# Patient Record
Sex: FEMALE | Race: WHITE | NOT HISPANIC OR LATINO | Employment: FULL TIME | URBAN - METROPOLITAN AREA
[De-identification: names, ages, dates, MRNs, and addresses within clinical notes are randomized per-mention and may not be internally consistent; named-entity substitution may affect disease eponyms.]

---

## 2018-08-06 ENCOUNTER — OFFICE VISIT (OUTPATIENT)
Dept: URGENT CARE | Facility: CLINIC | Age: 53
End: 2018-08-06

## 2018-08-06 VITALS
RESPIRATION RATE: 16 BRPM | DIASTOLIC BLOOD PRESSURE: 78 MMHG | BODY MASS INDEX: 32.93 KG/M2 | OXYGEN SATURATION: 93 % | SYSTOLIC BLOOD PRESSURE: 140 MMHG | HEIGHT: 70 IN | HEART RATE: 91 BPM | WEIGHT: 230 LBS | TEMPERATURE: 98.9 F
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
## 2018-08-06 DIAGNOSIS — I10 ESSENTIAL HYPERTENSION: ICD-10-CM

## 2018-08-06 PROCEDURE — 99203 OFFICE O/P NEW LOW 30 MIN: CPT | Performed by: NURSE PRACTITIONER

## 2018-08-06 RX ORDER — AMOXICILLIN AND CLAVULANATE POTASSIUM 875; 125 MG/1; MG/1
1 TABLET, FILM COATED ORAL 2 TIMES DAILY
Qty: 20 TAB | Refills: 0 | Status: SHIPPED | OUTPATIENT
Start: 2018-08-06 | End: 2018-08-16

## 2018-08-06 RX ORDER — LISINOPRIL 20 MG/1
20 TABLET ORAL DAILY
Qty: 90 TAB | Refills: 1 | Status: SHIPPED | OUTPATIENT
Start: 2018-08-06 | End: 2021-02-24

## 2018-08-06 ASSESSMENT — ENCOUNTER SYMPTOMS
FEVER: 0
DIZZINESS: 0
DIARRHEA: 0
PALPITATIONS: 0
SORE THROAT: 0
MYALGIAS: 0
VOMITING: 0
HEADACHES: 0
SHORTNESS OF BREATH: 0
CHILLS: 0
NAUSEA: 0
COUGH: 0

## 2018-08-07 NOTE — PROGRESS NOTES
Subjective:      Laura Cano is a 52 y.o. female who presents with Dog Bite (right thumb x 24 hours)    Chief Complaint   Patient presents with   • Dog Bite     right thumb x 24 hours     She was breaking up a dog fight when she accidentally got bit  She is currently on her tenderness. She states her to get read this afternoon she did let the area bleed out and cleaned with iodine  She also needs a refill on her lisinopril as she has to find a new primary care doctor        HPI    Review of Systems   Constitutional: Negative for chills, fever and malaise/fatigue.   HENT: Negative for congestion and sore throat.    Respiratory: Negative for cough and shortness of breath.    Cardiovascular: Negative for chest pain and palpitations.   Gastrointestinal: Negative for diarrhea, nausea and vomiting.   Genitourinary: Negative for dysuria.   Musculoskeletal: Positive for joint pain. Negative for myalgias.   Skin: Negative for rash.   Neurological: Negative for dizziness and headaches.     Past Medical History:   Diagnosis Date   • Acne 2012   • Dysuria 2012   • Family history of colon cancer 2012   • HTN (hypertension) 2012   • Microcytic anemia 2012   • Thrombosed external hemorrhoid 10/24/2012   • Vitamin d deficiency 2012     .Family history reviewed and not related to this visit  Social History     Social History   • Marital status:      Spouse name: N/A   • Number of children: 3   • Years of education: N/A     Occupational History   •  [employer]     Social History Main Topics   • Smoking status: Never Smoker   • Smokeless tobacco: Never Used   • Alcohol use Yes      Comment: occ   • Drug use: No   • Sexual activity: Yes     Partners: Male     Other Topics Concern   • Not on file     Social History Narrative    Employed as .    In process of adopting 15 yo boy. Was in process of adopting his older sister but she  of complications of DM1 prior to  "completion.    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          Objective:     /78   Pulse 91   Temp 37.2 °C (98.9 °F)   Resp 16   Ht 1.778 m (5' 10\")   Wt 104.3 kg (230 lb)   SpO2 93%   BMI 33.00 kg/m²      Physical Exam   Constitutional: She is oriented to person, place, and time. She appears well-developed and well-nourished. No distress.   HENT:   Head: Normocephalic and atraumatic.   Eyes: Conjunctivae are normal.   Cardiovascular: Normal rate, regular rhythm and normal heart sounds.    Pulmonary/Chest: Effort normal and breath sounds normal.   Abdominal: Soft.   Musculoskeletal: Normal range of motion. She exhibits edema and tenderness.   Neurological: She is alert and oriented to person, place, and time.   Skin: Skin is warm and dry.   Psychiatric: She has a normal mood and affect. Her behavior is normal. Judgment and thought content normal.   Nursing note and vitals reviewed.              Assessment/Plan:     1. Dog bite, initial encounter  We did discuss if the finger search to get more red despite being on antibiotics to go to the emergency room- amoxicillin-clavulanate (AUGMENTIN) 875-125 MG Tab; Take 1 Tab by mouth 2 times a day for 10 days.  Dispense: 20 Tab; Refill: 0    2. Essential hypertension    - lisinopril (PRINIVIL) 20 MG Tab; Take 1 Tab by mouth every day.  Dispense: 90 Tab; Refill: 1  Please make an appointment for follow up with your primary care provider or make appointment to establish with a primary care. Return for any perception of change in condition, worsening of symptoms, such as perception of worse pain, worsening fever, not getting better, or any other concerns. Please go to the nearest emergency room for any shortness of breath or chest pain or for any of the emergent conditions we have discussed. Patient and/or family states understanding to plan of care, and discharge instructions. Different diagnosis were discussed and signs/symptoms were discussed to educate on.            "

## 2020-03-13 ENCOUNTER — OFFICE VISIT (OUTPATIENT)
Dept: URGENT CARE | Facility: CLINIC | Age: 55
End: 2020-03-13

## 2020-03-13 VITALS
DIASTOLIC BLOOD PRESSURE: 100 MMHG | HEART RATE: 78 BPM | TEMPERATURE: 98.9 F | SYSTOLIC BLOOD PRESSURE: 166 MMHG | HEIGHT: 70 IN | OXYGEN SATURATION: 98 % | BODY MASS INDEX: 30.06 KG/M2 | WEIGHT: 210 LBS | RESPIRATION RATE: 16 BRPM

## 2020-03-13 DIAGNOSIS — L03.114 CELLULITIS OF LEFT UPPER EXTREMITY: ICD-10-CM

## 2020-03-13 DIAGNOSIS — Z23 NEED FOR TDAP VACCINATION: ICD-10-CM

## 2020-03-13 DIAGNOSIS — I10 ESSENTIAL HYPERTENSION: ICD-10-CM
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
## 2020-03-13 PROCEDURE — 99214 OFFICE O/P EST MOD 30 MIN: CPT | Mod: 25 | Performed by: PHYSICIAN ASSISTANT

## 2020-03-13 PROCEDURE — 90715 TDAP VACCINE 7 YRS/> IM: CPT | Performed by: PHYSICIAN ASSISTANT

## 2020-03-13 PROCEDURE — 90471 IMMUNIZATION ADMIN: CPT | Performed by: PHYSICIAN ASSISTANT

## 2020-03-13 RX ORDER — AMOXICILLIN AND CLAVULANATE POTASSIUM 875; 125 MG/1; MG/1
1 TABLET, FILM COATED ORAL 2 TIMES DAILY
Qty: 10 TAB | Refills: 0 | Status: SHIPPED | OUTPATIENT
Start: 2020-03-13 | End: 2020-03-18

## 2020-03-13 RX ORDER — LISINOPRIL 20 MG/1
20 TABLET ORAL DAILY
Qty: 30 TAB | Refills: 0 | Status: SHIPPED | OUTPATIENT
Start: 2020-03-13 | End: 2021-02-24

## 2020-03-13 ASSESSMENT — ENCOUNTER SYMPTOMS
BLURRED VISION: 0
DIZZINESS: 0
HEADACHES: 0
CHILLS: 0
FEVER: 0

## 2020-03-14 NOTE — PROGRESS NOTES
"Subjective:   Laura Cano  is a 54 y.o. female who presents for Other (cat scraches lt arm happened this morning )    This is a new problem.  Patient presents to urgent care with concern for possible infection from cat scratch that occurred earlier today.  Patient was holding her cat when it was scared by the dog and planted its feet into her left wrist in an effort to sclerae away.  Patient sustained multiple abrasions and puncture wounds to the left wrist.  This has progressively been becoming more painful with redness and surrounding swelling over the past few hours.  She has had no documented fever.  Date of last known tetanus was 2007.    Patient also has history of hypertension.  She is not currently seeing a primary care provider.  She was seeing a provider in [place] who was prescribing her medication however they are no longer in practice.  Patient has 30 days of lisinopril remaining and is requesting a refill. Patient admits that her blood pressure is elevated here in clinic today because she has not taken her medication today.  Patient denies any headache, blurred vision, chest pain.      Other   Pertinent negatives include no chest pain, chills, fever or headaches.     Review of Systems   Constitutional: Negative for chills, fever and malaise/fatigue.   Eyes: Negative for blurred vision.   Cardiovascular: Negative for chest pain.   Skin:        Cat scratches with surrounding redness and pain left forearm   Neurological: Negative for dizziness and headaches.   All other systems reviewed and are negative.    Allergies   Allergen Reactions   • Sulfa Drugs      Reviewed past medical, surgical , social and family history.  Reviewed prescription and over-the-counter medications with patient and electronic health record today.     Objective:   BP (!) 166/100   Pulse 78   Temp 37.2 °C (98.9 °F) (Temporal)   Resp 16   Ht 1.778 m (5' 10\")   Wt 95.3 kg (210 lb)   SpO2 98%   BMI 30.13 kg/m²   Physical " Exam  Vitals signs reviewed.   Constitutional:       General: She is not in acute distress.     Appearance: She is well-developed. She is not ill-appearing or toxic-appearing.   HENT:      Head: Normocephalic and atraumatic.      Right Ear: Tympanic membrane, ear canal and external ear normal.      Left Ear: Tympanic membrane, ear canal and external ear normal.      Nose: Nose normal.      Mouth/Throat:      Lips: Pink. No lesions.      Mouth: Mucous membranes are moist.      Pharynx: Oropharynx is clear. Uvula midline. No oropharyngeal exudate.   Eyes:      General: Lids are normal.      Extraocular Movements: Extraocular movements intact.      Conjunctiva/sclera: Conjunctivae normal.      Pupils: Pupils are equal, round, and reactive to light.      Funduscopic exam:     Right eye: No papilledema.         Left eye: No papilledema.   Neck:      Musculoskeletal: Normal range of motion and neck supple.      Vascular: No carotid bruit.   Cardiovascular:      Rate and Rhythm: Normal rate and regular rhythm.      Heart sounds: Normal heart sounds. No murmur. No friction rub. No gallop.    Pulmonary:      Effort: Pulmonary effort is normal. No respiratory distress.      Breath sounds: Normal breath sounds.   Abdominal:      General: Bowel sounds are normal. There is no distension.      Palpations: Abdomen is soft. There is no mass.      Tenderness: There is no abdominal tenderness. There is no guarding or rebound.   Musculoskeletal: Normal range of motion.         General: No tenderness or deformity.        Arms:       Comments: Left wrist with multiple small puncture wounds with surrounding erythema, warmth and moderate soft tissue swelling.  Patient exhibits no pain with flexion extension of the thumb flexion or extension of the wrist.  Distal neurovascular intact.   Lymphadenopathy:      Head:      Right side of head: No submental, submandibular or tonsillar adenopathy.      Left side of head: No submental,  submandibular or tonsillar adenopathy.      Cervical: No cervical adenopathy.      Upper Body:      Right upper body: No supraclavicular adenopathy.      Left upper body: No supraclavicular adenopathy.   Skin:     General: Skin is warm and dry.      Findings: No rash.   Neurological:      Mental Status: She is alert and oriented to person, place, and time.      Cranial Nerves: Cranial nerves are intact. No cranial nerve deficit.      Sensory: Sensation is intact. No sensory deficit.      Motor: Motor function is intact.      Coordination: Coordination is intact. Coordination normal.      Gait: Gait is intact.   Psychiatric:         Attention and Perception: Attention normal.         Mood and Affect: Mood and affect normal.         Speech: Speech normal.         Behavior: Behavior normal. Behavior is cooperative.         Thought Content: Thought content normal.         Judgment: Judgment normal.           Assessment/Plan:   1. Cat scratch  - Tdap =>6yo IM    2. Cellulitis of left upper extremity  - amoxicillin-clavulanate (AUGMENTIN) 875-125 MG Tab; Take 1 Tab by mouth 2 times a day for 5 days.  Dispense: 10 Tab; Refill: 0    3. Essential hypertension  - lisinopril (PRINIVIL) 20 MG Tab; Take 1 Tab by mouth every day.  Dispense: 30 Tab; Refill: 0  - REFERRAL TO FOLLOW-UP WITH PRIMARY CARE    4. Need for Tdap vaccination  - Tdap =>6yo IM    Tetanus is updated today.  Patient will be placed on Augmentin as above.  Recommend close observation.  Discussed with patient signs to observe for in terms of potential tendon infection and is counseled on presenting to the emergency department for concern of tendon infection or severe worsening.  Patient verbalizes understanding and wishes to proceed.    Refill provided on lisinopril for 30-day supply.  Discussed with patient that she will need to establish with primary care.  She is inquiring if she can return to urgent care every 30 days for medication refills.  I discussed with  the patient the importance of establishing with primary care as we do not provide longitudinal long-term care in the urgent care setting.  Referral is placed.    Differential diagnosis, natural history, supportive care, and indications for immediate follow-up discussed.     Red flag warning symptoms and strict ER/follow-up precautions given.  The patient demonstrated a good understanding and agreed with the treatment plan.  Please note that this note was created using voice recognition speech to text software. Every effort has been made to correct obvious errors.  However, I expect there are errors of grammar and possibly context that were not discovered prior to finalizing the note  MYRNA Nelson PA-C

## 2021-01-14 ENCOUNTER — TELEPHONE (OUTPATIENT)
Dept: SCHEDULING | Facility: IMAGING CENTER | Age: 56
End: 2021-01-14

## 2021-02-24 ENCOUNTER — OFFICE VISIT (OUTPATIENT)
Dept: MEDICAL GROUP | Facility: MEDICAL CENTER | Age: 56
End: 2021-02-24
Payer: COMMERCIAL

## 2021-02-24 VITALS
HEIGHT: 70 IN | TEMPERATURE: 98.2 F | WEIGHT: 228 LBS | SYSTOLIC BLOOD PRESSURE: 164 MMHG | BODY MASS INDEX: 32.64 KG/M2 | RESPIRATION RATE: 12 BRPM | HEART RATE: 84 BPM | OXYGEN SATURATION: 94 % | DIASTOLIC BLOOD PRESSURE: 90 MMHG

## 2021-02-24 DIAGNOSIS — Z12.12 SCREENING FOR COLORECTAL CANCER: ICD-10-CM

## 2021-02-24 DIAGNOSIS — Z80.0 FAMILY HISTORY OF COLON CANCER: ICD-10-CM

## 2021-02-24 DIAGNOSIS — Z12.11 SCREENING FOR COLORECTAL CANCER: ICD-10-CM

## 2021-02-24 DIAGNOSIS — Z82.62 FAMILY HISTORY OF OSTEOPOROSIS: ICD-10-CM

## 2021-02-24 DIAGNOSIS — I10 ESSENTIAL HYPERTENSION: ICD-10-CM

## 2021-02-24 DIAGNOSIS — D50.9 MICROCYTIC ANEMIA: ICD-10-CM

## 2021-02-24 DIAGNOSIS — E66.9 OBESITY (BMI 30-39.9): ICD-10-CM

## 2021-02-24 DIAGNOSIS — Z83.3 FAMILY HISTORY OF DIABETES MELLITUS (DM): ICD-10-CM

## 2021-02-24 DIAGNOSIS — Z12.31 ENCOUNTER FOR SCREENING MAMMOGRAM FOR BREAST CANCER: ICD-10-CM

## 2021-02-24 PROCEDURE — 99204 OFFICE O/P NEW MOD 45 MIN: CPT | Performed by: FAMILY MEDICINE

## 2021-02-24 RX ORDER — LISINOPRIL 10 MG/1
10 TABLET ORAL DAILY
Qty: 90 TABLET | Refills: 0 | Status: SHIPPED | OUTPATIENT
Start: 2021-02-24 | End: 2021-03-02 | Stop reason: SDUPTHER

## 2021-02-24 ASSESSMENT — PATIENT HEALTH QUESTIONNAIRE - PHQ9: CLINICAL INTERPRETATION OF PHQ2 SCORE: 0

## 2021-02-24 NOTE — ASSESSMENT & PLAN NOTE
Chronic issue.  She was previously seeing Dr. Lan who started her on lisinopril but she has not taken this in more than 9 months.,  No chest pain, palpations, SOB, hands or feet swelling, headaches or dizziness.  She is not checking her home blood pressure.  No negative SEs from lisinopril in the past.

## 2021-02-24 NOTE — ASSESSMENT & PLAN NOTE
Pt has hx of anemia which she states caused her TIA symptoms and this improved with an iron infusion.  No syncope.  States she feels tired but is taking care of her 7 yo grandson and her LMP was 10/2020 and she is now getting hot flashes which wake her up at night.

## 2021-02-24 NOTE — PROGRESS NOTES
Subjective:     CC:  Diagnoses of Essential hypertension, Microcytic anemia, Family history of colon cancer, Encounter for screening mammogram for breast cancer, Screening for colorectal cancer, Family history of osteoporosis, Family history of diabetes mellitus (DM), and Obesity (BMI 30-39.9) were pertinent to this visit.    HISTORY OF THE PRESENT ILLNESS: Patient is a 55 y.o. female. This pleasant patient is here today to establish care and discuss DEXA referral, labs and BP follow-up. Her prior PCP was Dr. Shanell Lan.    HTN (hypertension)  Chronic issue.  She was previously seeing Dr. Lan who started her on lisinopril but she has not taken this in more than 9 months.,  No chest pain, palpations, SOB, hands or feet swelling, headaches or dizziness.  She is not checking her home blood pressure.  No negative SEs from lisinopril in the past.    Microcytic anemia  Pt has hx of anemia which she states caused her TIA symptoms and this improved with an iron infusion.  No syncope.  States she feels tired but is taking care of her 7 yo grandson and her LMP was 10/2020 and she is now getting hot flashes which wake her up at night.    Family history of colon cancer  Father was dx with colon cancer.  Pt is due for repeat colonoscopy. Denies changes in stools and denies rectal bleeding.      Allergies: Sulfa drugs    Current Outpatient Medications Ordered in Epic   Medication Sig Dispense Refill   • lisinopril (PRINIVIL) 10 MG Tab Take 1 tablet by mouth every day. 90 tablet 0   • aspirin  MG TBEC Take 1 Tab by mouth every day. 30 Tab 0     No current Epic-ordered facility-administered medications on file.       Past Medical History:   Diagnosis Date   • Acne 4/16/2012   • Dysuria 4/16/2012   • Family history of colon cancer 4/16/2012   • HTN (hypertension) 4/16/2012   • Microcytic anemia 8/22/2012   • Thrombosed external hemorrhoid 10/24/2012   • Vitamin d deficiency 4/16/2012       Past Surgical History:   Procedure  "Laterality Date   • TONSILLECTOMY     • TUBAL LIGATION         Social History     Tobacco Use   • Smoking status: Never Smoker   • Smokeless tobacco: Never Used   Substance Use Topics   • Alcohol use: Yes     Alcohol/week: 1.2 oz     Types: 2 Glasses of wine per week     Comment: occ   • Drug use: No       Social History     Social History Narrative    Employed as .    In process of adopting 15 yo boy. Was in process of adopting his older sister but she  of complications of DM1 prior to completion.    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       Family History   Problem Relation Age of Onset   • Lung Disease Father         emphysema   • Heart Disease Father         d. MI age 67   • Hypertension Father    • Cancer Father         colon polyps   • Lung Disease Mother         emphysema   • Osteoporosis Mother    • Cancer Mother         benign brain tumor   • Diabetes Brother        Health Maintenance: mammogram and colonoscopy ordered.  Pt declines flu shot.  Encouraged shingles vaccine which she plans to research and get back to me regarding timing she is willing to get it    ROS:   Gen: no fevers/chills, no changes in weight  Eyes: no changes in vision  ENT: no sore throat, no hearing loss, no bloody nose  Pulm: no sob, no cough  CV: no chest pain, no palpitations  GI: no nausea/vomiting, no diarrhea  : no dysuria  MSk: no myalgias  Skin: no rash  Neuro: no headaches, no numbness/tingling  Heme/Lymph: no easy bruising      Objective:     Exam: BP (!) 164/90 (BP Location: Left arm, Patient Position: Sitting, BP Cuff Size: Adult)   Pulse 84   Temp 36.8 °C (98.2 °F) (Temporal)   Resp 12   Ht 1.778 m (5' 10\")   Wt 103 kg (228 lb)   SpO2 94%  Body mass index is 32.71 kg/m².    General: Normal appearing. No distress.  HEENT: Normocephalic.  Canals are clear bilaterally, tympanic membranes are benign, nasal and OP examinations deferred given COVID19 exposure risk  Eyes: Eyes conjunctiva clear lids without " ptosis, pupils equal and reactive to light accommodation, ears normal shape and contour  Neck: Supple. Thyroid is not enlarged.  Pulmonary: Clear to ausculation.  Normal effort. No rales, ronchi, or wheezing.  Cardiovascular: Regular rate and rhythm without murmur.  Abdomen: Soft, nontender, nondistended. Normal bowel sounds. Liver and spleen are not palpable  Neurologic: No gross motor deficits  Lymph: No cervical or supraclavicular lymph nodes are palpable  Skin: Warm and dry.  No obvious lesions.  Musculoskeletal: Normal gait. No extremity cyanosis, clubbing, or edema.  Psych: Normal mood and affect. Alert and oriented x3. Judgment and insight is normal. PHQ 9 =3.  No SI/Hi    Assessment & Plan:   55 y.o. female with the following -    1. Essential hypertension  Chronic, uncontrolled, asymptomatic.  Restart lisinopril with 5mg daily for 3 days followed by 10mg daily.  Encouraged low salt diet, avoid caffeine.  Er precautions given.  Will closely follow-up  - Comp Metabolic Panel; Future  - Lipid Profile; Future  - lisinopril (PRINIVIL) 10 MG Tab; Take 1 tablet by mouth every day.  Dispense: 90 tablet; Refill: 0    2. Microcytic anemia  Chronic issue, unclear control now s/p iron infusion. Will check labs and further evaluate based on results.  - CBC WITH DIFFERENTIAL; Future    3. Family history of colon cancer  Pt is asymptomatic but has family hx of colon cancer and is due for repeat colonoscopy.  Referral placed    4. Encounter for screening mammogram for breast cancer  Pt reports no breast lumps, masses, skin changes, nipple discharge.  Screening mammogram ordered  - MA-SCREENING MAMMO BILAT W/CAD; Future    5. Screening for colorectal cancer  Pt is asymptomatic but has family hx of colon cancer and is due for repeat colonoscopy.  Referral placed  - REFERRAL TO GI FOR COLONOSCOPY    6. Family history of osteoporosis  Pt's mother has osteoporosis and the pt drinks alcohol regularly and would like screening.   Imaging ordered.  Lifestyle and dietary recommendations given.  - DS-BONE DENSITY STUDY (DEXA); Future    7. Family history of diabetes mellitus (DM)  Pt is overweight and brother has DM.  Will do screening labs. Dietary and exercise guidance given.  - HEMOGLOBIN A1C; Future    8. Obesity (BMI 30-39.9)  - Patient identified as having weight management issue.  Appropriate orders and counseling given.      Return in about 1 week (around 3/3/2021) for HTN follow-up.    Please note that this dictation was created using voice recognition software. I have made every reasonable attempt to correct obvious errors, but I expect that there are errors of grammar and possibly content that I did not discover before finalizing the note.

## 2021-02-24 NOTE — ASSESSMENT & PLAN NOTE
Father was dx with colon cancer.  Pt is due for repeat colonoscopy. Denies changes in stools and denies rectal bleeding.

## 2021-03-01 NOTE — PROGRESS NOTES
Subjective:     CC: HTN follow-up    HPI:   Laura presents today with     HTN (hypertension)  Chronic, issue.  No chest pain, palpitations or SOB.  She has taken the full dose of lisinopril 10mg for the last 4 days.  She has noticed a twice of her right cheek since starting her lisinopril but no other negative Ses.    Other headache syndrome  Onset a few months ago, temporarily improved and worsened again yesterday.  Feels like she has a water sensation behind right eye and deep in her head which worsens when she does a sudden turn with her head.  No vision or hearing changes.  The discomfort tends to last from 1 day to 3 days and self resolves.  No known triggers.  She has tried claritin without relief.  She recently had a full set of dental xrays which were normal.  No fevers, chills, weakness or numbness.      Past Medical History:   Diagnosis Date   • Acne 4/16/2012   • Dysuria 4/16/2012   • Family history of colon cancer 4/16/2012   • HTN (hypertension) 4/16/2012   • Microcytic anemia 8/22/2012   • Thrombosed external hemorrhoid 10/24/2012   • Vitamin d deficiency 4/16/2012       Social History     Tobacco Use   • Smoking status: Never Smoker   • Smokeless tobacco: Never Used   Substance Use Topics   • Alcohol use: Yes     Alcohol/week: 1.2 oz     Types: 2 Glasses of wine per week     Comment: occ   • Drug use: No       Current Outpatient Medications Ordered in Epic   Medication Sig Dispense Refill   • fluticasone (FLONASE) 50 MCG/ACT nasal spray Administer 1 Spray into affected nostril(S) every day. 16 g 0   • lisinopril (PRINIVIL) 20 MG Tab Take 1 tablet by mouth every day. To replace lisinopril 10mg daily. 90 tablet 0   • Dextromethorphan-guaiFENesin (CORICIDIN HBP CONGESTION/COUGH)  MG Cap Take 1 tablet by mouth every day. 30 capsule 0   • aspirin  MG TBEC Take 1 Tab by mouth every day. 30 Tab 0     No current Epic-ordered facility-administered medications on file.       Allergies:  Sulfa  "drugs    Health Maintenance: pt to get labs as previously ordered on 3/11/2021    ROS:  Gen: no fevers/chills, no changes in weight  Eyes: no changes in vision  ENT: no sore throat, no hearing loss, no bloody nose  Pulm: no sob, no cough  CV: no chest pain, no palpitations  GI: no nausea/vomiting, no diarrhea  : no dysuria  MSk: no myalgias  Skin: no rash  Neuro: no headaches, no numbness/tingling  Heme/Lymph: no easy bruising      Objective:       Exam:  /80 (BP Location: Right arm, Patient Position: Sitting, BP Cuff Size: Adult)   Pulse 83   Temp 36.8 °C (98.2 °F) (Temporal)   Resp 12   Ht 1.778 m (5' 10\")   Wt 103 kg (228 lb)   SpO2 94%   BMI 32.71 kg/m²  Body mass index is 32.71 kg/m².    Gen: Alert and oriented, No apparent distress.  HEENT: Right TM with fluid noted behind TM but no erythema.  Left TM is normal.  Neck: Neck is supple without lymphadenopathy.  Lungs: Normal effort, CTA bilaterally, no wheezes, rhonchi, or rales  CV: Regular rate and rhythm. No murmurs, rubs, or gallops.  Ext: No clubbing, cyanosis, edema.  Neuro: strength 5/5 in all muscle groups, sensation intact bilaterally to light touch, coordination intact bilaterally, Romberg negative, pronator drift negative        Assessment & Plan:     55 y.o. female with the following -     1. Essential hypertension  Chronic, improved but not at goal of less than 140/90.  Will get non-fasting BMP today given her facial twitching may be due to electrolyte abnormality vs stress and increase lisinopril 10mg to 20mg daily.  Will montior.  - lisinopril (PRINIVIL) 20 MG Tab; Take 1 tablet by mouth every day. To replace lisinopril 10mg daily.  Dispense: 90 tablet; Refill: 0  - Basic Metabolic Panel; Future    2. Fluid level behind tympanic membrane of right ear  New issue which is the likely cause of her headache discomfort.  No red flag headache signs.  Start coricidin and flonase.  SE profile discussed.  Follow-up precautions given and will " monitor. Patient expressed understanding and agreement with plan.  - fluticasone (FLONASE) 50 MCG/ACT nasal spray; Administer 1 Spray into affected nostril(S) every day.  Dispense: 16 g; Refill: 0  - Dextromethorphan-guaiFENesin (CORICIDIN HBP CONGESTION/COUGH)  MG Cap; Take 1 tablet by mouth every day.  Dispense: 30 capsule; Refill: 0      Return in about 4 weeks (around 3/30/2021) for HTN and ear recheck.    Please note that this dictation was created using voice recognition software. I have made every reasonable attempt to correct obvious errors, but I expect that there are errors of grammar and possibly content that I did not discover before finalizing the note.

## 2021-03-02 ENCOUNTER — OFFICE VISIT (OUTPATIENT)
Dept: MEDICAL GROUP | Facility: MEDICAL CENTER | Age: 56
End: 2021-03-02
Payer: COMMERCIAL

## 2021-03-02 ENCOUNTER — HOSPITAL ENCOUNTER (OUTPATIENT)
Dept: LAB | Facility: MEDICAL CENTER | Age: 56
End: 2021-03-02
Attending: FAMILY MEDICINE
Payer: COMMERCIAL

## 2021-03-02 VITALS
WEIGHT: 228 LBS | OXYGEN SATURATION: 94 % | HEART RATE: 83 BPM | HEIGHT: 70 IN | SYSTOLIC BLOOD PRESSURE: 142 MMHG | DIASTOLIC BLOOD PRESSURE: 80 MMHG | RESPIRATION RATE: 12 BRPM | TEMPERATURE: 98.2 F | BODY MASS INDEX: 32.64 KG/M2

## 2021-03-02 DIAGNOSIS — H65.91 FLUID LEVEL BEHIND TYMPANIC MEMBRANE OF RIGHT EAR: ICD-10-CM

## 2021-03-02 DIAGNOSIS — I10 ESSENTIAL HYPERTENSION: ICD-10-CM

## 2021-03-02 DIAGNOSIS — G44.89 OTHER HEADACHE SYNDROME: ICD-10-CM

## 2021-03-02 LAB
ANION GAP SERPL CALC-SCNC: 11 MMOL/L (ref 7–16)
BUN SERPL-MCNC: 22 MG/DL (ref 8–22)
CALCIUM SERPL-MCNC: 9.5 MG/DL (ref 8.4–10.2)
CHLORIDE SERPL-SCNC: 102 MMOL/L (ref 96–112)
CO2 SERPL-SCNC: 26 MMOL/L (ref 20–33)
CREAT SERPL-MCNC: 0.67 MG/DL (ref 0.5–1.4)
GLUCOSE SERPL-MCNC: 92 MG/DL (ref 65–99)
POTASSIUM SERPL-SCNC: 4.1 MMOL/L (ref 3.6–5.5)
SODIUM SERPL-SCNC: 139 MMOL/L (ref 135–145)

## 2021-03-02 PROCEDURE — 36415 COLL VENOUS BLD VENIPUNCTURE: CPT

## 2021-03-02 PROCEDURE — 99213 OFFICE O/P EST LOW 20 MIN: CPT | Performed by: FAMILY MEDICINE

## 2021-03-02 PROCEDURE — 80048 BASIC METABOLIC PNL TOTAL CA: CPT

## 2021-03-02 RX ORDER — FLUTICASONE PROPIONATE 50 MCG
1 SPRAY, SUSPENSION (ML) NASAL DAILY
Qty: 16 G | Refills: 0 | Status: SHIPPED | OUTPATIENT
Start: 2021-03-02 | End: 2021-05-12

## 2021-03-02 RX ORDER — LORATADINE 10 MG
1 CAPSULE ORAL DAILY
Qty: 30 CAPSULE | Refills: 0 | Status: SHIPPED | OUTPATIENT
Start: 2021-03-02 | End: 2021-05-12

## 2021-03-02 RX ORDER — LISINOPRIL 20 MG/1
20 TABLET ORAL DAILY
Qty: 90 TABLET | Refills: 0 | Status: SHIPPED | OUTPATIENT
Start: 2021-03-02 | End: 2021-04-24 | Stop reason: SDUPTHER

## 2021-03-02 NOTE — ASSESSMENT & PLAN NOTE
Onset a few months ago, temporarily improved and worsened again yesterday.  Feels like she has a water sensation behind right eye and deep in her head which worsens when she does a sudden turn with her head.  No vision or hearing changes.  The discomfort tends to last from 1 day to 3 days and self resolves.  No known triggers.  She has tried claritin without relief.  She recently had a full set of dental xrays which were normal.  No fevers, chills, weakness or numbness.

## 2021-03-02 NOTE — ASSESSMENT & PLAN NOTE
Chronic, issue.  No chest pain, palpitations or SOB.  She has taken the full dose of lisinopril 10mg for the last 4 days.  She has noticed a twice of her right cheek since starting her lisinopril but no other negative Ses.

## 2021-04-15 ENCOUNTER — HOSPITAL ENCOUNTER (OUTPATIENT)
Dept: RADIOLOGY | Facility: MEDICAL CENTER | Age: 56
End: 2021-04-15
Payer: COMMERCIAL

## 2021-04-15 DIAGNOSIS — I10 ESSENTIAL HYPERTENSION: ICD-10-CM

## 2021-04-15 RX ORDER — LISINOPRIL 10 MG/1
TABLET ORAL
Qty: 90 TABLET | Refills: 0 | OUTPATIENT
Start: 2021-04-15

## 2021-04-15 NOTE — TELEPHONE ENCOUNTER
Received request via: Pharmacy    Was the patient seen in the last year in this department? Yes    Does the patient have an active prescription (recently filled or refills available) for medication(s) requested? No     Future Appointments       Provider Department Center    4/21/2021 11:30 AM Daly Haji D.O. [facility]    4/22/2021 3:30 PM S BARBARA MG 1 [facility]    4/22/2021 3:45 PM S [facility] BD [facility]

## 2021-04-22 ENCOUNTER — HOSPITAL ENCOUNTER (OUTPATIENT)
Dept: RADIOLOGY | Facility: MEDICAL CENTER | Age: 56
End: 2021-04-22
Attending: FAMILY MEDICINE
Payer: COMMERCIAL

## 2021-04-22 DIAGNOSIS — Z82.62 FAMILY HISTORY OF OSTEOPOROSIS: ICD-10-CM

## 2021-04-22 DIAGNOSIS — Z12.31 ENCOUNTER FOR SCREENING MAMMOGRAM FOR BREAST CANCER: ICD-10-CM

## 2021-04-22 PROCEDURE — 77080 DXA BONE DENSITY AXIAL: CPT

## 2021-04-22 PROCEDURE — 77063 BREAST TOMOSYNTHESIS BI: CPT

## 2021-04-23 ENCOUNTER — TELEPHONE (OUTPATIENT)
Dept: MEDICAL GROUP | Facility: MEDICAL CENTER | Age: 56
End: 2021-04-23

## 2021-04-23 NOTE — TELEPHONE ENCOUNTER
, Pt called with concerns regarding her recent mammogram done today. She was told by the facility that she would need to return on Monday for another appt. Pt is worried and would like some reassurance regarding the results from today.Please advise.Send mychart if possible due to being Friday afternoon.

## 2021-04-24 DIAGNOSIS — I10 ESSENTIAL HYPERTENSION: ICD-10-CM

## 2021-04-26 ENCOUNTER — HOSPITAL ENCOUNTER (OUTPATIENT)
Dept: RADIOLOGY | Facility: MEDICAL CENTER | Age: 56
End: 2021-04-26
Attending: FAMILY MEDICINE
Payer: COMMERCIAL

## 2021-04-26 DIAGNOSIS — R92.8 ABNORMAL MAMMOGRAM: ICD-10-CM

## 2021-04-26 DIAGNOSIS — R92.8 ABNORMAL MAMMOGRAM OF RIGHT BREAST: ICD-10-CM

## 2021-04-26 PROCEDURE — G0279 TOMOSYNTHESIS, MAMMO: HCPCS | Mod: RT

## 2021-04-26 PROCEDURE — 76642 ULTRASOUND BREAST LIMITED: CPT | Mod: RT

## 2021-04-26 RX ORDER — LISINOPRIL 20 MG/1
20 TABLET ORAL DAILY
Qty: 90 TABLET | Refills: 0 | Status: SHIPPED | OUTPATIENT
Start: 2021-04-26 | End: 2021-06-07 | Stop reason: SDUPTHER

## 2021-05-03 ENCOUNTER — HOSPITAL ENCOUNTER (OUTPATIENT)
Dept: RADIOLOGY | Facility: MEDICAL CENTER | Age: 56
End: 2021-05-03
Attending: FAMILY MEDICINE
Payer: COMMERCIAL

## 2021-05-03 DIAGNOSIS — R92.8 ABNORMAL MAMMOGRAM OF RIGHT BREAST: ICD-10-CM

## 2021-05-03 LAB — PATHOLOGY CONSULT NOTE: NORMAL

## 2021-05-03 PROCEDURE — 88341 IMHCHEM/IMCYTCHM EA ADD ANTB: CPT | Mod: 91

## 2021-05-03 PROCEDURE — 88342 IMHCHEM/IMCYTCHM 1ST ANTB: CPT

## 2021-05-03 PROCEDURE — 88305 TISSUE EXAM BY PATHOLOGIST: CPT

## 2021-05-03 PROCEDURE — 77065 DX MAMMO INCL CAD UNI: CPT | Mod: RT

## 2021-05-03 PROCEDURE — 88360 TUMOR IMMUNOHISTOCHEM/MANUAL: CPT | Mod: 91

## 2021-05-05 ENCOUNTER — TELEPHONE (OUTPATIENT)
Dept: RADIOLOGY | Facility: MEDICAL CENTER | Age: 56
End: 2021-05-05

## 2021-05-05 ENCOUNTER — TELEMEDICINE (OUTPATIENT)
Dept: MEDICAL GROUP | Facility: MEDICAL CENTER | Age: 56
End: 2021-05-05
Payer: COMMERCIAL

## 2021-05-05 VITALS — WEIGHT: 228 LBS | BODY MASS INDEX: 32.64 KG/M2 | HEIGHT: 70 IN

## 2021-05-05 DIAGNOSIS — M25.521 RIGHT ELBOW PAIN: ICD-10-CM

## 2021-05-05 DIAGNOSIS — C50.911 INFILTRATING DUCTAL CARCINOMA OF RIGHT BREAST (HCC): ICD-10-CM

## 2021-05-05 PROBLEM — C50.919 INVASIVE DUCTAL CARCINOMA OF BREAST (HCC): Status: ACTIVE | Noted: 2021-05-05

## 2021-05-05 PROCEDURE — 99442 PR PHYSICIAN TELEPHONE EVALUATION 11-20 MIN: CPT | Mod: CR | Performed by: FAMILY MEDICINE

## 2021-05-05 NOTE — ASSESSMENT & PLAN NOTE
Pt has screening mammogram on 4/22/21 that was BIRAD0.  She had diagnostic mammogram and US on 4/26/21 that was Category 4 and biopsy was obtained on 5/3/21.  Biopsy pathology was consistent with infiltrating ductal carcinoma of right breast.

## 2021-05-05 NOTE — PROGRESS NOTES
Telephone Appointment Visit   As a means of avoiding spread of COVID-19, this visit is being conducted by telephone. This telephone visit was initiated by the patient and they verbally consented.    Time at start of call: 12:57pm    Reason for Call:  Lab Follow-up    HPI:      Infiltrating ductal carcinoma of right breast (HCC)  Pt has screening mammogram on 4/22/21 that was BIRAD0.  She had diagnostic mammogram and US on 4/26/21 that was Category 4 and biopsy was obtained on 5/3/21.  Biopsy pathology was consistent with infiltrating ductal carcinoma of right breast.    Right inner elbow pain x 1 month.  She has a great stefano puppy and thinks it could be due to pulled muscle.  Has decreased strength in that hand a knot in her wrist.  She has not used ice packs, warm packs or pain medications.  States it just feels achy.  No difficulty moving her elbow.    Labs / Images Reviewed:   5/3/2021 labs reviewed with patient    Assessment and Plan:     1. Infiltrating ductal carcinoma of right breast (HCC)  New issue.  Pt was notified by radiology regarding results.  Recommended urgent breast surgery evaluation and referral to Dr. Yfn azevedo.  I contacted Dr. Coulter who plans to schedule patient for appointment within the week.  Recommended oncology evaluation and referral to Dr. Jaylene azevedo.  I contacted Dr. Mariee who will schedule the patient to be seen asap.  Follow-up precautions given.  Will monitor.  - REFERRAL TO OTHER  - REFERRAL TO HEMATOLOGY ONCOLOGY    2. Right elbow pain  New issue, likely due to strain.  Pt is concerned re: metastatic disease.  Will check imaging.  Warm vs cold packs discussed.  Will provide further treatment recommendations based on imaging results. Patient expressed understanding and agreement with plan.  - DX-ELBOW-COMPLETE 3+ RIGHT; Future    Follow-up: 1 month    Time at end of call: 1:12  Total Time Spent: 11-20 minutes    Daly Haji D.O.

## 2021-05-07 ENCOUNTER — PATIENT OUTREACH (OUTPATIENT)
Dept: OTHER | Facility: MEDICAL CENTER | Age: 56
End: 2021-05-07

## 2021-05-07 ENCOUNTER — HOSPITAL ENCOUNTER (OUTPATIENT)
Dept: RADIOLOGY | Facility: MEDICAL CENTER | Age: 56
End: 2021-05-07
Attending: FAMILY MEDICINE
Payer: COMMERCIAL

## 2021-05-07 DIAGNOSIS — M25.521 RIGHT ELBOW PAIN: ICD-10-CM

## 2021-05-07 PROCEDURE — 73080 X-RAY EXAM OF ELBOW: CPT | Mod: RT

## 2021-05-07 NOTE — PROGRESS NOTES
Oncology nurse navigator reached out to patient to follow up on referral received from  The breast center.  Oncology nurse navigator left contact information with a call back request.

## 2021-05-12 ENCOUNTER — PRE-ADMISSION TESTING (OUTPATIENT)
Dept: ADMISSIONS | Facility: MEDICAL CENTER | Age: 56
End: 2021-05-12
Attending: SURGERY
Payer: COMMERCIAL

## 2021-05-12 ENCOUNTER — HOSPITAL ENCOUNTER (OUTPATIENT)
Dept: RADIOLOGY | Facility: MEDICAL CENTER | Age: 56
End: 2021-05-12
Attending: SURGERY
Payer: COMMERCIAL

## 2021-05-12 DIAGNOSIS — Z01.812 PRE-OPERATIVE LABORATORY EXAMINATION: ICD-10-CM

## 2021-05-12 DIAGNOSIS — Z01.810 PRE-OPERATIVE CARDIOVASCULAR EXAMINATION: ICD-10-CM

## 2021-05-12 DIAGNOSIS — C50.411 MALIGNANT NEOPLASM OF UPPER-OUTER QUADRANT OF RIGHT FEMALE BREAST, UNSPECIFIED ESTROGEN RECEPTOR STATUS (HCC): ICD-10-CM

## 2021-05-12 LAB
ANION GAP SERPL CALC-SCNC: 11 MMOL/L (ref 7–16)
BUN SERPL-MCNC: 22 MG/DL (ref 8–22)
CALCIUM SERPL-MCNC: 9.5 MG/DL (ref 8.5–10.5)
CHLORIDE SERPL-SCNC: 104 MMOL/L (ref 96–112)
CO2 SERPL-SCNC: 25 MMOL/L (ref 20–33)
CREAT SERPL-MCNC: 0.63 MG/DL (ref 0.5–1.4)
EKG IMPRESSION: NORMAL
ERYTHROCYTE [DISTWIDTH] IN BLOOD BY AUTOMATED COUNT: 42.9 FL (ref 35.9–50)
GLUCOSE SERPL-MCNC: 100 MG/DL (ref 65–99)
HCT VFR BLD AUTO: 45.7 % (ref 37–47)
HGB BLD-MCNC: 14.9 G/DL (ref 12–16)
MCH RBC QN AUTO: 30 PG (ref 27–33)
MCHC RBC AUTO-ENTMCNC: 32.6 G/DL (ref 33.6–35)
MCV RBC AUTO: 92.1 FL (ref 81.4–97.8)
PLATELET # BLD AUTO: 254 K/UL (ref 164–446)
PMV BLD AUTO: 10 FL (ref 9–12.9)
POTASSIUM SERPL-SCNC: 4.2 MMOL/L (ref 3.6–5.5)
RBC # BLD AUTO: 4.96 M/UL (ref 4.2–5.4)
SODIUM SERPL-SCNC: 140 MMOL/L (ref 135–145)
WBC # BLD AUTO: 8 K/UL (ref 4.8–10.8)

## 2021-05-12 PROCEDURE — 19281 PERQ DEVICE BREAST 1ST IMAG: CPT | Mod: RT

## 2021-05-12 PROCEDURE — 93010 ELECTROCARDIOGRAM REPORT: CPT | Performed by: INTERNAL MEDICINE

## 2021-05-12 PROCEDURE — 80048 BASIC METABOLIC PNL TOTAL CA: CPT

## 2021-05-12 PROCEDURE — 93005 ELECTROCARDIOGRAM TRACING: CPT

## 2021-05-12 PROCEDURE — 85027 COMPLETE CBC AUTOMATED: CPT

## 2021-05-12 PROCEDURE — 36415 COLL VENOUS BLD VENIPUNCTURE: CPT

## 2021-05-12 NOTE — OR NURSING
Patient had an appointment at 1500 on 5/12/21 to have a Serina placed.  The PAT had been completed, however, the patient still needed blood work and an EKG to be done per Anesthesia Protocol.  Patient asked if she could return immediately after her appointment to have the labs done and I agreed.  Patient has not returned.  Left a voicemail to reschedule to have labs done.

## 2021-05-15 ENCOUNTER — PRE-ADMISSION TESTING (OUTPATIENT)
Dept: ADMISSIONS | Facility: MEDICAL CENTER | Age: 56
End: 2021-05-15
Attending: SURGERY
Payer: COMMERCIAL

## 2021-05-15 DIAGNOSIS — Z01.812 PRE-OPERATIVE LABORATORY EXAMINATION: ICD-10-CM

## 2021-05-15 LAB
COVID ORDER STATUS COVID19: NORMAL
SARS-COV-2 RNA RESP QL NAA+PROBE: NOTDETECTED
SPECIMEN SOURCE: NORMAL

## 2021-05-15 PROCEDURE — U0003 INFECTIOUS AGENT DETECTION BY NUCLEIC ACID (DNA OR RNA); SEVERE ACUTE RESPIRATORY SYNDROME CORONAVIRUS 2 (SARS-COV-2) (CORONAVIRUS DISEASE [COVID-19]), AMPLIFIED PROBE TECHNIQUE, MAKING USE OF HIGH THROUGHPUT TECHNOLOGIES AS DESCRIBED BY CMS-2020-01-R: HCPCS

## 2021-05-15 PROCEDURE — U0005 INFEC AGEN DETEC AMPLI PROBE: HCPCS

## 2021-05-15 PROCEDURE — C9803 HOPD COVID-19 SPEC COLLECT: HCPCS

## 2021-05-19 ENCOUNTER — ANESTHESIA EVENT (OUTPATIENT)
Dept: SURGERY | Facility: MEDICAL CENTER | Age: 56
End: 2021-05-19
Payer: COMMERCIAL

## 2021-05-19 ENCOUNTER — PHARMACY VISIT (OUTPATIENT)
Dept: PHARMACY | Facility: MEDICAL CENTER | Age: 56
End: 2021-05-19
Payer: COMMERCIAL

## 2021-05-19 ENCOUNTER — PATIENT OUTREACH (OUTPATIENT)
Dept: OTHER | Facility: MEDICAL CENTER | Age: 56
End: 2021-05-19

## 2021-05-19 ENCOUNTER — HOSPITAL ENCOUNTER (OUTPATIENT)
Facility: MEDICAL CENTER | Age: 56
End: 2021-05-19
Attending: SURGERY | Admitting: SURGERY
Payer: COMMERCIAL

## 2021-05-19 ENCOUNTER — APPOINTMENT (OUTPATIENT)
Dept: RADIOLOGY | Facility: MEDICAL CENTER | Age: 56
End: 2021-05-19
Attending: SURGERY
Payer: COMMERCIAL

## 2021-05-19 ENCOUNTER — ANESTHESIA (OUTPATIENT)
Dept: SURGERY | Facility: MEDICAL CENTER | Age: 56
End: 2021-05-19
Payer: COMMERCIAL

## 2021-05-19 ENCOUNTER — TELEPHONE (OUTPATIENT)
Dept: MEDICAL GROUP | Facility: MEDICAL CENTER | Age: 56
End: 2021-05-19

## 2021-05-19 VITALS
SYSTOLIC BLOOD PRESSURE: 134 MMHG | RESPIRATION RATE: 18 BRPM | HEART RATE: 77 BPM | WEIGHT: 229.28 LBS | TEMPERATURE: 98 F | BODY MASS INDEX: 32.82 KG/M2 | HEIGHT: 70 IN | DIASTOLIC BLOOD PRESSURE: 78 MMHG | OXYGEN SATURATION: 93 %

## 2021-05-19 DIAGNOSIS — C50.911 INFILTRATING DUCTAL CARCINOMA OF RIGHT BREAST (HCC): ICD-10-CM

## 2021-05-19 DIAGNOSIS — G89.18 POSTOPERATIVE PAIN: ICD-10-CM

## 2021-05-19 DIAGNOSIS — C50.411 MALIGNANT NEOPLASM OF UPPER-OUTER QUADRANT OF RIGHT FEMALE BREAST, UNSPECIFIED ESTROGEN RECEPTOR STATUS (HCC): ICD-10-CM

## 2021-05-19 LAB — PATHOLOGY CONSULT NOTE: NORMAL

## 2021-05-19 PROCEDURE — A9270 NON-COVERED ITEM OR SERVICE: HCPCS | Performed by: SURGERY

## 2021-05-19 PROCEDURE — 700102 HCHG RX REV CODE 250 W/ 637 OVERRIDE(OP): Performed by: ANESTHESIOLOGY

## 2021-05-19 PROCEDURE — 502240 HCHG MISC OR SUPPLY RC 0272: Performed by: SURGERY

## 2021-05-19 PROCEDURE — 160048 HCHG OR STATISTICAL LEVEL 1-5: Performed by: SURGERY

## 2021-05-19 PROCEDURE — 700101 HCHG RX REV CODE 250: Performed by: SURGERY

## 2021-05-19 PROCEDURE — 88307 TISSUE EXAM BY PATHOLOGIST: CPT

## 2021-05-19 PROCEDURE — 160025 RECOVERY II MINUTES (STATS): Performed by: SURGERY

## 2021-05-19 PROCEDURE — 160002 HCHG RECOVERY MINUTES (STAT): Performed by: SURGERY

## 2021-05-19 PROCEDURE — 160046 HCHG PACU - 1ST 60 MINS PHASE II: Performed by: SURGERY

## 2021-05-19 PROCEDURE — 501838 HCHG SUTURE GENERAL: Performed by: SURGERY

## 2021-05-19 PROCEDURE — 700111 HCHG RX REV CODE 636 W/ 250 OVERRIDE (IP): Performed by: ANESTHESIOLOGY

## 2021-05-19 PROCEDURE — A9270 NON-COVERED ITEM OR SERVICE: HCPCS | Performed by: ANESTHESIOLOGY

## 2021-05-19 PROCEDURE — 160041 HCHG SURGERY MINUTES - EA ADDL 1 MIN LEVEL 4: Performed by: SURGERY

## 2021-05-19 PROCEDURE — 76098 X-RAY EXAM SURGICAL SPECIMEN: CPT | Mod: RT

## 2021-05-19 PROCEDURE — 500002 HCHG ADHESIVE, DERMABOND: Performed by: SURGERY

## 2021-05-19 PROCEDURE — 160029 HCHG SURGERY MINUTES - 1ST 30 MINS LEVEL 4: Performed by: SURGERY

## 2021-05-19 PROCEDURE — 700101 HCHG RX REV CODE 250: Performed by: ANESTHESIOLOGY

## 2021-05-19 PROCEDURE — 160036 HCHG PACU - EA ADDL 30 MINS PHASE I: Performed by: SURGERY

## 2021-05-19 PROCEDURE — 700111 HCHG RX REV CODE 636 W/ 250 OVERRIDE (IP): Performed by: SURGERY

## 2021-05-19 PROCEDURE — 160009 HCHG ANES TIME/MIN: Performed by: SURGERY

## 2021-05-19 PROCEDURE — RXMED WILLOW AMBULATORY MEDICATION CHARGE: Performed by: SURGERY

## 2021-05-19 PROCEDURE — 700105 HCHG RX REV CODE 258: Performed by: SURGERY

## 2021-05-19 PROCEDURE — 38792 RA TRACER ID OF SENTINL NODE: CPT | Mod: RT

## 2021-05-19 PROCEDURE — 160035 HCHG PACU - 1ST 60 MINS PHASE I: Performed by: SURGERY

## 2021-05-19 RX ORDER — CEFAZOLIN SODIUM 1 G/3ML
INJECTION, POWDER, FOR SOLUTION INTRAMUSCULAR; INTRAVENOUS PRN
Status: DISCONTINUED | OUTPATIENT
Start: 2021-05-19 | End: 2021-05-19 | Stop reason: SURG

## 2021-05-19 RX ORDER — BUPIVACAINE HYDROCHLORIDE AND EPINEPHRINE 5; 5 MG/ML; UG/ML
INJECTION, SOLUTION PERINEURAL
Status: DISCONTINUED | OUTPATIENT
Start: 2021-05-19 | End: 2021-05-19 | Stop reason: HOSPADM

## 2021-05-19 RX ORDER — HYDRALAZINE HYDROCHLORIDE 20 MG/ML
5 INJECTION INTRAMUSCULAR; INTRAVENOUS
Status: DISCONTINUED | OUTPATIENT
Start: 2021-05-19 | End: 2021-05-19 | Stop reason: HOSPADM

## 2021-05-19 RX ORDER — SODIUM CHLORIDE, SODIUM LACTATE, POTASSIUM CHLORIDE, CALCIUM CHLORIDE 600; 310; 30; 20 MG/100ML; MG/100ML; MG/100ML; MG/100ML
INJECTION, SOLUTION INTRAVENOUS CONTINUOUS
Status: DISCONTINUED | OUTPATIENT
Start: 2021-05-19 | End: 2021-05-19 | Stop reason: HOSPADM

## 2021-05-19 RX ORDER — ONDANSETRON 2 MG/ML
INJECTION INTRAMUSCULAR; INTRAVENOUS PRN
Status: DISCONTINUED | OUTPATIENT
Start: 2021-05-19 | End: 2021-05-19 | Stop reason: SURG

## 2021-05-19 RX ORDER — ACETAMINOPHEN 500 MG
1000 TABLET ORAL ONCE
Status: COMPLETED | OUTPATIENT
Start: 2021-05-19 | End: 2021-05-19

## 2021-05-19 RX ORDER — BUPIVACAINE HYDROCHLORIDE AND EPINEPHRINE 5; 5 MG/ML; UG/ML
INJECTION, SOLUTION EPIDURAL; INTRACAUDAL; PERINEURAL
Status: DISCONTINUED
Start: 2021-05-19 | End: 2021-05-19 | Stop reason: HOSPADM

## 2021-05-19 RX ORDER — METOPROLOL TARTRATE 1 MG/ML
1 INJECTION, SOLUTION INTRAVENOUS
Status: DISCONTINUED | OUTPATIENT
Start: 2021-05-19 | End: 2021-05-19 | Stop reason: HOSPADM

## 2021-05-19 RX ORDER — HALOPERIDOL 5 MG/ML
1 INJECTION INTRAMUSCULAR
Status: DISCONTINUED | OUTPATIENT
Start: 2021-05-19 | End: 2021-05-19 | Stop reason: HOSPADM

## 2021-05-19 RX ORDER — LABETALOL HYDROCHLORIDE 5 MG/ML
5 INJECTION, SOLUTION INTRAVENOUS
Status: DISCONTINUED | OUTPATIENT
Start: 2021-05-19 | End: 2021-05-19 | Stop reason: HOSPADM

## 2021-05-19 RX ORDER — ISOSULFAN BLUE 50 MG/5ML
INJECTION, SOLUTION SUBCUTANEOUS
Status: DISCONTINUED
Start: 2021-05-19 | End: 2021-05-19 | Stop reason: HOSPADM

## 2021-05-19 RX ORDER — HYDROCODONE BITARTRATE AND ACETAMINOPHEN 5; 325 MG/1; MG/1
1-2 TABLET ORAL EVERY 4 HOURS PRN
Qty: 30 TABLET | Refills: 0 | Status: SHIPPED | OUTPATIENT
Start: 2021-05-19 | End: 2021-05-26

## 2021-05-19 RX ORDER — LIDOCAINE HYDROCHLORIDE 20 MG/ML
INJECTION, SOLUTION EPIDURAL; INFILTRATION; INTRACAUDAL; PERINEURAL PRN
Status: DISCONTINUED | OUTPATIENT
Start: 2021-05-19 | End: 2021-05-19 | Stop reason: SURG

## 2021-05-19 RX ORDER — MIDAZOLAM HYDROCHLORIDE 1 MG/ML
INJECTION INTRAMUSCULAR; INTRAVENOUS PRN
Status: DISCONTINUED | OUTPATIENT
Start: 2021-05-19 | End: 2021-05-19 | Stop reason: SURG

## 2021-05-19 RX ORDER — OXYCODONE HCL 5 MG/5 ML
10 SOLUTION, ORAL ORAL
Status: COMPLETED | OUTPATIENT
Start: 2021-05-19 | End: 2021-05-19

## 2021-05-19 RX ORDER — HYDROMORPHONE HYDROCHLORIDE 1 MG/ML
0.1 INJECTION, SOLUTION INTRAMUSCULAR; INTRAVENOUS; SUBCUTANEOUS
Status: DISCONTINUED | OUTPATIENT
Start: 2021-05-19 | End: 2021-05-19 | Stop reason: HOSPADM

## 2021-05-19 RX ORDER — HYDROMORPHONE HYDROCHLORIDE 1 MG/ML
0.2 INJECTION, SOLUTION INTRAMUSCULAR; INTRAVENOUS; SUBCUTANEOUS
Status: DISCONTINUED | OUTPATIENT
Start: 2021-05-19 | End: 2021-05-19 | Stop reason: HOSPADM

## 2021-05-19 RX ORDER — DEXAMETHASONE SODIUM PHOSPHATE 4 MG/ML
INJECTION, SOLUTION INTRA-ARTICULAR; INTRALESIONAL; INTRAMUSCULAR; INTRAVENOUS; SOFT TISSUE PRN
Status: DISCONTINUED | OUTPATIENT
Start: 2021-05-19 | End: 2021-05-19 | Stop reason: SURG

## 2021-05-19 RX ORDER — ONDANSETRON 2 MG/ML
4 INJECTION INTRAMUSCULAR; INTRAVENOUS
Status: DISCONTINUED | OUTPATIENT
Start: 2021-05-19 | End: 2021-05-19 | Stop reason: HOSPADM

## 2021-05-19 RX ORDER — OXYCODONE HCL 5 MG/5 ML
5 SOLUTION, ORAL ORAL
Status: COMPLETED | OUTPATIENT
Start: 2021-05-19 | End: 2021-05-19

## 2021-05-19 RX ORDER — DIPHENHYDRAMINE HYDROCHLORIDE 50 MG/ML
12.5 INJECTION INTRAMUSCULAR; INTRAVENOUS
Status: DISCONTINUED | OUTPATIENT
Start: 2021-05-19 | End: 2021-05-19 | Stop reason: HOSPADM

## 2021-05-19 RX ORDER — HYDROMORPHONE HYDROCHLORIDE 1 MG/ML
0.4 INJECTION, SOLUTION INTRAMUSCULAR; INTRAVENOUS; SUBCUTANEOUS
Status: DISCONTINUED | OUTPATIENT
Start: 2021-05-19 | End: 2021-05-19 | Stop reason: HOSPADM

## 2021-05-19 RX ORDER — ISOSULFAN BLUE 50 MG/5ML
INJECTION, SOLUTION SUBCUTANEOUS
Status: DISCONTINUED | OUTPATIENT
Start: 2021-05-19 | End: 2021-05-19 | Stop reason: HOSPADM

## 2021-05-19 RX ADMIN — MIDAZOLAM HYDROCHLORIDE 2 MG: 1 INJECTION, SOLUTION INTRAMUSCULAR; INTRAVENOUS at 13:48

## 2021-05-19 RX ADMIN — FENTANYL CITRATE 25 MCG: 50 INJECTION, SOLUTION INTRAMUSCULAR; INTRAVENOUS at 15:16

## 2021-05-19 RX ADMIN — PROPOFOL 160 MG: 10 INJECTION, EMULSION INTRAVENOUS at 13:53

## 2021-05-19 RX ADMIN — LIDOCAINE HYDROCHLORIDE 100 MG: 20 INJECTION, SOLUTION EPIDURAL; INFILTRATION; INTRACAUDAL at 13:52

## 2021-05-19 RX ADMIN — FENTANYL CITRATE 50 MCG: 50 INJECTION, SOLUTION INTRAMUSCULAR; INTRAVENOUS at 14:22

## 2021-05-19 RX ADMIN — ACETAMINOPHEN 1000 MG: 500 TABLET ORAL at 12:52

## 2021-05-19 RX ADMIN — OXYCODONE HYDROCHLORIDE 10 MG: 5 SOLUTION ORAL at 15:16

## 2021-05-19 RX ADMIN — POVIDONE IODINE 15 ML: 100 SOLUTION TOPICAL at 12:52

## 2021-05-19 RX ADMIN — FENTANYL CITRATE 100 MCG: 50 INJECTION, SOLUTION INTRAMUSCULAR; INTRAVENOUS at 13:51

## 2021-05-19 RX ADMIN — CEFAZOLIN 2 G: 330 INJECTION, POWDER, FOR SOLUTION INTRAMUSCULAR; INTRAVENOUS at 13:57

## 2021-05-19 RX ADMIN — ONDANSETRON 4 MG: 2 INJECTION INTRAMUSCULAR; INTRAVENOUS at 14:32

## 2021-05-19 RX ADMIN — SODIUM CHLORIDE, POTASSIUM CHLORIDE, SODIUM LACTATE AND CALCIUM CHLORIDE: 600; 310; 30; 20 INJECTION, SOLUTION INTRAVENOUS at 13:30

## 2021-05-19 RX ADMIN — DEXAMETHASONE SODIUM PHOSPHATE 8 MG: 4 INJECTION, SOLUTION INTRA-ARTICULAR; INTRALESIONAL; INTRAMUSCULAR; INTRAVENOUS; SOFT TISSUE at 14:01

## 2021-05-19 RX ADMIN — FENTANYL CITRATE 50 MCG: 50 INJECTION, SOLUTION INTRAMUSCULAR; INTRAVENOUS at 14:18

## 2021-05-19 RX ADMIN — FENTANYL CITRATE 50 MCG: 50 INJECTION, SOLUTION INTRAMUSCULAR; INTRAVENOUS at 14:03

## 2021-05-19 ASSESSMENT — PAIN DESCRIPTION - PAIN TYPE
TYPE: SURGICAL PAIN

## 2021-05-19 ASSESSMENT — PAIN SCALES - GENERAL: PAIN_LEVEL: 4

## 2021-05-19 NOTE — ANESTHESIA TIME REPORT
Anesthesia Start and Stop Event Times     Date Time Event    5/19/2021 1340 Ready for Procedure     1348 Anesthesia Start     1449 Anesthesia Stop        Responsible Staff  05/19/21    Name Role Begin End    Fred King M.D. Anesth 1348 1449        Preop Diagnosis (Free Text):  Pre-op Diagnosis     BREAST CANCER OF UPPER-OUTER QUADRANT RIGHT FEMALE BREAST, HYPERTENSION        Preop Diagnosis (Codes):    Post op Diagnosis  Breast cancer of upper-outer quadrant of right female breast (HCC)      Premium Reason  Non-Premium    Comments:

## 2021-05-19 NOTE — PROGRESS NOTES
Discharge Instructions for Lumpectomy and [place] Lymph Node Biospy:    On the day of surgery we will be removing the lump of your breast cancer (lumpectomy) and through a separate incision under your arm we will be taking out the 2-3 lymph nodes that drain your breast (sentinel lymph node dissection).    Wound Care  1. You will have 2 incisions: 1) on your breast (lumpectomy) and 2) under your arm (sentinel lymph node biopsy).  2. All of your stitches are buried under the skin and dissolveable. There are no sutures to remove.   3. You can shower the day after your surgery and get your wound wet (it will be sealed by the waterproof dressings)  4. You may have some bruising after surgery. This is normal.  5. There may be a small amount of drainage from your wounds, this is usually normal and nothing to worry about. Place a dry gauze or bandage (available at any drug store) on the wound to absorb any drainage.  6. You can remove the dressing 2 days after surgery.     For Pain  1. Wear your bra as much as possible including to bed. The less your breast moves the less it will hurt.  2. You may take Tylenol or Advil every 4-6 hours as directed on the bottle.  3. You will be given a prescription for more severe pain. You can use it as needed.    Work  1. If you would like, you may return to work the day after your biopsy.  2. If you need a work excuse for the day of surgery or for any days thereafter, please let us know.    When to call the doctor  1. If you have severe, uncontrolled pain at the biopsy site.  2. If you run at fever of 100.5?F or higher within a few days of the biopsy.  3. If you have a large amount of drainage that is soaking the bandage more than once a day.  4. If the area around your wound becomes red/inflamed.  5. Make sure you schedule and attend all follow-up visits with your doctor. You should have a follow up appointment in about a week after surgery.    If you have any additional questions,  please do not hesitate to call the office and speak to either myself or the physician on call.    Office address:  [address] [place] [address] [id number]  [place] [address]        Danielle Coulter MD  [facility]  [phone] d

## 2021-05-19 NOTE — OR NURSING
1444- Pt to PACU bay 4 from OR s/p right parital mastectomy. Report from anesthesia and OR RN. On 6L via mask. Oral airway in place. Jaw thrust to support airway. VSS. 2 incisions on right breast closed with dermabond, CDI.    1452- Oral airway D/C'd    1510- Handoff report to Linsey DIALLO.    1550- Report from Linsey RN, care reassumed.    1620- Pt's , Osiel, called and updated. Discharge instructions discussed. Verbalized understanding. Phase II criteria met.    1655- Pt getting dressed.  at bedside. PIV removed with tip intact.    1705- Pt escorted out of department in wheelchair by Nely DIALLO with and all belongings. Discharged home to responsible adult.

## 2021-05-19 NOTE — ANESTHESIA PROCEDURE NOTES
Airway    Date/Time: 5/19/2021 1:54 PM  Performed by: Fred King M.D.  Authorized by: Fred King M.D.     Location:  OR  Urgency:  Elective  Difficult Airway: No    Indications for Airway Management:  Anesthesia      Spontaneous Ventilation: absent    Sedation Level:  Deep  Preoxygenated: Yes    Mask Difficulty Assessment:  0 - not attempted  Final Airway Type:  Supraglottic airway  Final Supraglottic Airway:  Standard LMA    SGA Size:  4  Number of Attempts at Approach:  1  Ventilation Between Attempts:  None  Number of Other Approaches Attempted:  0

## 2021-05-19 NOTE — PROGRESS NOTES
Late entry from 05/18/2021  Oncology nurse navigator called patient to follow up on referral my 2nd attempt.  Oncology nurse navigator introduced self and my role and services.  She reports that she is scheduled for a simple mastectomy tomorrow.  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  Oncology nurse navigator explained that this would be determined by her surgeon.  She reports that she also received a referral for a lymphedema clinic at [facility].  She reports that she has been educated about lymphedema and wanted to know if she needed to make an appointment.  Oncology nurse navigator encouraged her to go ahead and make an appointment because sometimes the wait is a period of time so I have been told from other patients.  She also reports that she was told that she would be on some type of AI inhibitor.  Oncology confirmed that more than likely but again this would also depend on what the tissue shows after her surgery tomorrow.  Patient verbalized her understanding.  She reports that she is okay at this time financially but ONN educated her on [employer].  She reported that she was familar with it because they actually helped support one of her friends thorough breast cancer.  Patient wanted to know how to make an donation to [employer] and ONN explained that the application and donations could all be done online.  Patient verbalized understanding.  Oncology nurse navigator confirmed her my chart and she expressed that she would like my contact information sent to her my chart.

## 2021-05-19 NOTE — ANESTHESIA POSTPROCEDURE EVALUATION
Patient: Laura Cano    Procedure Summary     Date: 05/19/21 Room / Location: [facility] ROOM 23 / SURGERY SAME DAY [facility]    Anesthesia Start: 1348 Anesthesia Stop:     Procedures:       MASTECTOMY, PARTIAL - SHER PLACEMENT (Right Breast)      BIOPSY, LYMPH NODE, SENTINEL - AXILLARY AFTER INJECTION (Right Axilla) Diagnosis: (BREAST CANCER OF UPPER-OUTER QUADRANT RIGHT FEMALE BREAST, HYPERTENSION)    Surgeons: Danielle Coulter M.D. Responsible Provider: Fred King M.D.    Anesthesia Type: general ASA Status: 3          Final Anesthesia Type: general  Last vitals  BP   Blood Pressure: 148/76    Temp   36.3 °C (97.4 °F)    Pulse   85   Resp   16    SpO2   96 %      Anesthesia Post Evaluation    Patient location during evaluation: PACU  Patient participation: complete - patient participated  Level of consciousness: awake and alert  Pain score: 4    Airway patency: patent  Anesthetic complications: no  Cardiovascular status: hemodynamically stable  Respiratory status: acceptable  Hydration status: euvolemic    PONV: none          No complications documented.     Nurse Pain Score: 4 (NPRS)

## 2021-05-19 NOTE — ANESTHESIA PREPROCEDURE EVALUATION
Relevant Problems   NEURO   (positive) Other headache syndrome      CARDIAC   (positive) HTN (hypertension)      Other   (positive) Infiltrating ductal carcinoma of right breast (HCC)   (positive) Microcytic anemia   (positive) Obesity (BMI 30-39.9)       Physical Exam    Airway   Mallampati: II  TM distance: >3 FB  Neck ROM: full       Cardiovascular - normal exam  Rhythm: regular  Rate: normal  (-) murmur  Comments: By palpation of radial artery   Dental - normal exam           Pulmonary   Comments: No concerns   Abdominal    Neurological     Comments: A&Ox4, grossly intact             Anesthesia Plan    ASA 3 (breast cancer)   ASA physical status 3 criteria: other (comment)    Plan - general       Airway plan will be LMA          Induction: intravenous    Postoperative Plan: Postoperative administration of opioids is intended.    Pertinent diagnostic labs and testing reviewed    Informed Consent:    Anesthetic plan and risks discussed with patient.    Use of blood products discussed with: patient whom consented to blood products.

## 2021-05-19 NOTE — TELEPHONE ENCOUNTER
Sent pt. A MyChart message letting her know Dr. Haji placed an Urgent referral to [facility] and she should here back in the the 2-3 days.

## 2021-05-19 NOTE — DISCHARGE INSTRUCTIONS
Discharge Instructions for Lumpectomy and [place] Lymph Node Biospy:     On the day of surgery we will be removing the lump of your breast cancer (lumpectomy) and through a separate incision under your arm we will be taking out the 2-3 lymph nodes that drain your breast (sentinel lymph node dissection).     Wound Care  1. You will have 2 incisions: 1) on your breast (lumpectomy) and 2) under your arm (sentinel lymph node biopsy).  2. All of your stitches are buried under the skin and dissolveable. There are no sutures to remove.   3. You can shower the day after your surgery and get your wound wet (it will be sealed by the waterproof dressings)  4. You may have some bruising after surgery. This is normal.  5. There may be a small amount of drainage from your wounds, this is usually normal and nothing to worry about. Place a dry gauze or bandage (available at any drug store) on the wound to absorb any drainage.  6. You can remove the dressing 2 days after surgery.      For Pain  1. Wear your bra as much as possible including to bed. The less your breast moves the less it will hurt.  2. You may take Tylenol or Advil every 4-6 hours as directed on the bottle.  3. You will be given a prescription for more severe pain. You can use it as needed.     Work  1. If you would like, you may return to work the day after your biopsy.  2. If you need a work excuse for the day of surgery or for any days thereafter, please let us know.     When to call the doctor  1. If you have severe, uncontrolled pain at the biopsy site.  2. If you run at fever of 100.5?F or higher within a few days of the biopsy.  3. If you have a large amount of drainage that is soaking the bandage more than once a day.  4. If the area around your wound becomes red/inflamed.  5. Make sure you schedule and attend all follow-up visits with your doctor. You should have a follow up appointment in about a week after surgery.     If you have any additional  questions, please do not hesitate to call the office and speak to either myself or the physician on call.     Office address:   [address]  [place] [address]           Danielle Coulter MD  [facility]  [phone]            ACTIVITY: Rest and take it easy for the first 24 hours.  A responsible adult is recommended to remain with you during that time.  It is normal to feel sleepy.  We encourage you to not do anything that requires balance, judgment or coordination.    MILD FLU-LIKE SYMPTOMS ARE NORMAL. YOU MAY EXPERIENCE GENERALIZED MUSCLE ACHES, THROAT IRRITATION, HEADACHE AND/OR SOME NAUSEA.    FOR 24 HOURS DO NOT:  Drive, operate machinery or run household appliances.  Drink beer or alcoholic beverages.   Make important decisions or sign legal documents.    SPECIAL INSTRUCTIONS:     DIET: To avoid nausea, slowly advance diet as tolerated, avoiding spicy or greasy foods for the first day.  Add more substantial food to your diet according to your physician's instructions.  Babies can be fed formula or breast milk as soon as they are hungry.  INCREASE FLUIDS AND FIBER TO AVOID CONSTIPATION.    FOLLOW-UP APPOINTMENT:  A follow-up appointment should be arranged with your doctor in 1 week; call to schedule.    You should CALL YOUR PHYSICIAN if you develop:  Fever greater than 101 degrees F.  Pain not relieved by medication, or persistent nausea or vomiting.  Excessive bleeding (blood soaking through dressing) or unexpected drainage from the wound.  Extreme redness or swelling around the incision site, drainage of pus or foul smelling drainage.  Inability to urinate or empty your bladder within 8 hours.  Problems with breathing or chest pain.    You should call 911 if you develop problems with breathing or chest pain.  If you are unable to contact your doctor or surgical center, you should go to the nearest emergency room or urgent care center.  Physician's telephone #: [phone]    If any  questions arise, call your doctor.  If your doctor is not available, please feel free to call the Surgical Center at [phone]. The Contact Center is open Monday through Friday 7AM to 5PM and may speak to a nurse at [phone], or toll free at [phone].     A registered nurse may call you a few days after your surgery to see how you are doing after your procedure.    MEDICATIONS: Resume taking daily medication.  Take prescribed pain medication with food.  If no medication is prescribed, you may take non-aspirin pain medication if needed.  PAIN MEDICATION CAN BE VERY CONSTIPATING.  Take a stool softener or laxative such as senokot, pericolace, or milk of magnesia if needed.    Prescription given for NORCO .  Last pain medication given at 1516- Oxycodone. Tylenol given at 1252. Ok to take take Norco dose at 7:15 pm if needed.     If your physician has prescribed pain medication that includes Acetaminophen (Tylenol), do not take additional Acetaminophen (Tylenol) while taking the prescribed medication.    Depression / Suicide Risk    As you are discharged from this [facility] facility, it is important to learn how to keep safe from harming yourself.    Recognize the warning signs:  · Abrupt changes in personality, positive or negative- including increase in energy   · Giving away possessions  · Change in eating patterns- significant weight changes-  positive or negative  · Change in sleeping patterns- unable to sleep or sleeping all the time   · Unwillingness or inability to communicate  · Depression  · Unusual sadness, discouragement and loneliness  · Talk of wanting to die  · Neglect of personal appearance   · Rebelliousness- reckless behavior  · Withdrawal from people/activities they love  · Confusion- inability to concentrate     If you or a loved one observes any of these behaviors or has concerns about self-harm, here's what you can do:  · Talk about it- your feelings and reasons for harming  yourself  · Remove any means that you might use to hurt yourself (examples: pills, rope, extension cords, firearm)  · Get professional help from the community (Mental Health, Substance Abuse, psychological counseling)  · Do not be alone:Call your Safe Contact- someone whom you trust who will be there for you.  · Call your local CRISIS HOTLINE 925-9766 or 228-017-3367  · Call your local Children's Mobile Crisis Response Team [place] (048) 400-0025 or www.[place]  · Call the toll free National Suicide Prevention Hotlines   · National Suicide Prevention Lifeline 145-650-XEYD (3819)  · National Hope Line Network 800-SUICIDE (695-6515)

## 2021-05-19 NOTE — OP REPORT
Operative Report    Date: 5/19/2021    Surgeon: Danielle Coulter M.D.    Assistant: Kirit HERRING    My assistant, NIDHI Cerna, was medically necessary for this procedure. He injected the patient with 5 cc of methylene blue in order to facilitate identification of the sentinel lymph nodes, retracted the tissues necessary so I could perform the sentinel lymph node biopsy, assisted me in achieving hemostasis, and assisted in incisional closure.     Anesthesiologist: Selina LANGSTON     Pre-operative Diagnosis:  C50.411 Malignant neoplasm of upper-outer quadrant of right female breast    Post-operative Diagnosis: same     Procedure: right breast SHER localized partial mastectomy, left axillary sentinel lymph node biopsy, injection for identification of sentinel lymph node    29222 Mastectomy, partial   92821 Biopsy or excision of lymph node(s); open, deep axillary node(s)  50549 Intraoperative identification (eg, mapping) of sentinel lymph node(s) includes injection of non-radioactive dye    Findings: right SHER and biopsy clips within excised specimen.    Procedure in detail: The patient was identified in the pre-operative holding area and brought to the operating room. Prior to the procedure, she underwent SHER  localization with radiology due to the non-palpable nature of the lesion. Of note, there were 2 SHER scots in the breast as one had migrated after placement, and this was discussed with radiology pre-operatively. As well, she underwent radionucleotide injection by nuclear medicine.    Correct side and site were identified. Pre-operative antibiotics of ancef were administered prior to the procedure. Anesthesia was smoothly induced.    I injected 5 cc of methylene blue under the areola of the right breast, and the breast was then massaged briefly. The patient was then prepped and draped in the usual sterile fashion.    I then turned my attention to the partial mastectomy. The skin was infiltrated with  local anesthetic and a curvilinear incision was made in the upper outer quadrant. The breast tissue was grasped with Allis clamps and a core of tissue was removed around the SHER. The specimen was then completely removed, marked with suture for orientation, and was sent for specimen radiography, which revealed both SHER scouts to be within the excised specimen.    Additional tissue from the superior, inferior, deep, medial and lateral margins were excised, marked for new margin, and sent for permanent pathology. The inferior margin contained the original biopsy clip. Meticulous hemostasis was achieved with electrocautery. The area was irrigated and suctioned. The skin was closed in two layers with vicryl and monocryl.     I proceeded with the sentinel lymph node biopsy portion of the procedure. A transverse incision was made in the lower end of the axilla after anesthetizing the skin. The incision was carried deeper into the axillary tissue and a blue-stained lymphatic was identified and traced distally to a lymph node, which was partially blue stained and highly radioactive. This was dissected free from its surrounding tissue.     There was a second blue-stained node found deep  to that, which also was radioactive. This was sent to pathology for immediate evaluation as well. The residual radioactivity in the surgical bed was <10% of the initial count. These were sent to pathology.    After ensuring hemostasis, and irrigating the wound, I then closed the wound in two layers with vicryl and monocryl.     Sterile dressings were applied.     The patient was awakened from anesthetic, and was taken to the recovery room in stable condition.    Sponge and needle counts were correct at the end of the case.     Specimen:   1. right axillary sentinel lymph node tissue  2. right breast partial mastecomy  3. Additional tissue, deep margin, suture marks new margin  4. Additional tissue, superior margin, suture marks new  margin  5. Additional tissue, inferior margin, suture marks new margin  6. Additional tissue, medial margin, suture marks new margin  7. Additional tissue, lateral margin, suture marks new margin    EBL: minimal    Dispo: stable, extubated, to PACU    Danielle Coulter M.D.  [facility]  [phone]

## 2021-05-19 NOTE — OR NURSING
1510 Handoff report received from April & Soumya Alanis. Pt awake & talking with family member on phone.     1517 medicated for pain 4/10. See MAR.     1552 Handoff report given to YOEL Massey.

## 2021-05-19 NOTE — TELEPHONE ENCOUNTER
Pt left a voicemail asking if we can put in a referral to [facility] and would like to have it done by today.

## 2021-06-06 NOTE — PROGRESS NOTES
Subjective:     CC: elbow pain    HPI:   Laura presents today with     HTN (hypertension)  Chronic issue.  No chest pain, palpitations, SOB. She has been taking lisinopril 20mg daily without negative Ses.  Home blood pressures are consistently 130s/80s.  Had one episode of SBP of 200 but this was when she did not take her medication.      Infiltrating ductal carcinoma of right breast (HCC)  Dx 4/26/21 after diagnostic mammogram and US was category 4 and biopsy on 5/3/21 showed infiltrating ductal carcinoma of the right breast.  She is established with surgery who recently performed surgery with negative borders and negative lymph nodes.      Pt has a radiation oncology appointment tomorrow with Dr. Carty and sees Dr. Mariano for medical oncology next week (to discuss tumor evaluation regarding chemo vs radiation vs combination).    Reports elbow discomfort for which she initially booked this appointment is no longer bothering her.    Past Medical History:   Diagnosis Date   • Acne 4/16/2012   • Arthritis     Right elbow   • Cancer (HCC) 2021    Breast cancer   • Dental disorder     Upper   • Dysuria 4/16/2012   • Family history of colon cancer 4/16/2012   • HTN (hypertension) 4/16/2012   • Microcytic anemia 8/22/2012   • Thrombosed external hemorrhoid 10/24/2012   • Vitamin d deficiency 4/16/2012       Social History     Tobacco Use   • Smoking status: Never Smoker   • Smokeless tobacco: Never Used   Vaping Use   • Vaping Use: Never used   Substance Use Topics   • Alcohol use: Yes     Comment: 4 per week   • Drug use: No       Current Outpatient Medications Ordered in Epic   Medication Sig Dispense Refill   • ASPIRIN 81 PO Take  by mouth.     • lisinopril (PRINIVIL) 20 MG Tab Take 1 tablet by mouth every day. To replace lisinopril 10mg daily. 90 tablet 3     No current Epic-ordered facility-administered medications on file.       Allergies:  Sulfa drugs    Health Maintenance: pap smear to be done with  "gynecologist asap recommended.  She declines vaccinations    ROS:  Gen: no fevers/chills, no changes in weight  Pulm: no sob, no cough  CV: no chest pain, no palpitations  GI: no nausea/vomiting, no diarrhea  Neuro: no headaches, no numbness/tingling  Heme/Lymph: no easy bruising      Objective:       Exam:  /82 (BP Location: Left arm, Patient Position: Sitting, BP Cuff Size: Adult)   Pulse 80   Temp 36.9 °C (98.5 °F) (Temporal)   Resp 12   Ht 1.778 m (5' 10\")   Wt 107 kg (235 lb)   LMP  (LMP Unknown) Comment: October 2020  SpO2 100%   BMI 33.72 kg/m²  Body mass index is 33.72 kg/m².    Gen: Alert and oriented, No apparent distress.  Neck: Neck is supple without lymphadenopathy.  Lungs: Normal effort, CTA bilaterally, no wheezes, rhonchi, or rales  CV: Regular rate and rhythm. No murmurs, rubs, or gallops.  Ext: No clubbing, cyanosis, edema.    Assessment & Plan:     55 y.o. female with the following -     1. Infiltrating ductal carcinoma of right breast (HCC)  Acute issue s/p surgical resection.  Pt to see lymph specialist next week and radiation oncology tomorrow. Recommended she attend these appointments as scheduled and pt to discuss PET scan at that time to help guide further treatment (she would like PET prior to further intervention to help determine risks and benefits of treatment).  Recommended pap asap which she plans to get with her gynecologist.  Will monitor    2. Essential hypertension  Chronic, improved.  Continue current medication.  Will monitor.  Follow-up precautions given. Patient expressed understanding and agreement with plan.  - lisinopril (PRINIVIL) 20 MG Tab; Take 1 tablet by mouth every day. To replace lisinopril 10mg daily.  Dispense: 90 tablet; Refill: 3      Return in about 3 months (around 9/7/2021) for Medication review.    Please note that this dictation was created using voice recognition software. I have made every reasonable attempt to correct obvious errors, but I " expect that there are errors of grammar and possibly content that I did not discover before finalizing the note.

## 2021-06-07 ENCOUNTER — OFFICE VISIT (OUTPATIENT)
Dept: MEDICAL GROUP | Facility: MEDICAL CENTER | Age: 56
End: 2021-06-07
Payer: COMMERCIAL

## 2021-06-07 VITALS
BODY MASS INDEX: 33.64 KG/M2 | WEIGHT: 235 LBS | HEART RATE: 80 BPM | OXYGEN SATURATION: 100 % | TEMPERATURE: 98.5 F | HEIGHT: 70 IN | RESPIRATION RATE: 12 BRPM | SYSTOLIC BLOOD PRESSURE: 138 MMHG | DIASTOLIC BLOOD PRESSURE: 82 MMHG

## 2021-06-07 DIAGNOSIS — C50.911 INFILTRATING DUCTAL CARCINOMA OF RIGHT BREAST (HCC): ICD-10-CM

## 2021-06-07 DIAGNOSIS — I10 ESSENTIAL HYPERTENSION: ICD-10-CM

## 2021-06-07 PROCEDURE — 99213 OFFICE O/P EST LOW 20 MIN: CPT | Performed by: FAMILY MEDICINE

## 2021-06-07 RX ORDER — LISINOPRIL 20 MG/1
20 TABLET ORAL DAILY
Qty: 90 TABLET | Refills: 3 | Status: SHIPPED | OUTPATIENT
Start: 2021-06-07 | End: 2022-04-06

## 2021-06-07 NOTE — ASSESSMENT & PLAN NOTE
Dx 4/26/21 after diagnostic mammogram and US was category 4 and biopsy on 5/3/21 showed infiltrating ductal carcinoma of the right breast.  She is established with surgery who recently performed surgery with negative borders and negative lymph nodes.      Pt has a radiation oncology appointment tomorrow with Dr. Carty and sees Dr. Mariano for medical oncology next week (to discuss tumor evaluation regarding chemo vs radiation vs combination).

## 2021-06-07 NOTE — ASSESSMENT & PLAN NOTE
Chronic issue.  No chest pain, palpitations, SOB. She has been taking lisinopril 20mg daily without negative Ses.  Home blood pressures are consistently 130s/80s.  Had one episode of SBP of 200 but this was when she did not take her medication.

## 2021-06-08 ENCOUNTER — HOSPITAL ENCOUNTER (OUTPATIENT)
Dept: RADIATION ONCOLOGY | Facility: MEDICAL CENTER | Age: 56
End: 2021-06-30
Attending: RADIOLOGY
Payer: COMMERCIAL

## 2021-06-08 VITALS
HEART RATE: 89 BPM | BODY MASS INDEX: 33.36 KG/M2 | HEIGHT: 70 IN | DIASTOLIC BLOOD PRESSURE: 92 MMHG | TEMPERATURE: 97.2 F | OXYGEN SATURATION: 98 % | WEIGHT: 233 LBS | SYSTOLIC BLOOD PRESSURE: 148 MMHG

## 2021-06-08 DIAGNOSIS — Z17.0 MALIGNANT NEOPLASM OF UPPER-OUTER QUADRANT OF RIGHT BREAST IN FEMALE, ESTROGEN RECEPTOR POSITIVE (HCC): ICD-10-CM

## 2021-06-08 DIAGNOSIS — C50.411 MALIGNANT NEOPLASM OF UPPER-OUTER QUADRANT OF RIGHT BREAST IN FEMALE, ESTROGEN RECEPTOR POSITIVE (HCC): ICD-10-CM

## 2021-06-08 PROCEDURE — 99214 OFFICE O/P EST MOD 30 MIN: CPT | Performed by: RADIOLOGY

## 2021-06-08 PROCEDURE — 99205 OFFICE O/P NEW HI 60 MIN: CPT | Performed by: RADIOLOGY

## 2021-06-08 RX ORDER — HYDROCODONE BITARTRATE AND ACETAMINOPHEN 5; 325 MG/1; MG/1
1 TABLET ORAL EVERY 4 HOURS PRN
COMMUNITY
End: 2022-05-11

## 2021-06-08 ASSESSMENT — PAIN SCALES - GENERAL
PAINLEVEL: 5=MODERATE PAIN
PAINLEVEL: 5=MODERATE PAIN

## 2021-06-08 NOTE — NON-PROVIDER
"Patient was seen today in clinic with Dr. Carty for consultation.  Vitals signs and weight were obtained and pain assessment was completed.  Allergies and medications were reviewed with the patient.      Vitals/Pain:  Vitals:    06/08/21 1304   BP: 148/92   Pulse: 89   Temp: 36.2 °C (97.2 °F)   SpO2: 98%   Weight: 106 kg (233 lb)   Height: 1.778 m (5' 10\")   Pain Score: 5=Moderate Pain    Pain Scale: 0-10  Pain Assessement: Initial  Pain Location, Orientation and Scale: 5/10 rt upper arm/rt axilla.  What makes the pain better: nothing - patient has appt w/ lymphedema clinic 6/18/2021.  What makes the pain worse: patient reports her pain is due to lymphedema right upper arm/right axilla.    Allergies:   Sulfa drugs    Current Medications:  Current Outpatient Medications   Medication Sig Dispense Refill   • HYDROcodone-acetaminophen (NORCO) 5-325 MG Tab per tablet Take 1 tablet by mouth every four hours as needed.     • ASPIRIN 81 PO Take  by mouth.     • lisinopril (PRINIVIL) 20 MG Tab Take 1 tablet by mouth every day. To replace lisinopril 10mg daily. 90 tablet 3     No current facility-administered medications for this encounter.           Daly Delacruz R.N.  "

## 2021-06-08 NOTE — CT SIMULATION
PATIENT NAME Laura Cano   PRIMARY PHYSICIAN AdithyaDiamondDaly W [id number]   REFERRING PHYSICIAN Akilah Mariee M.D. 1965     Infiltrating ductal carcinoma of right breast (HCC)  Staging form: Breast, AJCC 8th Edition  - Pathologic: Stage IA (pT1b, pN0(sn), cM0, G2, ER+, ND-, HER2-) - Signed by Maggie Carty M.D. on 6/7/2021  Stage prefix: Initial diagnosis  Method of lymph node assessment: [place] lymph node biopsy  Histologic grading system: 3 grade system         Treatment Planning CT Simulation      Order Questions     Question Answer Comment    Is this for a new course of treatment? Yes     Is this an Addendum? No     Implanted Device/Pacemaker No     Simulation Status Initial     Treatment Technique 3D CRT     Treatment Pattern/Frequency Daily     Concurrent Chemotherapy No     CT Technique 3D possible    Slice Thickness 3mm     Scan Extent Chest     Treatment Device(s) Vac Sid      Wing Board     Treatment Marker Scar      Breast Borders     Patient Attire Gown     Patient Position Supine     Patient Orientation Head First     Arm Position Up     Treatment Machine No preference     Treatment Image Guidance Ports     Frequency (ports) Weekly     Image Guidance Match PTV - Soft Tissue     Other Orders Weekly Physics Check

## 2021-06-08 NOTE — CONSULTS
RADIATION ONCOLOGY CONSULT    DATE OF SERVICE: 6/8/2021    IDENTIFICATION: A 55 y.o. female with a pT1bN0 Grade 1 ER 60 CT less than 1 Ki-67 less than 3 HER-2 negative infiltrating ductal carcinoma of the right upper outer quadrant of the breast status post lumpectomy and sentinel node dissection with negative margins and negative nodes..  She is here at the kind request of Dr. Danielle Coulter.      HISTORY OF PRESENT ILLNESS: Patient's history dates back to a routine mammogram on 4/22/2021 that showed architectural distortion in the right upper outer quadrant.  Ultrasound4/26/2021 showed architectural distortion no axillary adenopathy there was no cyst or solid mass.Biopsy 5/3/2021 showed HER-2 negative infiltrating ductal carcinoma grade 1 ER 60% CT less than 1% Ki-67 less than 3% Oncotype was 19.  She underwent lumpectomy and sentinel node dissection on 5/9/2021 thisBiopsy 5/3/2021 showed HER-2 negative infiltrating ductal carcinoma grade 1 ER 60% CT less than 1% Ki-67 less than 3% Oncotype was 19.  She underwent lumpectomy and sentinel node dissection on 5/9/2021 this Showed an infiltrating ductal carcinoma grade 2 9 mm greater than 2 mm margins.  There was no lymph vascular invasion and no lymph nodes.  Postoperatively she has been doing well except for some swelling in her right axilla for which she has been referred to lymphedema clinic.  Since her Oncotype is 19 she is going to get endocrine therapy.  She is here to discuss radiation therapy to decrease her chance of local regional recurrence.    PAST MEDICAL HISTORY:   Past Medical History:   Diagnosis Date   • Acne 4/16/2012   • Arthritis     Right elbow   • Cancer (HCC) 2021    Breast cancer   • Dental disorder     Upper   • Dysuria 4/16/2012   • Family history of colon cancer 4/16/2012   • HTN (hypertension) 4/16/2012   • Hypertension    • Microcytic anemia 8/22/2012   • Thrombosed external hemorrhoid 10/24/2012   • Vitamin d deficiency 4/16/2012        PAST SURGICAL HISTORY:  Past Surgical History:   Procedure Laterality Date   • PB MASTECTOMY, PARTIAL Right 2021    Procedure: MASTECTOMY, PARTIAL - SHER PLACEMENT;  Surgeon: Danielle Coulter M.D.;  Location: SURGERY SAME DAY [facility];  Service: General   • NODE BIOPSY SENTINEL Right 2021    Procedure: BIOPSY, LYMPH NODE, SENTINEL - AXILLARY AFTER INJECTION;  Surgeon: Danielle Coulter M.D.;  Location: SURGERY SAME DAY [facility];  Service: General   • TONSILLECTOMY     • TUBAL LIGATION         GYNECOLOGICAL STATUS:  Last Menstrual Cycle: 2020  Menses Start Age: 17     & Para:   : 3, Para: 3 and Number of Interrupted Pregnancies: 0    Menopause Status: Emily    Gynecological comments: BCP  4-5 years      CURRENT MEDICATIONS:  Current Outpatient Medications   Medication Sig Dispense Refill   • HYDROcodone-acetaminophen (NORCO) 5-325 MG Tab per tablet Take 1 tablet by mouth every four hours as needed.     • ASPIRIN 81 PO Take  by mouth.     • lisinopril (PRINIVIL) 20 MG Tab Take 1 tablet by mouth every day. To replace lisinopril 10mg daily. 90 tablet 3     No current facility-administered medications for this encounter.       ALLERGIES:    Sulfa drugs    FAMILY HISTORY:    Family History   Problem Relation Age of Onset   • Lung Disease Father         emphysema   • Heart Disease Father         d. MI age 67   • Hypertension Father    • Lung Disease Mother         emphysema   • Osteoporosis Mother    • Diabetes Brother    [unfilled]        SOCIAL HISTORY:     reports that she has never smoked. She has never used smokeless tobacco. She reports current alcohol use. She reports that she does not use drugs.   Patient is , lives in [facility] and  has 3 biological children and 2 adopted children. Patient is a  for [employer]. Patient is accompanied for today's appointment  by her very supportive ex-.       REVIEW OF SYSTEMS: Pertinent positives consist of  Pain from  "the lymphedema in her axilla she has been up 5pounds she has fatigue and anxiety with the diagnosis of cancer.  The rest of the review of systems is negative and has been reviewed.     Pain Scale: 0-10  Pain Assessement: Initial  Pain Location, Orientation and Scale: 5/10 rt upper arm/rt axilla.  What makes the pain better: nothing - patient has appt w/ lymphedema clinic 6/18/2021.  What makes the pain worse: patient reports her pain is due to lymphedema right upper arm/right axilla.        PHYSICAL EXAM:    1= Restricted in physically strenuous activity, but ambulatory and able to carry out work of a light sedentary nature, e.g., light housework, office work.  Vitals:    06/08/21 1304   BP: 148/92   Pulse: 89   Temp: 36.2 °C (97.2 °F)   SpO2: 98%   Weight: 106 kg (233 lb)   Height: 1.778 m (5' 10\")   Pain Score: 5=Moderate Pain        GENERAL: Well-appearing alert and oriented x3 in no apparent distress  Breasts: Large pendulous with fibroglandular changes.  She has postoperative changes in the upper outer quadrant of the right breast and a large hematoma/seroma in the right axilla.  HEENT:  Pupils are equal, round, and reactive to light.  Extraocular muscles   are intact. Sclerae nonicteric.  Conjunctivae pink.  Oral cavity, tongue   protrudes midline.   NECK:   No peripheral adenopathy of the neck, supraclavicular fossa or axillae   bilaterally.  LUNGS:  Clear to ascultation   HEART:  Regular rate and rhythm.  No murmur appreciated  ABDOMEN:  Soft. No evidence of hepatosplenomegaly.    EXTREMITIES:  Without Edema.  NEUROLOGIC:  Cranial nerves II through XII were intact. Normal stance and gait motor and sensory grossly within normal limits        IMPRESSION:    A 55 y.o. with  a pT1bN0 Grade 1 ER 60 MO less than 1 Ki-67 less than 3 HER-2 negative infiltrating ductal carcinoma of the right upper outer quadrant of the breast status post lumpectomy and sentinel node dissection with negative margins and negative " nodes...      RECOMMENDATIONS:   I had a long discussion with the patient and her ex- regarding diagnosis prognosis and treatment.  They understand the goal of radiation therapy is to decrease the chance of local regional recurrence.  I discussed long course and short course of radiation therapy and I am recommending 15 fractions with no boost.  I've described the details of radiation along with the side effects both acute and chronic, including but not exclusive to fatigue, skin reaction, local soreness, swelling, delayed healing, scarring fibrosis discoloration. Ample time was allowed for questions, and patient understands.    She is tentatively scheduled for simulation to get started soon thereafter.    Thank you for the opportunity to participate in her care.  If any questions or comments, please do not hesitate in calling.    Please note that this dictation was created using voice recognition software. I have made every reasonable attempt to correct obvious errors, but I expect that there are errors of grammar and possibly content that I did not discover before finalizing the note.

## 2021-06-09 ENCOUNTER — HOSPITAL ENCOUNTER (OUTPATIENT)
Dept: RADIATION ONCOLOGY | Facility: MEDICAL CENTER | Age: 56
End: 2021-06-09

## 2021-06-09 PROCEDURE — 77263 THER RADIOLOGY TX PLNG CPLX: CPT | Performed by: RADIOLOGY

## 2021-06-09 PROCEDURE — 77334 RADIATION TREATMENT AID(S): CPT | Mod: 26 | Performed by: RADIOLOGY

## 2021-06-09 PROCEDURE — 77290 THER RAD SIMULAJ FIELD CPLX: CPT | Performed by: RADIOLOGY

## 2021-06-09 PROCEDURE — 77334 RADIATION TREATMENT AID(S): CPT | Performed by: RADIOLOGY

## 2021-06-09 PROCEDURE — 77290 THER RAD SIMULAJ FIELD CPLX: CPT | Mod: 26 | Performed by: RADIOLOGY

## 2021-06-15 PROCEDURE — 77295 3-D RADIOTHERAPY PLAN: CPT | Performed by: RADIOLOGY

## 2021-06-15 PROCEDURE — 77334 RADIATION TREATMENT AID(S): CPT | Mod: 26 | Performed by: RADIOLOGY

## 2021-06-15 PROCEDURE — 77295 3-D RADIOTHERAPY PLAN: CPT | Mod: 26 | Performed by: RADIOLOGY

## 2021-06-15 PROCEDURE — 77334 RADIATION TREATMENT AID(S): CPT | Performed by: RADIOLOGY

## 2021-06-15 PROCEDURE — 77300 RADIATION THERAPY DOSE PLAN: CPT | Performed by: RADIOLOGY

## 2021-06-15 PROCEDURE — 77300 RADIATION THERAPY DOSE PLAN: CPT | Mod: 26 | Performed by: RADIOLOGY

## 2021-06-16 ENCOUNTER — HOSPITAL ENCOUNTER (OUTPATIENT)
Dept: RADIATION ONCOLOGY | Facility: MEDICAL CENTER | Age: 56
End: 2021-06-16
Payer: COMMERCIAL

## 2021-06-16 VITALS
OXYGEN SATURATION: 97 % | WEIGHT: 229.5 LBS | TEMPERATURE: 98.7 F | SYSTOLIC BLOOD PRESSURE: 138 MMHG | DIASTOLIC BLOOD PRESSURE: 86 MMHG | HEART RATE: 86 BPM | BODY MASS INDEX: 32.93 KG/M2

## 2021-06-16 LAB
CHEMOTHERAPY INFUSION START DATE: NORMAL
CHEMOTHERAPY RECORDS: 2.67
CHEMOTHERAPY RECORDS: 4005
CHEMOTHERAPY RECORDS: NORMAL
CHEMOTHERAPY RX CANCER: NORMAL
DATE 1ST CHEMO CANCER: NORMAL
RAD ONC ARIA COURSE LAST TREATMENT DATE: NORMAL
RAD ONC ARIA COURSE TREATMENT ELAPSED DAYS: NORMAL
RAD ONC ARIA REFERENCE POINT DOSAGE GIVEN TO DATE: 2.67
RAD ONC ARIA REFERENCE POINT DOSAGE GIVEN TO DATE: 2.67
RAD ONC ARIA REFERENCE POINT ID: NORMAL
RAD ONC ARIA REFERENCE POINT ID: NORMAL
RAD ONC ARIA REFERENCE POINT SESSION DOSAGE GIVEN: 2.67
RAD ONC ARIA REFERENCE POINT SESSION DOSAGE GIVEN: 2.67

## 2021-06-16 PROCEDURE — 77280 THER RAD SIMULAJ FIELD SMPL: CPT | Mod: 26 | Performed by: RADIOLOGY

## 2021-06-16 PROCEDURE — 77280 THER RAD SIMULAJ FIELD SMPL: CPT | Performed by: RADIOLOGY

## 2021-06-16 PROCEDURE — 77412 RADIATION TX DELIVERY LVL 3: CPT | Performed by: RADIOLOGY

## 2021-06-16 ASSESSMENT — PAIN SCALES - GENERAL: PAINLEVEL: NO PAIN

## 2021-06-16 NOTE — ON TREATMENT VISIT
ON TREATMENT NOTE  RADIATION ONCOLOGY DEPARTMENT    Patient name:  Laura Cano    Primary Physician:  Daly Haji D.O. MRN: [id number]  CSN: [id number]   Referring physician:  Akilah Mariee M.D. : 1965, 55 y.o.     ENCOUNTER DATE:  21    DIAGNOSIS:    Infiltrating ductal carcinoma of right breast (HCC)  Staging form: Breast, AJCC 8th Edition  - Pathologic: Stage IA (pT1b, pN0(sn), cM0, G2, ER+, [place]-, HER2-) - Signed by Maggie Carty M.D. on 2021  Stage prefix: Initial diagnosis  Method of lymph node assessment: [place] lymph node biopsy  Histologic grading system: 3 grade system      TREATMENT SUMMARY:  Radiation Treatments     Active   Plans   R_Breast   Most recent treatment: Dose planned: 267 cGy (fraction 1 of 15 on 2021)   Total: Dose planned: 4,005 cGy   Elapsed Days: 0 @ [id number]      Reference Points   R_Breast   Most recent treatment: Dose given: 267 cGy (on 2021)   Total: Dose given: 267 cGy   Elapsed Days: 0 @ [id number]      R_Breast CP   Most recent treatment: Dose given: 267 cGy (on 2021)   Total: Dose given: 267 cGy   Elapsed Days: 0 @ [id number]                    SUBJECTIVE:   Tolerating therapy without event      VITAL SIGNS:  /86 (BP Location: Left arm, Patient Position: Sitting, BP Cuff Size: Adult)   Pulse 86   Temp 37.1 °C (98.7 °F) (Temporal)   Wt 104 kg (229 lb 8 oz)   SpO2 97%    Encounter Vitals  Temperature: 37.1 °C (98.7 °F)  Temp src: Temporal  Blood Pressure: 138/86  Pulse: 86  Pulse Oximetry: 97 %  Weight: 104 kg (229 lb 8 oz)  Pain Score: No pain  Pain Assessment 2021   Pain Assessment Denies Pain Acute Pain -   Pain Score 0 5 5   Pain Loc - Breast -   What increases pain? - patient reports she has pain due to lymphadema in rt upper arm /rt axilla. Pt has appt w/ lymphadema clinic 21 -   What decreases pain? - nothing -   Some recent data might be hidden          PHYSICAL  EXAM:    Mild erythema in the treatment field    Toxicity Assessment 6/16/2021   Toxicity Assessment Breast   Fatigue (lethargy, malaise, asthenia) None   Fever (in the absence of neutropenia) None   Radiation Dermatitis None   Lymphatics Normal   RT - Pain due to RT None   Dyspnea Normal         IMPRESSION:  Cancer Staging  Infiltrating ductal carcinoma of right breast (HCC)  Staging form: Breast, AJCC 8th Edition  - Pathologic: Stage IA (pT1b, pN0(sn), cM0, G2, ER+, [place]-, HER2-) - Signed by Maggie Carty M.D. on 6/7/2021      PLAN:  No change in treatment plan    Disposition:  Treatment plan reviewed. Questions answered. Continue therapy outlined.     Maggie Carty M.D.    No orders of the defined types were placed in this encounter.

## 2021-06-17 ENCOUNTER — HOSPITAL ENCOUNTER (OUTPATIENT)
Dept: RADIATION ONCOLOGY | Facility: MEDICAL CENTER | Age: 56
End: 2021-06-17
Payer: COMMERCIAL

## 2021-06-17 LAB
CHEMOTHERAPY INFUSION START DATE: NORMAL
CHEMOTHERAPY RECORDS: 2.67
CHEMOTHERAPY RECORDS: 4005
CHEMOTHERAPY RECORDS: NORMAL
CHEMOTHERAPY RX CANCER: NORMAL
DATE 1ST CHEMO CANCER: NORMAL
RAD ONC ARIA COURSE LAST TREATMENT DATE: NORMAL
RAD ONC ARIA COURSE TREATMENT ELAPSED DAYS: NORMAL
RAD ONC ARIA REFERENCE POINT DOSAGE GIVEN TO DATE: 5.34
RAD ONC ARIA REFERENCE POINT DOSAGE GIVEN TO DATE: 5.34
RAD ONC ARIA REFERENCE POINT ID: NORMAL
RAD ONC ARIA REFERENCE POINT ID: NORMAL
RAD ONC ARIA REFERENCE POINT SESSION DOSAGE GIVEN: 2.67
RAD ONC ARIA REFERENCE POINT SESSION DOSAGE GIVEN: 2.67

## 2021-06-17 PROCEDURE — 77412 RADIATION TX DELIVERY LVL 3: CPT | Performed by: RADIOLOGY

## 2021-06-18 ENCOUNTER — HOSPITAL ENCOUNTER (OUTPATIENT)
Dept: RADIATION ONCOLOGY | Facility: MEDICAL CENTER | Age: 56
End: 2021-06-18
Payer: COMMERCIAL

## 2021-06-18 LAB
CHEMOTHERAPY INFUSION START DATE: NORMAL
CHEMOTHERAPY RECORDS: 2.67
CHEMOTHERAPY RECORDS: 4005
CHEMOTHERAPY RECORDS: NORMAL
CHEMOTHERAPY RX CANCER: NORMAL
DATE 1ST CHEMO CANCER: NORMAL
RAD ONC ARIA COURSE LAST TREATMENT DATE: NORMAL
RAD ONC ARIA COURSE TREATMENT ELAPSED DAYS: NORMAL
RAD ONC ARIA REFERENCE POINT DOSAGE GIVEN TO DATE: 8.01
RAD ONC ARIA REFERENCE POINT DOSAGE GIVEN TO DATE: 8.01
RAD ONC ARIA REFERENCE POINT ID: NORMAL
RAD ONC ARIA REFERENCE POINT ID: NORMAL
RAD ONC ARIA REFERENCE POINT SESSION DOSAGE GIVEN: 2.67
RAD ONC ARIA REFERENCE POINT SESSION DOSAGE GIVEN: 2.67

## 2021-06-18 PROCEDURE — 77336 RADIATION PHYSICS CONSULT: CPT | Performed by: RADIOLOGY

## 2021-06-18 PROCEDURE — 77412 RADIATION TX DELIVERY LVL 3: CPT | Performed by: RADIOLOGY

## 2021-06-21 ENCOUNTER — HOSPITAL ENCOUNTER (OUTPATIENT)
Dept: RADIATION ONCOLOGY | Facility: MEDICAL CENTER | Age: 56
End: 2021-06-21
Payer: COMMERCIAL

## 2021-06-21 LAB
CHEMOTHERAPY INFUSION START DATE: NORMAL
CHEMOTHERAPY RECORDS: 2.67
CHEMOTHERAPY RECORDS: 4005
CHEMOTHERAPY RECORDS: NORMAL
CHEMOTHERAPY RX CANCER: NORMAL
DATE 1ST CHEMO CANCER: NORMAL
RAD ONC ARIA COURSE LAST TREATMENT DATE: NORMAL
RAD ONC ARIA COURSE TREATMENT ELAPSED DAYS: NORMAL
RAD ONC ARIA REFERENCE POINT DOSAGE GIVEN TO DATE: 10.68
RAD ONC ARIA REFERENCE POINT DOSAGE GIVEN TO DATE: 10.68
RAD ONC ARIA REFERENCE POINT ID: NORMAL
RAD ONC ARIA REFERENCE POINT ID: NORMAL
RAD ONC ARIA REFERENCE POINT SESSION DOSAGE GIVEN: 2.67
RAD ONC ARIA REFERENCE POINT SESSION DOSAGE GIVEN: 2.67

## 2021-06-21 PROCEDURE — 77412 RADIATION TX DELIVERY LVL 3: CPT | Performed by: RADIOLOGY

## 2021-06-22 ENCOUNTER — HOSPITAL ENCOUNTER (OUTPATIENT)
Dept: RADIATION ONCOLOGY | Facility: MEDICAL CENTER | Age: 56
End: 2021-06-22
Payer: COMMERCIAL

## 2021-06-22 LAB
CHEMOTHERAPY INFUSION START DATE: NORMAL
CHEMOTHERAPY RECORDS: 2.67
CHEMOTHERAPY RECORDS: 4005
CHEMOTHERAPY RECORDS: NORMAL
CHEMOTHERAPY RX CANCER: NORMAL
DATE 1ST CHEMO CANCER: NORMAL
RAD ONC ARIA COURSE LAST TREATMENT DATE: NORMAL
RAD ONC ARIA COURSE TREATMENT ELAPSED DAYS: NORMAL
RAD ONC ARIA REFERENCE POINT DOSAGE GIVEN TO DATE: 13.35
RAD ONC ARIA REFERENCE POINT DOSAGE GIVEN TO DATE: 13.35
RAD ONC ARIA REFERENCE POINT ID: NORMAL
RAD ONC ARIA REFERENCE POINT ID: NORMAL
RAD ONC ARIA REFERENCE POINT SESSION DOSAGE GIVEN: 2.67
RAD ONC ARIA REFERENCE POINT SESSION DOSAGE GIVEN: 2.67

## 2021-06-22 PROCEDURE — 77427 RADIATION TX MANAGEMENT X5: CPT | Performed by: RADIOLOGY

## 2021-06-22 PROCEDURE — 77412 RADIATION TX DELIVERY LVL 3: CPT | Performed by: RADIOLOGY

## 2021-06-23 ENCOUNTER — HOSPITAL ENCOUNTER (OUTPATIENT)
Dept: RADIATION ONCOLOGY | Facility: MEDICAL CENTER | Age: 56
End: 2021-06-23
Payer: COMMERCIAL

## 2021-06-23 VITALS
SYSTOLIC BLOOD PRESSURE: 153 MMHG | DIASTOLIC BLOOD PRESSURE: 85 MMHG | TEMPERATURE: 97.4 F | OXYGEN SATURATION: 95 % | HEART RATE: 84 BPM | WEIGHT: 234 LBS | BODY MASS INDEX: 33.58 KG/M2

## 2021-06-23 LAB
CHEMOTHERAPY INFUSION START DATE: NORMAL
CHEMOTHERAPY RECORDS: 2.67
CHEMOTHERAPY RECORDS: 4005
CHEMOTHERAPY RECORDS: NORMAL
CHEMOTHERAPY RX CANCER: NORMAL
DATE 1ST CHEMO CANCER: NORMAL
RAD ONC ARIA COURSE LAST TREATMENT DATE: NORMAL
RAD ONC ARIA COURSE TREATMENT ELAPSED DAYS: NORMAL
RAD ONC ARIA REFERENCE POINT DOSAGE GIVEN TO DATE: 16.02
RAD ONC ARIA REFERENCE POINT DOSAGE GIVEN TO DATE: 16.02
RAD ONC ARIA REFERENCE POINT ID: NORMAL
RAD ONC ARIA REFERENCE POINT ID: NORMAL
RAD ONC ARIA REFERENCE POINT SESSION DOSAGE GIVEN: 2.67
RAD ONC ARIA REFERENCE POINT SESSION DOSAGE GIVEN: 2.67

## 2021-06-23 PROCEDURE — 77417 THER RADIOLOGY PORT IMAGE(S): CPT | Performed by: RADIOLOGY

## 2021-06-23 PROCEDURE — 77412 RADIATION TX DELIVERY LVL 3: CPT | Performed by: RADIOLOGY

## 2021-06-23 ASSESSMENT — PAIN SCALES - GENERAL: PAINLEVEL: NO PAIN

## 2021-06-23 NOTE — ON TREATMENT VISIT
ON TREATMENT NOTE  RADIATION ONCOLOGY DEPARTMENT    Patient name:  Laura Cano    Primary Physician:  Daly Haji D.O. MRN: [id number]  CSN: [id number]   Referring physician:  Akilah Mariee M.D. : 1965, 55 y.o.     ENCOUNTER DATE:  21    DIAGNOSIS:    Infiltrating ductal carcinoma of right breast (HCC)  Staging form: Breast, AJCC 8th Edition  - Pathologic: Stage IA (pT1b, pN0(sn), cM0, G2, ER+, [place]-, HER2-) - Signed by Maggie Carty M.D. on 2021  Stage prefix: Initial diagnosis  Method of lymph node assessment: [place] lymph node biopsy  Histologic grading system: 3 grade system      TREATMENT SUMMARY:  Radiation Treatments     Active   Plans   R_Breast   Most recent treatment: Dose planned: 267 cGy (fraction 6 of 15 on 2021)   Total: Dose planned: 4,005 cGy   Elapsed Days: 7 @       Reference Points   R_Breast   Most recent treatment: Dose given: 267 cGy (on 2021)   Total: Dose given: 1,602 cGy   Elapsed Days: 7 @       R_Breast CP   Most recent treatment: Dose given: 267 cGy (on 2021)   Total: Dose given: 1,602 cGy   Elapsed Days: 7 @                     SUBJECTIVE:   Doing well no complaints      VITAL SIGNS:  /85   Pulse 84   Temp 36.3 °C (97.4 °F)   Wt 106 kg (234 lb)   SpO2 95%    Encounter Vitals  Temperature: 36.3 °C (97.4 °F)  Blood Pressure: 153/85  Pulse: 84  Pulse Oximetry: 95 %  Weight: 106 kg (234 lb)  Pain Score: No pain  Pain Assessment 2021   Pain Assessment - Denies Pain Acute Pain -   Pain Score 0 0 5 5   Pain Loc - - Breast -   What increases pain? - - patient reports she has pain due to lymphadema in rt upper arm /rt axilla. Pt has appt w/ lymphadema clinic 21 -   What decreases pain? - - nothing -   Some recent data might be hidden          PHYSICAL EXAM:    No erythema    Toxicity Assessment 2021   Toxicity Assessment Breast Breast    Fatigue (lethargy, malaise, asthenia) Increased fatigue over baseline, but not altering normal activities None   Fever (in the absence of neutropenia) None None   Radiation Dermatitis None None   Lymphatics Normal Normal   RT - Pain due to RT None None   Dyspnea Normal Normal         IMPRESSION:  Cancer Staging  Infiltrating ductal carcinoma of right breast (HCC)  Staging form: Breast, AJCC 8th Edition  - Pathologic: Stage IA (pT1b, pN0(sn), cM0, G2, ER+, OH-, HER2-) - Signed by Maggie Carty M.D. on 6/7/2021      PLAN:  No change in treatment plan    Disposition:  Treatment plan reviewed. Questions answered. Continue therapy outlined.     Maggie Carty M.D.    No orders of the defined types were placed in this encounter.

## 2021-06-24 ENCOUNTER — HOSPITAL ENCOUNTER (OUTPATIENT)
Dept: RADIATION ONCOLOGY | Facility: MEDICAL CENTER | Age: 56
End: 2021-06-24
Payer: COMMERCIAL

## 2021-06-24 LAB
CHEMOTHERAPY INFUSION START DATE: NORMAL
CHEMOTHERAPY RECORDS: 2.67
CHEMOTHERAPY RECORDS: 4005
CHEMOTHERAPY RECORDS: NORMAL
CHEMOTHERAPY RX CANCER: NORMAL
DATE 1ST CHEMO CANCER: NORMAL
RAD ONC ARIA COURSE LAST TREATMENT DATE: NORMAL
RAD ONC ARIA COURSE TREATMENT ELAPSED DAYS: NORMAL
RAD ONC ARIA REFERENCE POINT DOSAGE GIVEN TO DATE: 18.69
RAD ONC ARIA REFERENCE POINT DOSAGE GIVEN TO DATE: 18.69
RAD ONC ARIA REFERENCE POINT ID: NORMAL
RAD ONC ARIA REFERENCE POINT ID: NORMAL
RAD ONC ARIA REFERENCE POINT SESSION DOSAGE GIVEN: 2.67
RAD ONC ARIA REFERENCE POINT SESSION DOSAGE GIVEN: 2.67

## 2021-06-24 PROCEDURE — 77412 RADIATION TX DELIVERY LVL 3: CPT | Performed by: RADIOLOGY

## 2021-06-25 ENCOUNTER — HOSPITAL ENCOUNTER (OUTPATIENT)
Dept: RADIATION ONCOLOGY | Facility: MEDICAL CENTER | Age: 56
End: 2021-06-25
Payer: COMMERCIAL

## 2021-06-25 LAB
CHEMOTHERAPY INFUSION START DATE: NORMAL
CHEMOTHERAPY RECORDS: 2.67
CHEMOTHERAPY RECORDS: 4005
CHEMOTHERAPY RECORDS: NORMAL
CHEMOTHERAPY RX CANCER: NORMAL
DATE 1ST CHEMO CANCER: NORMAL
RAD ONC ARIA COURSE LAST TREATMENT DATE: NORMAL
RAD ONC ARIA COURSE TREATMENT ELAPSED DAYS: NORMAL
RAD ONC ARIA REFERENCE POINT DOSAGE GIVEN TO DATE: 21.36
RAD ONC ARIA REFERENCE POINT DOSAGE GIVEN TO DATE: 21.36
RAD ONC ARIA REFERENCE POINT ID: NORMAL
RAD ONC ARIA REFERENCE POINT ID: NORMAL
RAD ONC ARIA REFERENCE POINT SESSION DOSAGE GIVEN: 2.67
RAD ONC ARIA REFERENCE POINT SESSION DOSAGE GIVEN: 2.67

## 2021-06-25 PROCEDURE — 77412 RADIATION TX DELIVERY LVL 3: CPT | Performed by: RADIOLOGY

## 2021-06-25 PROCEDURE — 77336 RADIATION PHYSICS CONSULT: CPT | Performed by: RADIOLOGY

## 2021-06-28 ENCOUNTER — HOSPITAL ENCOUNTER (OUTPATIENT)
Dept: RADIATION ONCOLOGY | Facility: MEDICAL CENTER | Age: 56
End: 2021-06-28
Payer: COMMERCIAL

## 2021-06-28 LAB
CHEMOTHERAPY INFUSION START DATE: NORMAL
CHEMOTHERAPY RECORDS: 2.67
CHEMOTHERAPY RECORDS: 4005
CHEMOTHERAPY RECORDS: NORMAL
CHEMOTHERAPY RX CANCER: NORMAL
DATE 1ST CHEMO CANCER: NORMAL
RAD ONC ARIA COURSE LAST TREATMENT DATE: NORMAL
RAD ONC ARIA COURSE TREATMENT ELAPSED DAYS: NORMAL
RAD ONC ARIA REFERENCE POINT DOSAGE GIVEN TO DATE: 24.03
RAD ONC ARIA REFERENCE POINT DOSAGE GIVEN TO DATE: 24.03
RAD ONC ARIA REFERENCE POINT ID: NORMAL
RAD ONC ARIA REFERENCE POINT ID: NORMAL
RAD ONC ARIA REFERENCE POINT SESSION DOSAGE GIVEN: 2.67
RAD ONC ARIA REFERENCE POINT SESSION DOSAGE GIVEN: 2.67

## 2021-06-28 PROCEDURE — 77412 RADIATION TX DELIVERY LVL 3: CPT | Performed by: RADIOLOGY

## 2021-06-29 ENCOUNTER — HOSPITAL ENCOUNTER (OUTPATIENT)
Dept: RADIATION ONCOLOGY | Facility: MEDICAL CENTER | Age: 56
End: 2021-06-29
Payer: COMMERCIAL

## 2021-06-29 LAB
CHEMOTHERAPY INFUSION START DATE: NORMAL
CHEMOTHERAPY RECORDS: 2.67
CHEMOTHERAPY RECORDS: 4005
CHEMOTHERAPY RECORDS: NORMAL
CHEMOTHERAPY RX CANCER: NORMAL
DATE 1ST CHEMO CANCER: NORMAL
RAD ONC ARIA COURSE LAST TREATMENT DATE: NORMAL
RAD ONC ARIA COURSE TREATMENT ELAPSED DAYS: NORMAL
RAD ONC ARIA REFERENCE POINT DOSAGE GIVEN TO DATE: 26.7
RAD ONC ARIA REFERENCE POINT DOSAGE GIVEN TO DATE: 26.7
RAD ONC ARIA REFERENCE POINT ID: NORMAL
RAD ONC ARIA REFERENCE POINT ID: NORMAL
RAD ONC ARIA REFERENCE POINT SESSION DOSAGE GIVEN: 2.67
RAD ONC ARIA REFERENCE POINT SESSION DOSAGE GIVEN: 2.67

## 2021-06-29 PROCEDURE — 77427 RADIATION TX MANAGEMENT X5: CPT | Performed by: RADIOLOGY

## 2021-06-29 PROCEDURE — 77412 RADIATION TX DELIVERY LVL 3: CPT | Performed by: RADIOLOGY

## 2021-06-30 ENCOUNTER — HOSPITAL ENCOUNTER (OUTPATIENT)
Dept: RADIATION ONCOLOGY | Facility: MEDICAL CENTER | Age: 56
End: 2021-06-30
Payer: COMMERCIAL

## 2021-06-30 VITALS
WEIGHT: 231 LBS | BODY MASS INDEX: 33.15 KG/M2 | DIASTOLIC BLOOD PRESSURE: 90 MMHG | SYSTOLIC BLOOD PRESSURE: 176 MMHG | HEART RATE: 91 BPM | TEMPERATURE: 98.6 F | OXYGEN SATURATION: 97 %

## 2021-06-30 LAB
CHEMOTHERAPY INFUSION START DATE: NORMAL
CHEMOTHERAPY RECORDS: 2.67
CHEMOTHERAPY RECORDS: 4005
CHEMOTHERAPY RECORDS: NORMAL
CHEMOTHERAPY RX CANCER: NORMAL
DATE 1ST CHEMO CANCER: NORMAL
RAD ONC ARIA COURSE LAST TREATMENT DATE: NORMAL
RAD ONC ARIA COURSE TREATMENT ELAPSED DAYS: NORMAL
RAD ONC ARIA REFERENCE POINT DOSAGE GIVEN TO DATE: 29.37
RAD ONC ARIA REFERENCE POINT DOSAGE GIVEN TO DATE: 29.37
RAD ONC ARIA REFERENCE POINT ID: NORMAL
RAD ONC ARIA REFERENCE POINT ID: NORMAL
RAD ONC ARIA REFERENCE POINT SESSION DOSAGE GIVEN: 2.67
RAD ONC ARIA REFERENCE POINT SESSION DOSAGE GIVEN: 2.67

## 2021-06-30 PROCEDURE — 77412 RADIATION TX DELIVERY LVL 3: CPT | Performed by: RADIOLOGY

## 2021-06-30 PROCEDURE — 77417 THER RADIOLOGY PORT IMAGE(S): CPT | Performed by: RADIOLOGY

## 2021-06-30 ASSESSMENT — PAIN SCALES - GENERAL: PAINLEVEL: NO PAIN

## 2021-06-30 NOTE — ON TREATMENT VISIT
ON TREATMENT NOTE  RADIATION ONCOLOGY DEPARTMENT    Patient name:  Laura Cano    Primary Physician:  Daly Haji D.O. MRN: [id number]  CSN: [id number]   Referring physician:  Akilah Mariee M.D. : 1965, 55 y.o.     ENCOUNTER DATE:  21    DIAGNOSIS:    Infiltrating ductal carcinoma of right breast (HCC)  Staging form: Breast, AJCC 8th Edition  - Pathologic: Stage IA (pT1b, pN0(sn), cM0, G2, ER+, [place]-, HER2-) - Signed by Maggie Carty M.D. on 2021  Stage prefix: Initial diagnosis  Method of lymph node assessment: [place] lymph node biopsy  Histologic grading system: 3 grade system      TREATMENT SUMMARY:  Radiation Treatments     Active   Plans   R_Breast   Most recent treatment: Dose planned: 267 cGy (fraction 11 of 15 on 2021)   Total: Dose planned: 4,005 cGy   Elapsed Days: 14 @ [id number]      Reference Points   R_Breast   Most recent treatment: Dose given: 267 cGy (on 2021)   Total: Dose given: 2,937 cGy   Elapsed Days: 14 @ [id number]      R_Breast CP   Most recent treatment: Dose given: 267 cGy (on 2021)   Total: Dose given: 2,937 cGy   Elapsed Days: 14 @ [id number]                    SUBJECTIVE:   I doing well from a radiation standpoint no discomfort      VITAL SIGNS:  BP (!) 176/90 (BP Location: Left arm, Patient Position: Sitting, BP Cuff Size: Adult)   Pulse 91   Temp 37 °C (98.6 °F)   Wt 105 kg (231 lb)   SpO2 97%    Encounter Vitals  Temperature: 37 °C (98.6 °F)  Blood Pressure: (!) 176/90  Pulse: 91  Pulse Oximetry: 97 %  Weight: 105 kg (231 lb)  Pain Score: No pain  Pain Assessment 2021   Pain Assessment - - Denies Pain Acute Pain -   Pain Score 0 0 0 5 5   Pain Loc - - - Breast -   What increases pain? - - - patient reports she has pain due to lymphadema in rt upper arm /rt axilla. Pt has appt w/ lymphadema clinic 21 -   What decreases pain? - - - nothing -   Some recent data might be  hidden          PHYSICAL EXAM:    No erythema    Toxicity Assessment 6/30/2021 6/23/2021 6/16/2021   Toxicity Assessment Breast Breast Breast   Fatigue (lethargy, malaise, asthenia) Increased fatigue over baseline, but not altering normal activities Increased fatigue over baseline, but not altering normal activities None   Fever (in the absence of neutropenia) None None None   Radiation Dermatitis Faint erythema or dry desquamation None None   Lymphatics Normal Normal Normal   RT - Pain due to RT None None None   Dyspnea Normal Normal Normal         IMPRESSION:  Cancer Staging  Infiltrating ductal carcinoma of right breast (HCC)  Staging form: Breast, AJCC 8th Edition  - Pathologic: Stage IA (pT1b, pN0(sn), cM0, G2, ER+, [place]-, HER2-) - Signed by Maggie Carty M.D. on 6/7/2021      PLAN:  No change in treatment plan    Disposition:  Treatment plan reviewed. Questions answered. Continue therapy outlined.     Maggie Carty M.D.    No orders of the defined types were placed in this encounter.

## 2021-07-01 ENCOUNTER — HOSPITAL ENCOUNTER (OUTPATIENT)
Dept: RADIATION ONCOLOGY | Facility: MEDICAL CENTER | Age: 56
End: 2021-07-31
Attending: RADIOLOGY
Payer: COMMERCIAL

## 2021-07-01 ENCOUNTER — HOSPITAL ENCOUNTER (OUTPATIENT)
Dept: RADIATION ONCOLOGY | Facility: MEDICAL CENTER | Age: 56
End: 2021-07-01
Payer: COMMERCIAL

## 2021-07-01 LAB
CHEMOTHERAPY INFUSION START DATE: NORMAL
CHEMOTHERAPY RECORDS: 2.67
CHEMOTHERAPY RECORDS: 4005
CHEMOTHERAPY RECORDS: NORMAL
CHEMOTHERAPY RX CANCER: NORMAL
DATE 1ST CHEMO CANCER: NORMAL
RAD ONC ARIA COURSE LAST TREATMENT DATE: NORMAL
RAD ONC ARIA COURSE TREATMENT ELAPSED DAYS: NORMAL
RAD ONC ARIA REFERENCE POINT DOSAGE GIVEN TO DATE: 32.04
RAD ONC ARIA REFERENCE POINT DOSAGE GIVEN TO DATE: 32.04
RAD ONC ARIA REFERENCE POINT ID: NORMAL
RAD ONC ARIA REFERENCE POINT ID: NORMAL
RAD ONC ARIA REFERENCE POINT SESSION DOSAGE GIVEN: 2.67
RAD ONC ARIA REFERENCE POINT SESSION DOSAGE GIVEN: 2.67

## 2021-07-01 PROCEDURE — 77412 RADIATION TX DELIVERY LVL 3: CPT | Performed by: RADIOLOGY

## 2021-07-02 ENCOUNTER — HOSPITAL ENCOUNTER (OUTPATIENT)
Dept: RADIATION ONCOLOGY | Facility: MEDICAL CENTER | Age: 56
End: 2021-07-02
Payer: COMMERCIAL

## 2021-07-02 LAB
CHEMOTHERAPY INFUSION START DATE: NORMAL
CHEMOTHERAPY RECORDS: 2.67
CHEMOTHERAPY RECORDS: 4005
CHEMOTHERAPY RECORDS: NORMAL
CHEMOTHERAPY RX CANCER: NORMAL
DATE 1ST CHEMO CANCER: NORMAL
RAD ONC ARIA COURSE LAST TREATMENT DATE: NORMAL
RAD ONC ARIA COURSE TREATMENT ELAPSED DAYS: NORMAL
RAD ONC ARIA REFERENCE POINT DOSAGE GIVEN TO DATE: 34.71
RAD ONC ARIA REFERENCE POINT DOSAGE GIVEN TO DATE: 34.71
RAD ONC ARIA REFERENCE POINT ID: NORMAL
RAD ONC ARIA REFERENCE POINT ID: NORMAL
RAD ONC ARIA REFERENCE POINT SESSION DOSAGE GIVEN: 2.67
RAD ONC ARIA REFERENCE POINT SESSION DOSAGE GIVEN: 2.67

## 2021-07-02 PROCEDURE — 77336 RADIATION PHYSICS CONSULT: CPT | Performed by: RADIOLOGY

## 2021-07-02 PROCEDURE — 77412 RADIATION TX DELIVERY LVL 3: CPT | Performed by: RADIOLOGY

## 2021-07-06 ENCOUNTER — HOSPITAL ENCOUNTER (OUTPATIENT)
Dept: RADIATION ONCOLOGY | Facility: MEDICAL CENTER | Age: 56
End: 2021-07-06
Payer: COMMERCIAL

## 2021-07-06 LAB
CHEMOTHERAPY INFUSION START DATE: NORMAL
CHEMOTHERAPY RECORDS: 2.67
CHEMOTHERAPY RECORDS: 4005
CHEMOTHERAPY RECORDS: NORMAL
CHEMOTHERAPY RX CANCER: NORMAL
DATE 1ST CHEMO CANCER: NORMAL
RAD ONC ARIA COURSE LAST TREATMENT DATE: NORMAL
RAD ONC ARIA COURSE TREATMENT ELAPSED DAYS: NORMAL
RAD ONC ARIA REFERENCE POINT DOSAGE GIVEN TO DATE: 37.38
RAD ONC ARIA REFERENCE POINT DOSAGE GIVEN TO DATE: 37.38
RAD ONC ARIA REFERENCE POINT ID: NORMAL
RAD ONC ARIA REFERENCE POINT ID: NORMAL
RAD ONC ARIA REFERENCE POINT SESSION DOSAGE GIVEN: 2.67
RAD ONC ARIA REFERENCE POINT SESSION DOSAGE GIVEN: 2.67

## 2021-07-06 PROCEDURE — 77412 RADIATION TX DELIVERY LVL 3: CPT | Performed by: RADIOLOGY

## 2021-07-07 ENCOUNTER — HOSPITAL ENCOUNTER (OUTPATIENT)
Dept: RADIATION ONCOLOGY | Facility: MEDICAL CENTER | Age: 56
End: 2021-07-07

## 2021-07-07 VITALS
TEMPERATURE: 99.7 F | OXYGEN SATURATION: 96 % | SYSTOLIC BLOOD PRESSURE: 170 MMHG | HEART RATE: 85 BPM | DIASTOLIC BLOOD PRESSURE: 99 MMHG

## 2021-07-07 LAB
CHEMOTHERAPY INFUSION START DATE: NORMAL
CHEMOTHERAPY RECORDS: 2.67
CHEMOTHERAPY RECORDS: 4005
CHEMOTHERAPY RECORDS: NORMAL
CHEMOTHERAPY RX CANCER: NORMAL
DATE 1ST CHEMO CANCER: NORMAL
RAD ONC ARIA COURSE LAST TREATMENT DATE: NORMAL
RAD ONC ARIA COURSE TREATMENT ELAPSED DAYS: NORMAL
RAD ONC ARIA REFERENCE POINT DOSAGE GIVEN TO DATE: 40.05
RAD ONC ARIA REFERENCE POINT DOSAGE GIVEN TO DATE: 40.05
RAD ONC ARIA REFERENCE POINT ID: NORMAL
RAD ONC ARIA REFERENCE POINT ID: NORMAL
RAD ONC ARIA REFERENCE POINT SESSION DOSAGE GIVEN: 2.67
RAD ONC ARIA REFERENCE POINT SESSION DOSAGE GIVEN: 2.67

## 2021-07-07 PROCEDURE — 77412 RADIATION TX DELIVERY LVL 3: CPT | Performed by: RADIOLOGY

## 2021-07-07 PROCEDURE — 77427 RADIATION TX MANAGEMENT X5: CPT | Performed by: RADIOLOGY

## 2021-07-07 ASSESSMENT — PAIN SCALES - GENERAL: PAINLEVEL: NO PAIN

## 2021-07-07 NOTE — ON TREATMENT VISIT
ON TREATMENT NOTE  RADIATION ONCOLOGY DEPARTMENT    Patient name:  Laura Cano    Primary Physician:  Daly Haji D.O. MRN: [id number]  CSN: [id number]   Referring physician:  Akilah Mariee M.D. : 1965, 55 y.o.     ENCOUNTER DATE:  21    DIAGNOSIS:    Infiltrating ductal carcinoma of right breast (HCC)  Staging form: Breast, AJCC 8th Edition  - Pathologic: Stage IA (pT1b, pN0(sn), cM0, G2, ER+, CA-, HER2-) - Signed by Maggie Carty M.D. on 2021  Stage prefix: Initial diagnosis  Method of lymph node assessment: Grabill lymph node biopsy  Histologic grading system: 3 grade system      TREATMENT SUMMARY:  Radiation Treatments     Active   Plans   R_Breast   Most recent treatment: Dose planned: 267 cGy (fraction 15 of 15 on 2021)   Total: Dose planned: 4,005 cGy   Elapsed Days:  @ [id number]      Reference Points   R_Breast   Most recent treatment: Dose given: 267 cGy (on 2021)   Total: Dose given: 4,005 cGy   Elapsed Days:  @ [id number]      R_Breast CP   Most recent treatment: Dose given: 267 cGy (on 2021)   Total: Dose given: 4,005 cGy   Elapsed Days:  @ [id number]                    SUBJECTIVE:   Doing well tolerated therapy without major event except for some soreness in the inframammary fold responding to THC cream      VITAL SIGNS:  BP (!) 170/99 (BP Location: Left arm, Patient Position: Sitting, BP Cuff Size: Adult)   Pulse 85   Temp 37.6 °C (99.7 °F) (Temporal)   SpO2 96%    Encounter Vitals  Temperature: 37.6 °C (99.7 °F)  Temp src: Temporal  Blood Pressure: (!) 170/99  Pulse: 85  Pulse Oximetry: 96 %  Pain Score: No pain  Pain Assessment 2021   Pain Assessment Denies Pain - - Denies Pain   Pain Score 0 0 0 0   Some recent data might be hidden          PHYSICAL EXAM:  Moist desquamation in the inframammary fold  Mild erythema in the treatment field    Toxicity Assessment 2021  6/16/2021   Toxicity Assessment Breast Breast Breast Breast   Fatigue (lethargy, malaise, asthenia) None Increased fatigue over baseline, but not altering normal activities Increased fatigue over baseline, but not altering normal activities None   Fever (in the absence of neutropenia) None None None None   Radiation Dermatitis Moderate to brisk erythema or a patchy moist desquamation, mostly confined to skin folds and creases, with/without moderate edema Faint erythema or dry desquamation None None   Lymphatics Mild lymphedema Normal Normal Normal   RT - Pain due to RT None None None None   Dyspnea Normal Normal Normal Normal         IMPRESSION:  Cancer Staging  Infiltrating ductal carcinoma of right breast (HCC)  Staging form: Breast, AJCC 8th Edition  - Pathologic: Stage IA (pT1b, pN0(sn), cM0, G2, ER+, AR-, HER2-) - Signed by Maggie Carty M.D. on 6/7/2021      PLAN:  No change in treatment plan    Disposition:  Treatment plan reviewed. Questions answered. Continue therapy outlined.     Maggie Carty M.D.    No orders of the defined types were placed in this encounter.

## 2021-07-08 LAB
CHEMOTHERAPY INFUSION START DATE: NORMAL
CHEMOTHERAPY INFUSION STOP DATE: NORMAL
CHEMOTHERAPY RECORDS: 2.67
CHEMOTHERAPY RECORDS: 4005
CHEMOTHERAPY RECORDS: NORMAL
CHEMOTHERAPY RX CANCER: NORMAL
DATE 1ST CHEMO CANCER: NORMAL
RAD ONC ARIA COURSE LAST TREATMENT DATE: NORMAL
RAD ONC ARIA COURSE TREATMENT ELAPSED DAYS: NORMAL
RAD ONC ARIA REFERENCE POINT DOSAGE GIVEN TO DATE: 40.05
RAD ONC ARIA REFERENCE POINT DOSAGE GIVEN TO DATE: 40.05
RAD ONC ARIA REFERENCE POINT ID: NORMAL
RAD ONC ARIA REFERENCE POINT ID: NORMAL

## 2021-07-27 ENCOUNTER — HOSPITAL ENCOUNTER (OUTPATIENT)
Dept: LAB | Facility: MEDICAL CENTER | Age: 56
End: 2021-07-27
Attending: INTERNAL MEDICINE
Payer: COMMERCIAL

## 2021-07-27 PROCEDURE — 82670 ASSAY OF TOTAL ESTRADIOL: CPT

## 2021-07-27 PROCEDURE — 83001 ASSAY OF GONADOTROPIN (FSH): CPT

## 2021-07-27 PROCEDURE — 36415 COLL VENOUS BLD VENIPUNCTURE: CPT

## 2021-07-28 LAB — FSH SERPL-ACNC: 50.1 MIU/ML

## 2021-07-29 LAB — ESTRADIOL SERPL-MCNC: 23 PG/ML

## 2021-08-15 ENCOUNTER — PATIENT MESSAGE (OUTPATIENT)
Dept: MEDICAL GROUP | Facility: MEDICAL CENTER | Age: 56
End: 2021-08-15

## 2021-08-24 ENCOUNTER — HOSPITAL ENCOUNTER (OUTPATIENT)
Dept: RADIATION ONCOLOGY | Facility: MEDICAL CENTER | Age: 56
End: 2021-08-31
Attending: RADIOLOGY
Payer: COMMERCIAL

## 2021-08-24 VITALS
OXYGEN SATURATION: 96 % | WEIGHT: 234.35 LBS | TEMPERATURE: 98.8 F | SYSTOLIC BLOOD PRESSURE: 174 MMHG | HEIGHT: 70 IN | HEART RATE: 72 BPM | BODY MASS INDEX: 33.55 KG/M2 | DIASTOLIC BLOOD PRESSURE: 87 MMHG

## 2021-08-24 DIAGNOSIS — C50.411 MALIGNANT NEOPLASM OF UPPER-OUTER QUADRANT OF RIGHT BREAST IN FEMALE, ESTROGEN RECEPTOR POSITIVE (HCC): ICD-10-CM

## 2021-08-24 DIAGNOSIS — Z17.0 MALIGNANT NEOPLASM OF UPPER-OUTER QUADRANT OF RIGHT BREAST IN FEMALE, ESTROGEN RECEPTOR POSITIVE (HCC): ICD-10-CM

## 2021-08-24 PROCEDURE — 99212 OFFICE O/P EST SF 10 MIN: CPT | Performed by: RADIOLOGY

## 2021-08-24 ASSESSMENT — PAIN SCALES - GENERAL: PAINLEVEL: NO PAIN

## 2021-08-24 NOTE — PROGRESS NOTES
RADIATION ONCOLOGY FOLLOW-UP    DATE OF SERVICE: 8/24/2021    IDENTIFICATION:   A 55 y.o. female with a pT1bN0 Grade 1 ER 60 CO less than 1 Ki-67 less than 3 HER-2 negative infiltrating ductal carcinoma of the right upper outer quadrant of the breast status post lumpectomy and sentinel node dissection with negative margins and negative nodes. Status post radiation therapy complete 7/7/2021.       HISTORY OF PRESENT ILLNESS:   Since last seen the moist desquamation that she developed in the inframammary fold has resolved.  She has not started tamoxifen yet as Dr. Mariee is going to be getting a hormone panel to see  if she is a candidate for an aromatase inhibitor.  Just recently she felt she noted a lump in her right breast at the 6 o'clock position.    CURRENT MEDICATIONS:  Current Outpatient Medications   Medication Sig Dispense Refill   • ASPIRIN 81 PO Take  by mouth.     • lisinopril (PRINIVIL) 20 MG Tab Take 1 tablet by mouth every day. To replace lisinopril 10mg daily. 90 tablet 3   • HYDROcodone-acetaminophen (NORCO) 5-325 MG Tab per tablet Take 1 tablet by mouth every four hours as needed. (Patient not taking: Reported on 8/24/2021)       No current facility-administered medications for this encounter.       ALLERGIES:  Sulfa drugs    FAMILY HISTORY:    Family History   Problem Relation Age of Onset   • Lung Disease Father         emphysema   • Heart Disease Father         d. MI age 67   • Hypertension Father    • Lung Disease Mother         emphysema   • Osteoporosis Mother    • Diabetes Brother    [unfilled]        SOCIAL HISTORY:     reports that she has never smoked. She has never used smokeless tobacco. She reports current alcohol use. She reports that she does not use drugs.        REVIEW OF SYSTEMS: Is significant for A mass in the right breast at the 6 o'clock position, fatigue, nocturia memory loss  The rest of the review of systems has been reviewed.    PHYSICAL EXAM:     ECOG PERFORMANCE  "STATUS:  0= Fully active, able to carry on all pre-disease performance without restriction.   Vitals:    08/24/21 1456   BP: (!) 174/87   BP Location: Left arm   Patient Position: Sitting   BP Cuff Size: Adult   Pulse: 72   Temp: 37.1 °C (98.8 °F)   TempSrc: Temporal   SpO2: 96%   Weight: 106 kg (234 lb 5.6 oz)   Height: 1.778 m (5' 10\")   Pain Score: No pain        GENERAL: Alert oriented x3 somewhat anxious about this abnormality in the inferior aspect of her right breast.  Breasts: Right breast has hyperpigmentation consistent with radiation change there is new skin formation in the inframammary fold.  On examination of the right breast she does have villoglandular changes in the inframammary fold and associated lymphedema.  There is more of a prominent spot at about the 6 o'clock position when she is lying flat but this disappears as she puts her arms down.  I really appreciate no discrete mass in either breast or axilla.  HEENT:  Pupils are equal, round, and reactive to light.  Extraocular muscles   are intact. Sclerae nonicteric.  Conjunctivae pink.  Oral cavity, tongue   protrudes midline.   NECK:  No preauricular neck supraclavicular axillary adenopathy.  EXTREMITIES:  Without Edema.  NEUROLOGIC:  Cranial nerves II through XII were intact. Normal stance and gait motor and sensory grossly within normal limits          IMPRESSION:    A 55 y.o. female with a pT1bN0 Grade 1 ER 60 OK less than 1 Ki-67 less than 3 HER-2 negative infiltrating ductal carcinoma of the right upper outer quadrant of the breast status post lumpectomy and sentinel node dissection with negative margins and negative nodes. Status post radiation therapy complete 7/7/2021.     RECOMMENDATIONS:     I ordered an ultrasound of the right breast to evaluate the inframammary area.  I explained to the patient and her  I am not concerned about this I think is post radiation change but for reassurance purposes I ordered the ultrasound.  I will " call her with the report on Monday.  Otherwise she is going to continue her follow-up based on national cancer guidelines with Dr. Akilah Benedict I am happy to see her on an as-needed basis.      Thank you for the opportunity to participate in her care.  If any questions or comments, please do not hesitate in calling.      Please note that this dictation was created using voice recognition software. I have made every reasonable attempt to correct obvious errors, but I expect that there are errors of grammar and possibly content that I did not discover before finalizing the note.

## 2021-08-24 NOTE — NON-PROVIDER
"Patient was seen today in clinic with Dr. Carty for follow up.  Vitals signs and weight were obtained and pain assessment was completed.  Allergies and medications were reviewed with the patient.  Toxicities of treatment assessed.     Vitals/Pain:  Vitals:    08/24/21 1456   BP: (!) 174/87   BP Location: Left arm   Patient Position: Sitting   BP Cuff Size: Adult   Pulse: 72   Temp: 37.1 °C (98.8 °F)   TempSrc: Temporal   SpO2: 96%   Weight: 106 kg (234 lb 5.6 oz)   Height: 1.778 m (5' 10\")   Pain Score: No pain        Allergies:   Sulfa drugs    Current Medications:  Current Outpatient Medications   Medication Sig Dispense Refill   • ASPIRIN 81 PO Take  by mouth.     • lisinopril (PRINIVIL) 20 MG Tab Take 1 tablet by mouth every day. To replace lisinopril 10mg daily. 90 tablet 3   • HYDROcodone-acetaminophen (NORCO) 5-325 MG Tab per tablet Take 1 tablet by mouth every four hours as needed. (Patient not taking: Reported on 8/24/2021)       No current facility-administered medications for this encounter.           Akiko Rosa, Med Ass't    "

## 2021-08-25 ENCOUNTER — HOSPITAL ENCOUNTER (OUTPATIENT)
Dept: RADIOLOGY | Facility: MEDICAL CENTER | Age: 56
End: 2021-08-25
Attending: RADIOLOGY
Payer: COMMERCIAL

## 2021-08-25 DIAGNOSIS — C50.411 MALIGNANT NEOPLASM OF UPPER-OUTER QUADRANT OF RIGHT BREAST IN FEMALE, ESTROGEN RECEPTOR POSITIVE (HCC): ICD-10-CM

## 2021-08-25 DIAGNOSIS — Z17.0 MALIGNANT NEOPLASM OF UPPER-OUTER QUADRANT OF RIGHT BREAST IN FEMALE, ESTROGEN RECEPTOR POSITIVE (HCC): ICD-10-CM

## 2021-08-25 PROCEDURE — 76642 ULTRASOUND BREAST LIMITED: CPT | Mod: RT

## 2022-04-05 DIAGNOSIS — I10 ESSENTIAL HYPERTENSION: ICD-10-CM

## 2022-04-06 RX ORDER — LISINOPRIL 20 MG/1
TABLET ORAL
Qty: 90 TABLET | Refills: 0 | Status: SHIPPED | OUTPATIENT
Start: 2022-04-06 | End: 2022-06-22 | Stop reason: SDUPTHER

## 2022-05-09 ENCOUNTER — PATIENT MESSAGE (OUTPATIENT)
Dept: MEDICAL GROUP | Facility: MEDICAL CENTER | Age: 57
End: 2022-05-09
Payer: COMMERCIAL

## 2022-05-10 ENCOUNTER — HOSPITAL ENCOUNTER (OUTPATIENT)
Dept: RADIOLOGY | Facility: MEDICAL CENTER | Age: 57
End: 2022-05-10
Attending: FAMILY MEDICINE
Payer: COMMERCIAL

## 2022-05-10 DIAGNOSIS — Z12.31 VISIT FOR SCREENING MAMMOGRAM: ICD-10-CM

## 2022-05-10 PROCEDURE — 77063 BREAST TOMOSYNTHESIS BI: CPT

## 2022-05-11 ENCOUNTER — TELEMEDICINE (OUTPATIENT)
Dept: MEDICAL GROUP | Facility: MEDICAL CENTER | Age: 57
End: 2022-05-11
Payer: COMMERCIAL

## 2022-05-11 VITALS — BODY MASS INDEX: 35.07 KG/M2 | HEART RATE: 92 BPM | HEIGHT: 70 IN | WEIGHT: 245 LBS

## 2022-05-11 DIAGNOSIS — I10 PRIMARY HYPERTENSION: ICD-10-CM

## 2022-05-11 DIAGNOSIS — C50.911 INFILTRATING DUCTAL CARCINOMA OF RIGHT BREAST (HCC): ICD-10-CM

## 2022-05-11 DIAGNOSIS — M25.559 HIP PAIN: ICD-10-CM

## 2022-05-11 DIAGNOSIS — Z00.00 HEALTHCARE MAINTENANCE: ICD-10-CM

## 2022-05-11 DIAGNOSIS — E66.9 OBESITY (BMI 30-39.9): ICD-10-CM

## 2022-05-11 PROCEDURE — 99214 OFFICE O/P EST MOD 30 MIN: CPT | Mod: 95 | Performed by: FAMILY MEDICINE

## 2022-05-11 RX ORDER — ANASTROZOLE 1 MG/1
1 TABLET ORAL DAILY
COMMUNITY
Start: 2021-09-01 | End: 2023-03-28

## 2022-05-11 NOTE — PROGRESS NOTES
Virtual Visit: Established Patient   This visit was conducted via Zoom using secure and encrypted videoconferencing technology.   The patient was in their home in the [facility].    The patient's identity was confirmed and verbal consent was obtained for this virtual visit.     Subjective:   CC:   Chief Complaint   Patient presents with   • Referral Needed     Weight loss surgery Dr.Sasse Sanchez Lexus Cano is a 56 y.o. female presenting for evaluation and management of:    Obesity-  She has tried intermittent fasting which provides temporary improvement in weight loss only.  She is not regularly exercising and endorse overeating foods with poor nutritional benefits as a result of feeling grief re: her cancer diagnosis.  She would like to see Dr. Cano for weight loss management and has an appointment scheduled for 5/31/22.    HTN- she is taking her lisinopril daily.  Home Bps less than 140/90s.  No chest pain, palpitations, SOB, headaches or dizziness.    Has pap scheduled for July 2022 with gynecology.    States she is not feeling depressed but is grieving her pre-breast cancer health.  She is seeing a therapist and declines further treatment regarding her mood at this time.    Right hip pain- deep, dull pain of center of hip joint x 4 months.  Symptoms are stable.  Worsens at night when she lies on that side. No improvement with movement.  No weakness or numbness.    Infiltrating ductal carcinoma of right breast- patient is taking anastrozole as prescribed by oncology and has done radiation and surgery.  She has not followed up with her surgeon since 7/15/21.  They recommended repeat diagnostic right breast mammogram to be done 11/2021.  She recently self scheduled a screening mammogram of both breasts instead.  Report no breast lumps, skin changes, nipple discharge.    ROS   Per HPI    Current medicines (including changes today)  Current Outpatient Medications   Medication Sig Dispense Refill   •  "anastrozole (ARIMIDEX) 1 MG Tab      • lisinopril (PRINIVIL) 20 MG Tab TAKE ONE TABLET BY MOUTH ONE TIME DAILY TO REPLACE 10MG TABS 90 Tablet 0     No current facility-administered medications for this visit.       Patient Active Problem List    Diagnosis Date Noted   • Malignant neoplasm of upper-outer quadrant of right breast in female, estrogen receptor positive (HCC) 06/08/2021   • Infiltrating ductal carcinoma of right breast (HCC) 05/05/2021   • Other headache syndrome 03/02/2021   • Fluid level behind tympanic membrane of right ear 03/02/2021   • Family history of osteoporosis 02/24/2021   • Obesity (BMI 30-39.9) 02/24/2021   • Vasovagal near syncope 12/16/2014   • Microcytic anemia 08/22/2012   • HTN (hypertension) 04/16/2012   • Acne 04/16/2012   • Family history of colon cancer 04/16/2012        Objective:   Pulse 92   Ht 1.765 m (5' 9.5\")   Wt 111 kg (245 lb)   LMP  (LMP Unknown) Comment: October 2020  BMI 35.66 kg/m²     Physical Exam:  Constitutional: Alert, no distress, well-groomed.  Skin: No rashes in visible areas.  Eye: Round. Conjunctiva clear, lids normal. No icterus.   ENMT: Lips pink without lesions, good dentition, moist mucous membranes. Phonation normal.  Neck: No masses, no thyromegaly. Moves freely without pain.  Respiratory: Unlabored respiratory effort, no cough or audible wheeze  Psych: Alert and oriented x3, normal affect and mood.   Hip: Full ROM, full strength, TTP none (pt states the pain is deep in the joint)    Assessment and Plan:   The following treatment plan was discussed:     1. Primary hypertension  Chronic, well controlled, asymptomatic.  Continue lisinopril and bariatric surgery referral given.  Will monitor  - Referral to Bariatric Surgery    2. Hip pain  New issue, stable for 4 months.  Will check xray and further treat based on results.  She declines medication at this time.  - DX-HIP-UNILATERAL-WITH PELVIS-1 VIEW RIGHT; Future    3. Infiltrating ductal carcinoma of " right breast (HCC)  Chronic s/p surgery, radiation and now taking anastrozole.  She had a recent screening mammogram that was normal, however I explained that given her history a diagnostic mammogram would be a better imaging choice.  Diagnostic mammogram ordered stat given duration since her last imaging.  Pt has referral to lymphedema clinic but is deferring this at this time since her right arm lymphedema is mild and improves with lifestyle modifications.  Recommended she schedule her follow-up appointments with oncology and Dr. Coulter for continuity of care.  Will monitor  - MA-DIAGNOSTIC MAMMO BILAT W/TOMOSYNTHESIS W/O CAD; Future    4. Obesity (BMI 30-39.9)  Chronic, worsening.  Labs and bariatric surgery referral given.  Dietary and lifestyle modifications discussed.  - Comp Metabolic Panel; Future  - CBC WITH DIFFERENTIAL; Future  - Lipid Profile; Future  - HEMOGLOBIN A1C; Future  - Referral to Bariatric Surgery    5. Healthcare maintenance  - Comp Metabolic Panel; Future  - CBC WITH DIFFERENTIAL; Future  - Lipid Profile; Future  - HEMOGLOBIN A1C; Future    Follow-up: Return in about 2 months (around 7/11/2022) for lab review or sooner if symptoms worsen.

## 2022-05-12 ENCOUNTER — HOSPITAL ENCOUNTER (OUTPATIENT)
Dept: RADIOLOGY | Facility: MEDICAL CENTER | Age: 57
End: 2022-05-12
Attending: FAMILY MEDICINE
Payer: COMMERCIAL

## 2022-05-12 DIAGNOSIS — M25.559 HIP PAIN: ICD-10-CM

## 2022-05-12 DIAGNOSIS — C50.911 INFILTRATING DUCTAL CARCINOMA OF RIGHT BREAST (HCC): ICD-10-CM

## 2022-05-12 PROCEDURE — 73501 X-RAY EXAM HIP UNI 1 VIEW: CPT | Mod: RT

## 2022-05-19 DIAGNOSIS — M25.559 HIP PAIN: ICD-10-CM

## 2022-06-07 ENCOUNTER — HOSPITAL ENCOUNTER (OUTPATIENT)
Dept: RADIOLOGY | Facility: MEDICAL CENTER | Age: 57
End: 2022-06-07
Attending: SURGERY
Payer: COMMERCIAL

## 2022-06-07 DIAGNOSIS — Z85.3 PERSONAL HISTORY OF MALIGNANT NEOPLASM OF BREAST: ICD-10-CM

## 2022-06-07 PROCEDURE — G0279 TOMOSYNTHESIS, MAMMO: HCPCS

## 2022-06-21 NOTE — PROGRESS NOTES
Virtual Visit: Established Patient   This visit was conducted via Zoom using secure and encrypted videoconferencing technology.   The patient was in their home in the [facility].    The patient's identity was confirmed and verbal consent was obtained for this virtual visit.     Subjective:   CC:   Chief Complaint   Patient presents with   • Ankle Pain   • Foot Problem     Right side       Laura Cano is a 56 y.o. female presenting for evaluation and management of:      Right ankle and foot pain- onset 6/19/22 with initial soreness after doing prolonged driving (she was the passenger in the car).  Yesterday, she woke up with severe pain of her ankle but has localized right lateral foot pain.  She has sever pain when she starts to walk but pain improves with walking.  +right foot and ankle swelling to base of right leg.  No calf pain.  Mild red rash of her medial ankle without increased warmth.  She is not sure when the rash appeared.    ROS   No fevers, chills.    Current medicines (including changes today)  Current Outpatient Medications   Medication Sig Dispense Refill   • cephALEXin (KEFLEX) 500 MG Cap Take 1 Capsule by mouth 4 times a day for 7 days. 28 Capsule 0   • fluconazole (DIFLUCAN) 150 MG tablet Take 1 Tablet by mouth one time as needed (yeast infection) for up to 1 dose. 1 Tablet 0   • lisinopril (PRINIVIL) 20 MG Tab TAKE ONE TABLET BY MOUTH ONE TIME DAILY TO REPLACE 10MG TABS 90 Tablet 0   • anastrozole (ARIMIDEX) 1 MG Tab        No current facility-administered medications for this visit.       Patient Active Problem List    Diagnosis Date Noted   • Malignant neoplasm of upper-outer quadrant of right breast in female, estrogen receptor positive (HCC) 06/08/2021   • Infiltrating ductal carcinoma of right breast (HCC) 05/05/2021   • Other headache syndrome 03/02/2021   • Fluid level behind tympanic membrane of right ear 03/02/2021   • Family history of osteoporosis 02/24/2021   • Obesity  "(BMI 30-39.9) 02/24/2021   • Vasovagal near syncope 12/16/2014   • Microcytic anemia 08/22/2012   • HTN (hypertension) 04/16/2012   • Acne 04/16/2012   • Family history of colon cancer 04/16/2012        Objective:   Ht 1.753 m (5' 9\") Comment: pt. reported  Wt 115 kg (253 lb) Comment: pt. reported  LMP  (LMP Unknown) Comment: October 2020  BMI 37.36 kg/m²   /70    Physical Exam:  Constitutional: Alert, no distress, well-groomed.  Skin: No rashes in visible areas.  Eye: Round. Conjunctiva clear, lids normal. No icterus.   ENMT: Lips pink without lesions, good dentition, moist mucous membranes. Phonation normal.  Neck: No masses, no thyromegaly. Moves freely without pain.  Respiratory: Unlabored respiratory effort, no cough or audible wheeze  Psych: Alert and oriented x3, normal affect and mood.   Right Ankle: + right ankle and foot swelling without bruising.  +medial ankle erythema without increased warmth per patient report ROM decreased. Tenderness to self palpation on 5th metatarsal. No tenderness to palpation along posterior edge of lateral and medial malleoli, base of 5th metatarsal or navicular bone. No tenderness along fibula. Squeeze test negative. External rotation test negative.  Sensation intact. 2+ pedal pulses unable to be palpated.     Assessment and Plan:   The following treatment plan was discussed:     1. Right ankle swelling  New issue.  X-ray ordered.  Ultrasound to rule out DVT ordered.  We will treat cellulitis per below  - DX-ANKLE 3+ VIEWS RIGHT; Future  - US-EXTREMITY VENOUS LOWER UNILAT RIGHT; Future    2. Right foot pain  New issue.  X-ray ordered.  Ultrasound ordered to rule out DVT.  We will treat cellulitis per below  - DX-FOOT-COMPLETE 3+ RIGHT; Future  - US-EXTREMITY VENOUS LOWER UNILAT RIGHT; Future    3. Cellulitis, unspecified cellulitis site  New issue, mild but has significant swelling of her ankle and foot.  X-rays and ultrasounds ordered.  Start Keflex, encourage " probiotic use.  As needed prescription for Diflucan given in case she gets a vaginal yeast infection.  Close follow-up recommended for persistent and worsening symptoms  - cephALEXin (KEFLEX) 500 MG Cap; Take 1 Capsule by mouth 4 times a day for 7 days.  Dispense: 28 Capsule; Refill: 0  - fluconazole (DIFLUCAN) 150 MG tablet; Take 1 Tablet by mouth one time as needed (yeast infection) for up to 1 dose.  Dispense: 1 Tablet; Refill: 0    4. Localized swelling of right lower leg  New issue.  Likely secondary to cellulitis.  Will check ultrasound to rule out DVT.  Close follow-up for imaging and recheck recommended  - US-EXTREMITY VENOUS LOWER UNILAT RIGHT; Future    5. Essential hypertension  Chronic, well controlled, asymptomatic.  Refill of medication given.  - lisinopril (PRINIVIL) 20 MG Tab; TAKE ONE TABLET BY MOUTH ONE TIME DAILY TO REPLACE 10MG TABS  Dispense: 90 Tablet; Refill: 0    Follow-up: Return if symptoms worsen or fail to improve after labs and imaging.

## 2022-06-22 ENCOUNTER — HOSPITAL ENCOUNTER (OUTPATIENT)
Dept: RADIOLOGY | Facility: MEDICAL CENTER | Age: 57
End: 2022-06-22
Attending: FAMILY MEDICINE
Payer: COMMERCIAL

## 2022-06-22 ENCOUNTER — TELEMEDICINE (OUTPATIENT)
Dept: MEDICAL GROUP | Facility: MEDICAL CENTER | Age: 57
End: 2022-06-22
Payer: COMMERCIAL

## 2022-06-22 VITALS — HEIGHT: 69 IN | WEIGHT: 253 LBS | BODY MASS INDEX: 37.47 KG/M2

## 2022-06-22 DIAGNOSIS — I10 ESSENTIAL HYPERTENSION: ICD-10-CM

## 2022-06-22 DIAGNOSIS — L03.90 CELLULITIS, UNSPECIFIED CELLULITIS SITE: ICD-10-CM

## 2022-06-22 DIAGNOSIS — M79.671 RIGHT FOOT PAIN: ICD-10-CM

## 2022-06-22 DIAGNOSIS — R22.41 LOCALIZED SWELLING OF RIGHT LOWER LEG: ICD-10-CM

## 2022-06-22 DIAGNOSIS — M25.471 RIGHT ANKLE SWELLING: ICD-10-CM

## 2022-06-22 PROCEDURE — 73610 X-RAY EXAM OF ANKLE: CPT | Mod: RT

## 2022-06-22 PROCEDURE — 73630 X-RAY EXAM OF FOOT: CPT | Mod: RT

## 2022-06-22 PROCEDURE — 99214 OFFICE O/P EST MOD 30 MIN: CPT | Mod: 95 | Performed by: FAMILY MEDICINE

## 2022-06-22 PROCEDURE — 93971 EXTREMITY STUDY: CPT | Mod: RT

## 2022-06-22 RX ORDER — LISINOPRIL 20 MG/1
TABLET ORAL
Qty: 90 TABLET | Refills: 0 | Status: SHIPPED
Start: 2022-06-22 | End: 2022-11-07

## 2022-06-22 RX ORDER — CEPHALEXIN 500 MG/1
500 CAPSULE ORAL 4 TIMES DAILY
Qty: 28 CAPSULE | Refills: 0 | Status: SHIPPED | OUTPATIENT
Start: 2022-06-22 | End: 2022-06-29

## 2022-06-22 RX ORDER — FLUCONAZOLE 150 MG/1
150 TABLET ORAL
Qty: 1 TABLET | Refills: 0 | Status: SHIPPED | OUTPATIENT
Start: 2022-06-22 | End: 2022-08-10

## 2022-06-22 ASSESSMENT — PATIENT HEALTH QUESTIONNAIRE - PHQ9: CLINICAL INTERPRETATION OF PHQ2 SCORE: 0

## 2022-06-23 ENCOUNTER — PATIENT MESSAGE (OUTPATIENT)
Dept: MEDICAL GROUP | Facility: MEDICAL CENTER | Age: 57
End: 2022-06-23
Payer: COMMERCIAL

## 2022-07-01 ENCOUNTER — HOSPITAL ENCOUNTER (OUTPATIENT)
Dept: LAB | Facility: MEDICAL CENTER | Age: 57
End: 2022-07-01
Attending: FAMILY MEDICINE
Payer: COMMERCIAL

## 2022-07-01 DIAGNOSIS — Z00.00 HEALTHCARE MAINTENANCE: ICD-10-CM

## 2022-07-01 DIAGNOSIS — E66.9 OBESITY (BMI 30-39.9): ICD-10-CM

## 2022-07-01 LAB
ALBUMIN SERPL BCP-MCNC: 4.4 G/DL (ref 3.2–4.9)
ALBUMIN/GLOB SERPL: 1.6 G/DL
ALP SERPL-CCNC: 87 U/L (ref 30–99)
ALT SERPL-CCNC: 33 U/L (ref 2–50)
ANION GAP SERPL CALC-SCNC: 11 MMOL/L (ref 7–16)
AST SERPL-CCNC: 31 U/L (ref 12–45)
BASOPHILS # BLD AUTO: 0.7 % (ref 0–1.8)
BASOPHILS # BLD: 0.04 K/UL (ref 0–0.12)
BILIRUB SERPL-MCNC: 0.4 MG/DL (ref 0.1–1.5)
BUN SERPL-MCNC: 16 MG/DL (ref 8–22)
CALCIUM SERPL-MCNC: 9.7 MG/DL (ref 8.4–10.2)
CHLORIDE SERPL-SCNC: 102 MMOL/L (ref 96–112)
CHOLEST SERPL-MCNC: 192 MG/DL (ref 100–199)
CO2 SERPL-SCNC: 26 MMOL/L (ref 20–33)
CREAT SERPL-MCNC: 0.63 MG/DL (ref 0.5–1.4)
EOSINOPHIL # BLD AUTO: 0.17 K/UL (ref 0–0.51)
EOSINOPHIL NFR BLD: 2.8 % (ref 0–6.9)
ERYTHROCYTE [DISTWIDTH] IN BLOOD BY AUTOMATED COUNT: 40.3 FL (ref 35.9–50)
EST. AVERAGE GLUCOSE BLD GHB EST-MCNC: 120 MG/DL
FASTING STATUS PATIENT QL REPORTED: NORMAL
GFR SERPLBLD CREATININE-BSD FMLA CKD-EPI: 105 ML/MIN/1.73 M 2
GLOBULIN SER CALC-MCNC: 2.8 G/DL (ref 1.9–3.5)
GLUCOSE SERPL-MCNC: 96 MG/DL (ref 65–99)
HBA1C MFR BLD: 5.8 % (ref 4–5.6)
HCT VFR BLD AUTO: 45.5 % (ref 37–47)
HDLC SERPL-MCNC: 58 MG/DL
HGB BLD-MCNC: 14.9 G/DL (ref 12–16)
IMM GRANULOCYTES # BLD AUTO: 0.02 K/UL (ref 0–0.11)
IMM GRANULOCYTES NFR BLD AUTO: 0.3 % (ref 0–0.9)
LDLC SERPL CALC-MCNC: 110 MG/DL
LYMPHOCYTES # BLD AUTO: 1.39 K/UL (ref 1–4.8)
LYMPHOCYTES NFR BLD: 23.2 % (ref 22–41)
MCH RBC QN AUTO: 30 PG (ref 27–33)
MCHC RBC AUTO-ENTMCNC: 32.7 G/DL (ref 33.6–35)
MCV RBC AUTO: 91.5 FL (ref 81.4–97.8)
MONOCYTES # BLD AUTO: 0.52 K/UL (ref 0–0.85)
MONOCYTES NFR BLD AUTO: 8.7 % (ref 0–13.4)
NEUTROPHILS # BLD AUTO: 3.86 K/UL (ref 2–7.15)
NEUTROPHILS NFR BLD: 64.3 % (ref 44–72)
NRBC # BLD AUTO: 0 K/UL
NRBC BLD-RTO: 0 /100 WBC
PLATELET # BLD AUTO: 227 K/UL (ref 164–446)
PMV BLD AUTO: 9.8 FL (ref 9–12.9)
POTASSIUM SERPL-SCNC: 4.3 MMOL/L (ref 3.6–5.5)
PROT SERPL-MCNC: 7.2 G/DL (ref 6–8.2)
RBC # BLD AUTO: 4.97 M/UL (ref 4.2–5.4)
SODIUM SERPL-SCNC: 139 MMOL/L (ref 135–145)
TRIGL SERPL-MCNC: 119 MG/DL (ref 0–149)
WBC # BLD AUTO: 6 K/UL (ref 4.8–10.8)

## 2022-07-01 PROCEDURE — 36415 COLL VENOUS BLD VENIPUNCTURE: CPT

## 2022-07-01 PROCEDURE — 80061 LIPID PANEL: CPT

## 2022-07-01 PROCEDURE — 85025 COMPLETE CBC W/AUTO DIFF WBC: CPT

## 2022-07-01 PROCEDURE — 80053 COMPREHEN METABOLIC PANEL: CPT

## 2022-07-01 PROCEDURE — 83036 HEMOGLOBIN GLYCOSYLATED A1C: CPT

## 2022-07-22 ENCOUNTER — PATIENT MESSAGE (OUTPATIENT)
Dept: MEDICAL GROUP | Facility: MEDICAL CENTER | Age: 57
End: 2022-07-22
Payer: COMMERCIAL

## 2022-07-22 DIAGNOSIS — L03.90 CELLULITIS, UNSPECIFIED CELLULITIS SITE: ICD-10-CM

## 2022-07-22 DIAGNOSIS — H65.91 FLUID LEVEL BEHIND TYMPANIC MEMBRANE OF RIGHT EAR: ICD-10-CM

## 2022-07-22 DIAGNOSIS — G44.89 OTHER HEADACHE SYNDROME: ICD-10-CM

## 2022-07-22 RX ORDER — FLUTICASONE PROPIONATE 50 MCG
1 SPRAY, SUSPENSION (ML) NASAL DAILY
Qty: 16 G | Refills: 0 | Status: SHIPPED | OUTPATIENT
Start: 2022-07-22 | End: 2023-03-28 | Stop reason: SDUPTHER

## 2022-07-22 RX ORDER — FLUCONAZOLE 150 MG/1
150 TABLET ORAL
Qty: 1 TABLET | Refills: 0 | Status: CANCELLED | OUTPATIENT
Start: 2022-07-22

## 2022-07-28 ENCOUNTER — OFFICE VISIT (OUTPATIENT)
Dept: URGENT CARE | Facility: CLINIC | Age: 57
End: 2022-07-28
Payer: COMMERCIAL

## 2022-07-28 VITALS
WEIGHT: 247 LBS | HEIGHT: 70 IN | DIASTOLIC BLOOD PRESSURE: 76 MMHG | BODY MASS INDEX: 35.36 KG/M2 | HEART RATE: 82 BPM | TEMPERATURE: 98.9 F | OXYGEN SATURATION: 96 % | SYSTOLIC BLOOD PRESSURE: 140 MMHG | RESPIRATION RATE: 16 BRPM

## 2022-07-28 DIAGNOSIS — M25.561 ACUTE PAIN OF RIGHT KNEE: ICD-10-CM

## 2022-07-28 PROCEDURE — 99213 OFFICE O/P EST LOW 20 MIN: CPT | Performed by: FAMILY MEDICINE

## 2022-07-28 ASSESSMENT — FIBROSIS 4 INDEX: FIB4 SCORE: 1.28

## 2022-07-28 NOTE — PROGRESS NOTES
"  Subjective:      56 y.o. female presents to urgent care for right knee pain that started last night.  She was at home when her large, mastiff dog ran into the lateral aspect of her right knee.  Since that time she has had intermittent pain, it comes with activity, is described as both achy and sharp, currently rated 0/10, but with activity goes up to 8/10.  She has been using Aleve with good relief in symptoms.  She denies any associated locking, catching, or sensation of instability.  Denies prior right knee injury.    She denies any other questions or concerns at this time.    Current problem list, medication, and past medical/surgical history were reviewed in Epic.    ROS  See HPI     Objective:      BP (!) 140/76 (BP Location: Left arm, Patient Position: Sitting, BP Cuff Size: Adult)   Pulse 82   Temp 37.2 °C (98.9 °F) (Temporal)   Resp 16   Ht 1.765 m (5' 9.5\")   Wt 112 kg (247 lb)   LMP  (LMP Unknown) Comment: October 2020  SpO2 96%   BMI 35.95 kg/m²     Physical Exam  Constitutional:       General: She is not in acute distress.     Appearance: She is not diaphoretic.   Cardiovascular:      Rate and Rhythm: Normal rate and regular rhythm.      Heart sounds: Normal heart sounds.   Pulmonary:      Effort: Pulmonary effort is normal. No respiratory distress.      Breath sounds: Normal breath sounds.   Musculoskeletal:      Comments: No discolorations or deformities noted to inspection of right knee.  Patella is freely mobile nontender.  She is not tender to palpation of joint lines.  Anterior and posterior drawer negative.  Unable to perform Thessaly due to pain.  Able to weight-bear as long as right foot is straight.   Neurological:      Mental Status: She is alert.   Psychiatric:         Mood and Affect: Affect normal.         Judgment: Judgment normal.       Assessment/Plan:     1. Acute pain of right knee  I do not believe we need XRAY at this time, but question the benefit of CT or MRI. Referral to " sports medicine has been placed.  Ice, heat, Tylenol, and ibuprofen as needed for symptomatic relief.  Handout of the exercises has been provided.  - Referral to Sports Medicine      Instructed to return to Urgent Care or nearest Emergency Department if symptoms fail to improve, for any change in condition, further concerns, or new concerning symptoms. Patient states understanding of the plan of care and discharge instructions.    Linsey Oakley M.D.

## 2022-08-10 ENCOUNTER — APPOINTMENT (OUTPATIENT)
Dept: RADIOLOGY | Facility: IMAGING CENTER | Age: 57
End: 2022-08-10
Attending: FAMILY MEDICINE
Payer: COMMERCIAL

## 2022-08-10 ENCOUNTER — OFFICE VISIT (OUTPATIENT)
Dept: SPORTS MEDICINE | Facility: CLINIC | Age: 57
End: 2022-08-10
Payer: COMMERCIAL

## 2022-08-10 VITALS
RESPIRATION RATE: 16 BRPM | OXYGEN SATURATION: 94 % | WEIGHT: 247 LBS | HEIGHT: 70 IN | SYSTOLIC BLOOD PRESSURE: 128 MMHG | BODY MASS INDEX: 35.36 KG/M2 | DIASTOLIC BLOOD PRESSURE: 80 MMHG | TEMPERATURE: 98.5 F | HEART RATE: 84 BPM

## 2022-08-10 DIAGNOSIS — S89.91XA RIGHT KNEE INJURY, INITIAL ENCOUNTER: ICD-10-CM

## 2022-08-10 PROCEDURE — 73564 X-RAY EXAM KNEE 4 OR MORE: CPT | Mod: TC,RT | Performed by: RADIOLOGY

## 2022-08-10 PROCEDURE — 99213 OFFICE O/P EST LOW 20 MIN: CPT | Performed by: FAMILY MEDICINE

## 2022-08-10 ASSESSMENT — ENCOUNTER SYMPTOMS: FEVER: 0

## 2022-08-10 ASSESSMENT — FIBROSIS 4 INDEX: FIB4 SCORE: 1.28

## 2022-08-10 NOTE — PROGRESS NOTES
Subjective:     Laura Cano is a 56 y.o. female who presents for Knee Pain (Right knee pain, onset due to dog colliding into the patient. )    HPI  Pt presents for evaluation of an acute problem  Pt with a right knee injury after a collision with dog   Hit from the lateral knee   Caused ground level fall and landed on anterior knee   Pain in the anterior and lateral knee   Knee feels unstable at times   No repeat falls or injuries since initial accident   No numbness or tingling  No past injuries or surgeries to her right knee    Review of Systems   Constitutional:  Negative for fever.   Skin:  Negative for rash.     PMH:  has a past medical history of Acne (4/16/2012), Arthritis, Breast cancer (HCC) (2021), Dental disorder, Dysuria (4/16/2012), Family history of colon cancer (4/16/2012), HTN (hypertension) (4/16/2012), Hypertension, Microcytic anemia (8/22/2012), Thrombosed external hemorrhoid (10/24/2012), and Vitamin d deficiency (4/16/2012).  MEDS:   Current Outpatient Medications:     multivitamin (THERAGRAN) Tab, Take 1 Tablet by mouth every day., Disp: , Rfl:     fluticasone (FLONASE) 50 MCG/ACT nasal spray, Administer 1 Spray into affected nostril(S) every day., Disp: 16 g, Rfl: 0    lisinopril (PRINIVIL) 20 MG Tab, TAKE ONE TABLET BY MOUTH ONE TIME DAILY TO REPLACE 10MG TABS, Disp: 90 Tablet, Rfl: 0    anastrozole (ARIMIDEX) 1 MG Tab, Take 1 mg by mouth every day., Disp: , Rfl:   ALLERGIES:   Allergies   Allergen Reactions    Sulfa Drugs Anaphylaxis     SURGHX:   Past Surgical History:   Procedure Laterality Date    PB MASTECTOMY, PARTIAL Right 5/19/2021    Procedure: MASTECTOMY, PARTIAL - SHER PLACEMENT;  Surgeon: Danielle Coulter M.D.;  Location: SURGERY SAME DAY [facility];  Service: General    NODE BIOPSY SENTINEL Right 5/19/2021    Procedure: BIOPSY, LYMPH NODE, SENTINEL - AXILLARY AFTER INJECTION;  Surgeon: Danielle Coulter M.D.;  Location: SURGERY SAME DAY [facility];  Service: General     "LUMPECTOMY Right 05/19/2021    IDC    FL RADIATION THERAPY PLAN SIMPLE Right 2021    TONSILLECTOMY      TUBAL LIGATION       SOCHX:  reports that she has never smoked. She has never used smokeless tobacco. She reports current alcohol use. She reports that she does not use drugs.     Objective:   /80 (BP Location: Left arm, Patient Position: Sitting, BP Cuff Size: Large adult)   Pulse 84   Temp 36.9 °C (98.5 °F) (Temporal)   Resp 16   Ht 1.765 m (5' 9.5\")   Wt 112 kg (247 lb)   LMP  (LMP Unknown) Comment: October 2020  SpO2 94%   BMI 35.95 kg/m²     Physical Exam  Constitutional:       General: She is not in acute distress.     Appearance: She is well-developed. She is not diaphoretic.   Pulmonary:      Effort: Pulmonary effort is normal.   Neurological:      Mental Status: She is alert.   Right knee  Appearance - No bruising, erythema, or deformity appreciated  Palpation - No tenderness to palpation along joint lines, patellar tendon, hamstring tendons, or quads.  +TTP focally at fibular head and lateral knee   ROM - FROM without crepitus  Strength - 5/5 throughout  Neuro - Sensation equal and intact bilaterally  Special testing - No laxity with varus/valgus stress, +lateral pain with varus stress, neg anterior drawer, neg posterior drawer, neg Lachman's, neg Uriah's, neg patellar apprehension test    Assessment/Plan:   Assessment    1. Right knee injury, initial encounter  - DX-KNEE COMPLETE 4+ RIGHT; Future    Patient with right knee injury.  On exam, retains full range of motion and good function.  Has focal tenderness in the lateral knee and did obtain x-ray to rule out fibular head fracture.  No fracture or other bony abnormality appreciated.  Patient given hinged knee brace to help with instability.  Also given some exercises and stretches to start working on.  As long as she continues making great improvements, no further follow-up required.  If still having significant pain in 3 to 4 weeks, " would recommend MRI.  Follow-up in this office on an as-needed basis.

## 2022-08-12 ENCOUNTER — APPOINTMENT (OUTPATIENT)
Dept: RADIOLOGY | Facility: MEDICAL CENTER | Age: 57
End: 2022-08-12
Attending: COLON & RECTAL SURGERY
Payer: COMMERCIAL

## 2022-09-06 ENCOUNTER — HOSPITAL ENCOUNTER (OUTPATIENT)
Dept: RADIOLOGY | Facility: MEDICAL CENTER | Age: 57
End: 2022-09-06
Attending: COLON & RECTAL SURGERY
Payer: COMMERCIAL

## 2022-09-06 DIAGNOSIS — Z01.818 ENCOUNTER FOR OTHER PREPROCEDURAL EXAMINATION: ICD-10-CM

## 2022-09-06 DIAGNOSIS — E66.9 OBESITY, UNSPECIFIED CLASSIFICATION, UNSPECIFIED OBESITY TYPE, UNSPECIFIED WHETHER SERIOUS COMORBIDITY PRESENT: ICD-10-CM

## 2022-09-06 PROCEDURE — 74240 X-RAY XM UPR GI TRC 1CNTRST: CPT

## 2022-09-06 PROCEDURE — 71046 X-RAY EXAM CHEST 2 VIEWS: CPT

## 2022-09-16 ENCOUNTER — PRE-ADMISSION TESTING (OUTPATIENT)
Dept: ADMISSIONS | Facility: MEDICAL CENTER | Age: 57
End: 2022-09-16
Attending: COLON & RECTAL SURGERY
Payer: COMMERCIAL

## 2022-09-16 DIAGNOSIS — Z01.812 PRE-OPERATIVE LABORATORY EXAMINATION: ICD-10-CM

## 2022-09-16 DIAGNOSIS — Z01.810 PRE-OPERATIVE CARDIOVASCULAR EXAMINATION: ICD-10-CM

## 2022-09-16 LAB
ALBUMIN SERPL BCP-MCNC: 4.4 G/DL (ref 3.2–4.9)
ALBUMIN/GLOB SERPL: 1.6 G/DL
ALP SERPL-CCNC: 118 U/L (ref 30–99)
ALT SERPL-CCNC: 22 U/L (ref 2–50)
ANION GAP SERPL CALC-SCNC: 9 MMOL/L (ref 7–16)
AST SERPL-CCNC: 13 U/L (ref 12–45)
BASOPHILS # BLD AUTO: 1 % (ref 0–1.8)
BASOPHILS # BLD: 0.07 K/UL (ref 0–0.12)
BILIRUB SERPL-MCNC: 0.2 MG/DL (ref 0.1–1.5)
BUN SERPL-MCNC: 16 MG/DL (ref 8–22)
CALCIUM SERPL-MCNC: 9.6 MG/DL (ref 8.5–10.5)
CHLORIDE SERPL-SCNC: 104 MMOL/L (ref 96–112)
CO2 SERPL-SCNC: 28 MMOL/L (ref 20–33)
CREAT SERPL-MCNC: 0.77 MG/DL (ref 0.5–1.4)
EKG IMPRESSION: NORMAL
EOSINOPHIL # BLD AUTO: 0.24 K/UL (ref 0–0.51)
EOSINOPHIL NFR BLD: 3.4 % (ref 0–6.9)
ERYTHROCYTE [DISTWIDTH] IN BLOOD BY AUTOMATED COUNT: 39.6 FL (ref 35.9–50)
GFR SERPLBLD CREATININE-BSD FMLA CKD-EPI: 91 ML/MIN/1.73 M 2
GLOBULIN SER CALC-MCNC: 2.8 G/DL (ref 1.9–3.5)
GLUCOSE SERPL-MCNC: 109 MG/DL (ref 65–99)
HCT VFR BLD AUTO: 44.4 % (ref 37–47)
HGB BLD-MCNC: 14.6 G/DL (ref 12–16)
IMM GRANULOCYTES # BLD AUTO: 0.03 K/UL (ref 0–0.11)
IMM GRANULOCYTES NFR BLD AUTO: 0.4 % (ref 0–0.9)
INR PPP: 0.92 (ref 0.87–1.13)
LYMPHOCYTES # BLD AUTO: 1.62 K/UL (ref 1–4.8)
LYMPHOCYTES NFR BLD: 23 % (ref 22–41)
MCH RBC QN AUTO: 29.7 PG (ref 27–33)
MCHC RBC AUTO-ENTMCNC: 32.9 G/DL (ref 33.6–35)
MCV RBC AUTO: 90.2 FL (ref 81.4–97.8)
MONOCYTES # BLD AUTO: 0.43 K/UL (ref 0–0.85)
MONOCYTES NFR BLD AUTO: 6.1 % (ref 0–13.4)
NEUTROPHILS # BLD AUTO: 4.65 K/UL (ref 2–7.15)
NEUTROPHILS NFR BLD: 66.1 % (ref 44–72)
NRBC # BLD AUTO: 0 K/UL
NRBC BLD-RTO: 0 /100 WBC
PLATELET # BLD AUTO: 247 K/UL (ref 164–446)
PMV BLD AUTO: 9.7 FL (ref 9–12.9)
POTASSIUM SERPL-SCNC: 4.5 MMOL/L (ref 3.6–5.5)
PROT SERPL-MCNC: 7.2 G/DL (ref 6–8.2)
PROTHROMBIN TIME: 12.3 SEC (ref 12–14.6)
RBC # BLD AUTO: 4.92 M/UL (ref 4.2–5.4)
SODIUM SERPL-SCNC: 141 MMOL/L (ref 135–145)
WBC # BLD AUTO: 7 K/UL (ref 4.8–10.8)

## 2022-09-16 PROCEDURE — 36415 COLL VENOUS BLD VENIPUNCTURE: CPT

## 2022-09-16 PROCEDURE — 85025 COMPLETE CBC W/AUTO DIFF WBC: CPT

## 2022-09-16 PROCEDURE — 93005 ELECTROCARDIOGRAM TRACING: CPT

## 2022-09-16 PROCEDURE — 85610 PROTHROMBIN TIME: CPT

## 2022-09-16 PROCEDURE — 80053 COMPREHEN METABOLIC PANEL: CPT

## 2022-09-16 PROCEDURE — 93010 ELECTROCARDIOGRAM REPORT: CPT | Performed by: INTERNAL MEDICINE

## 2022-09-16 ASSESSMENT — FIBROSIS 4 INDEX: FIB4 SCORE: 1.28

## 2022-09-19 ENCOUNTER — PATIENT MESSAGE (OUTPATIENT)
Dept: MEDICAL GROUP | Facility: MEDICAL CENTER | Age: 57
End: 2022-09-19
Payer: COMMERCIAL

## 2022-10-05 ENCOUNTER — ANESTHESIA EVENT (OUTPATIENT)
Dept: SURGERY | Facility: MEDICAL CENTER | Age: 57
End: 2022-10-05
Payer: COMMERCIAL

## 2022-10-05 ENCOUNTER — ANESTHESIA (OUTPATIENT)
Dept: SURGERY | Facility: MEDICAL CENTER | Age: 57
End: 2022-10-05
Payer: COMMERCIAL

## 2022-10-05 ENCOUNTER — HOSPITAL ENCOUNTER (OUTPATIENT)
Facility: MEDICAL CENTER | Age: 57
End: 2022-10-06
Attending: COLON & RECTAL SURGERY | Admitting: COLON & RECTAL SURGERY
Payer: COMMERCIAL

## 2022-10-05 PROBLEM — Z98.84 S/P LAPAROSCOPIC SLEEVE GASTRECTOMY: Status: ACTIVE | Noted: 2022-10-05

## 2022-10-05 LAB
GLUCOSE BLD STRIP.AUTO-MCNC: 117 MG/DL (ref 65–99)
GLUCOSE BLD STRIP.AUTO-MCNC: 94 MG/DL (ref 65–99)

## 2022-10-05 PROCEDURE — 700111 HCHG RX REV CODE 636 W/ 250 OVERRIDE (IP): Performed by: STUDENT IN AN ORGANIZED HEALTH CARE EDUCATION/TRAINING PROGRAM

## 2022-10-05 PROCEDURE — 700102 HCHG RX REV CODE 250 W/ 637 OVERRIDE(OP): Performed by: STUDENT IN AN ORGANIZED HEALTH CARE EDUCATION/TRAINING PROGRAM

## 2022-10-05 PROCEDURE — 160002 HCHG RECOVERY MINUTES (STAT): Performed by: COLON & RECTAL SURGERY

## 2022-10-05 PROCEDURE — 700101 HCHG RX REV CODE 250: Performed by: COLON & RECTAL SURGERY

## 2022-10-05 PROCEDURE — 700102 HCHG RX REV CODE 250 W/ 637 OVERRIDE(OP): Performed by: COLON & RECTAL SURGERY

## 2022-10-05 PROCEDURE — 82962 GLUCOSE BLOOD TEST: CPT

## 2022-10-05 PROCEDURE — 96375 TX/PRO/DX INJ NEW DRUG ADDON: CPT

## 2022-10-05 PROCEDURE — 88307 TISSUE EXAM BY PATHOLOGIST: CPT

## 2022-10-05 PROCEDURE — 88312 SPECIAL STAINS GROUP 1: CPT

## 2022-10-05 PROCEDURE — 700111 HCHG RX REV CODE 636 W/ 250 OVERRIDE (IP)

## 2022-10-05 PROCEDURE — 96374 THER/PROPH/DIAG INJ IV PUSH: CPT

## 2022-10-05 PROCEDURE — 51798 US URINE CAPACITY MEASURE: CPT

## 2022-10-05 PROCEDURE — 00797 ANES IPER UPR ABD GSTR PX MO: CPT | Performed by: STUDENT IN AN ORGANIZED HEALTH CARE EDUCATION/TRAINING PROGRAM

## 2022-10-05 PROCEDURE — 700101 HCHG RX REV CODE 250: Performed by: STUDENT IN AN ORGANIZED HEALTH CARE EDUCATION/TRAINING PROGRAM

## 2022-10-05 PROCEDURE — 160035 HCHG PACU - 1ST 60 MINS PHASE I: Performed by: COLON & RECTAL SURGERY

## 2022-10-05 PROCEDURE — A9270 NON-COVERED ITEM OR SERVICE: HCPCS | Performed by: STUDENT IN AN ORGANIZED HEALTH CARE EDUCATION/TRAINING PROGRAM

## 2022-10-05 PROCEDURE — G0378 HOSPITAL OBSERVATION PER HR: HCPCS

## 2022-10-05 PROCEDURE — A9270 NON-COVERED ITEM OR SERVICE: HCPCS | Performed by: COLON & RECTAL SURGERY

## 2022-10-05 PROCEDURE — 700105 HCHG RX REV CODE 258: Performed by: COLON & RECTAL SURGERY

## 2022-10-05 PROCEDURE — 160009 HCHG ANES TIME/MIN: Performed by: COLON & RECTAL SURGERY

## 2022-10-05 PROCEDURE — 96376 TX/PRO/DX INJ SAME DRUG ADON: CPT

## 2022-10-05 PROCEDURE — 160029 HCHG SURGERY MINUTES - 1ST 30 MINS LEVEL 4: Performed by: COLON & RECTAL SURGERY

## 2022-10-05 PROCEDURE — 700111 HCHG RX REV CODE 636 W/ 250 OVERRIDE (IP): Performed by: COLON & RECTAL SURGERY

## 2022-10-05 PROCEDURE — 160041 HCHG SURGERY MINUTES - EA ADDL 1 MIN LEVEL 4: Performed by: COLON & RECTAL SURGERY

## 2022-10-05 PROCEDURE — 700105 HCHG RX REV CODE 258: Performed by: STUDENT IN AN ORGANIZED HEALTH CARE EDUCATION/TRAINING PROGRAM

## 2022-10-05 PROCEDURE — 160036 HCHG PACU - EA ADDL 30 MINS PHASE I: Performed by: COLON & RECTAL SURGERY

## 2022-10-05 PROCEDURE — 160048 HCHG OR STATISTICAL LEVEL 1-5: Performed by: COLON & RECTAL SURGERY

## 2022-10-05 RX ORDER — HYDROMORPHONE HYDROCHLORIDE 1 MG/ML
0.4 INJECTION, SOLUTION INTRAMUSCULAR; INTRAVENOUS; SUBCUTANEOUS
Status: DISCONTINUED | OUTPATIENT
Start: 2022-10-05 | End: 2022-10-05 | Stop reason: HOSPADM

## 2022-10-05 RX ORDER — METOPROLOL TARTRATE 1 MG/ML
1 INJECTION, SOLUTION INTRAVENOUS
Status: DISCONTINUED | OUTPATIENT
Start: 2022-10-05 | End: 2022-10-05 | Stop reason: HOSPADM

## 2022-10-05 RX ORDER — ACETAMINOPHEN 500 MG
1000 TABLET ORAL EVERY 6 HOURS
Status: DISCONTINUED | OUTPATIENT
Start: 2022-10-06 | End: 2022-10-06 | Stop reason: HOSPADM

## 2022-10-05 RX ORDER — GABAPENTIN 300 MG/1
300 CAPSULE ORAL ONCE
Status: COMPLETED | OUTPATIENT
Start: 2022-10-05 | End: 2022-10-05

## 2022-10-05 RX ORDER — ONDANSETRON 2 MG/ML
4 INJECTION INTRAMUSCULAR; INTRAVENOUS EVERY 4 HOURS PRN
Status: DISCONTINUED | OUTPATIENT
Start: 2022-10-05 | End: 2022-10-06 | Stop reason: HOSPADM

## 2022-10-05 RX ORDER — ACETAMINOPHEN 500 MG
1000 TABLET ORAL EVERY 6 HOURS PRN
Status: DISCONTINUED | OUTPATIENT
Start: 2022-10-10 | End: 2022-10-06 | Stop reason: HOSPADM

## 2022-10-05 RX ORDER — DIPHENHYDRAMINE HCL 25 MG
25 TABLET ORAL EVERY 6 HOURS PRN
Status: DISCONTINUED | OUTPATIENT
Start: 2022-10-05 | End: 2022-10-06 | Stop reason: HOSPADM

## 2022-10-05 RX ORDER — OXYCODONE HCL 5 MG/5 ML
5 SOLUTION, ORAL ORAL
Status: DISCONTINUED | OUTPATIENT
Start: 2022-10-05 | End: 2022-10-06 | Stop reason: HOSPADM

## 2022-10-05 RX ORDER — LISINOPRIL 10 MG/1
20 TABLET ORAL EVERY EVENING
Status: DISCONTINUED | OUTPATIENT
Start: 2022-10-05 | End: 2022-10-06 | Stop reason: HOSPADM

## 2022-10-05 RX ORDER — HYDRALAZINE HYDROCHLORIDE 20 MG/ML
5 INJECTION INTRAMUSCULAR; INTRAVENOUS
Status: DISCONTINUED | OUTPATIENT
Start: 2022-10-05 | End: 2022-10-05 | Stop reason: HOSPADM

## 2022-10-05 RX ORDER — ENOXAPARIN SODIUM 100 MG/ML
40 INJECTION SUBCUTANEOUS
Status: DISCONTINUED | OUTPATIENT
Start: 2022-10-06 | End: 2022-10-06 | Stop reason: HOSPADM

## 2022-10-05 RX ORDER — DIPHENHYDRAMINE HYDROCHLORIDE 50 MG/ML
12.5 INJECTION INTRAMUSCULAR; INTRAVENOUS
Status: DISCONTINUED | OUTPATIENT
Start: 2022-10-05 | End: 2022-10-05 | Stop reason: HOSPADM

## 2022-10-05 RX ORDER — MIDAZOLAM HYDROCHLORIDE 1 MG/ML
1 INJECTION INTRAMUSCULAR; INTRAVENOUS
Status: DISCONTINUED | OUTPATIENT
Start: 2022-10-05 | End: 2022-10-05 | Stop reason: HOSPADM

## 2022-10-05 RX ORDER — ENALAPRILAT 1.25 MG/ML
2.5 INJECTION INTRAVENOUS EVERY 6 HOURS PRN
Status: DISCONTINUED | OUTPATIENT
Start: 2022-10-05 | End: 2022-10-06 | Stop reason: HOSPADM

## 2022-10-05 RX ORDER — ONDANSETRON 2 MG/ML
4 INJECTION INTRAMUSCULAR; INTRAVENOUS
Status: DISCONTINUED | OUTPATIENT
Start: 2022-10-05 | End: 2022-10-05 | Stop reason: HOSPADM

## 2022-10-05 RX ORDER — LABETALOL HYDROCHLORIDE 5 MG/ML
5 INJECTION, SOLUTION INTRAVENOUS
Status: DISCONTINUED | OUTPATIENT
Start: 2022-10-05 | End: 2022-10-05 | Stop reason: HOSPADM

## 2022-10-05 RX ORDER — ALBUTEROL SULFATE 2.5 MG/3ML
2.5 SOLUTION RESPIRATORY (INHALATION)
Status: DISCONTINUED | OUTPATIENT
Start: 2022-10-05 | End: 2022-10-05 | Stop reason: HOSPADM

## 2022-10-05 RX ORDER — OXYCODONE HCL 5 MG/5 ML
10 SOLUTION, ORAL ORAL
Status: DISCONTINUED | OUTPATIENT
Start: 2022-10-05 | End: 2022-10-05 | Stop reason: HOSPADM

## 2022-10-05 RX ORDER — PROMETHAZINE HYDROCHLORIDE 25 MG/1
25 SUPPOSITORY RECTAL EVERY 4 HOURS PRN
Status: DISCONTINUED | OUTPATIENT
Start: 2022-10-05 | End: 2022-10-06 | Stop reason: HOSPADM

## 2022-10-05 RX ORDER — MEPERIDINE HYDROCHLORIDE 25 MG/ML
12.5 INJECTION INTRAMUSCULAR; INTRAVENOUS; SUBCUTANEOUS
Status: DISCONTINUED | OUTPATIENT
Start: 2022-10-05 | End: 2022-10-05 | Stop reason: HOSPADM

## 2022-10-05 RX ORDER — BUPIVACAINE HYDROCHLORIDE AND EPINEPHRINE 5; 5 MG/ML; UG/ML
INJECTION, SOLUTION PERINEURAL
Status: DISCONTINUED | OUTPATIENT
Start: 2022-10-05 | End: 2022-10-05 | Stop reason: HOSPADM

## 2022-10-05 RX ORDER — HYDROMORPHONE HYDROCHLORIDE 1 MG/ML
0.2 INJECTION, SOLUTION INTRAMUSCULAR; INTRAVENOUS; SUBCUTANEOUS
Status: DISCONTINUED | OUTPATIENT
Start: 2022-10-05 | End: 2022-10-05 | Stop reason: HOSPADM

## 2022-10-05 RX ORDER — ESMOLOL HYDROCHLORIDE 10 MG/ML
INJECTION INTRAVENOUS PRN
Status: DISCONTINUED | OUTPATIENT
Start: 2022-10-05 | End: 2022-10-05 | Stop reason: SURG

## 2022-10-05 RX ORDER — OXYCODONE HCL 5 MG/5 ML
10 SOLUTION, ORAL ORAL
Status: DISCONTINUED | OUTPATIENT
Start: 2022-10-05 | End: 2022-10-06 | Stop reason: HOSPADM

## 2022-10-05 RX ORDER — CALCIUM CARBONATE 500 MG/1
500 TABLET, CHEWABLE ORAL
Status: DISCONTINUED | OUTPATIENT
Start: 2022-10-05 | End: 2022-10-06 | Stop reason: HOSPADM

## 2022-10-05 RX ORDER — SCOLOPAMINE TRANSDERMAL SYSTEM 1 MG/1
1 PATCH, EXTENDED RELEASE TRANSDERMAL ONCE
Status: DISCONTINUED | OUTPATIENT
Start: 2022-10-05 | End: 2022-10-05 | Stop reason: HOSPADM

## 2022-10-05 RX ORDER — DEXAMETHASONE SODIUM PHOSPHATE 4 MG/ML
4 INJECTION, SOLUTION INTRA-ARTICULAR; INTRALESIONAL; INTRAMUSCULAR; INTRAVENOUS; SOFT TISSUE EVERY 6 HOURS
Status: DISCONTINUED | OUTPATIENT
Start: 2022-10-05 | End: 2022-10-06 | Stop reason: HOSPADM

## 2022-10-05 RX ORDER — SODIUM CHLORIDE, SODIUM LACTATE, POTASSIUM CHLORIDE, AND CALCIUM CHLORIDE .6; .31; .03; .02 G/100ML; G/100ML; G/100ML; G/100ML
500 INJECTION, SOLUTION INTRAVENOUS
Status: COMPLETED | OUTPATIENT
Start: 2022-10-05 | End: 2022-10-05

## 2022-10-05 RX ORDER — HYDROMORPHONE HYDROCHLORIDE 1 MG/ML
0.1 INJECTION, SOLUTION INTRAMUSCULAR; INTRAVENOUS; SUBCUTANEOUS
Status: DISCONTINUED | OUTPATIENT
Start: 2022-10-05 | End: 2022-10-05 | Stop reason: HOSPADM

## 2022-10-05 RX ORDER — ONDANSETRON 2 MG/ML
INJECTION INTRAMUSCULAR; INTRAVENOUS PRN
Status: DISCONTINUED | OUTPATIENT
Start: 2022-10-05 | End: 2022-10-05 | Stop reason: SURG

## 2022-10-05 RX ORDER — ACETAMINOPHEN 10 MG/ML
1 INJECTION, SOLUTION INTRAVENOUS ONCE
Status: COMPLETED | OUTPATIENT
Start: 2022-10-05 | End: 2022-10-05

## 2022-10-05 RX ORDER — SODIUM CHLORIDE AND POTASSIUM CHLORIDE 150; 900 MG/100ML; MG/100ML
INJECTION, SOLUTION INTRAVENOUS CONTINUOUS
Status: DISCONTINUED | OUTPATIENT
Start: 2022-10-05 | End: 2022-10-06 | Stop reason: HOSPADM

## 2022-10-05 RX ORDER — OXYCODONE HCL 10 MG/1
10 TABLET, FILM COATED, EXTENDED RELEASE ORAL ONCE
Status: COMPLETED | OUTPATIENT
Start: 2022-10-05 | End: 2022-10-05

## 2022-10-05 RX ORDER — HYDROMORPHONE HYDROCHLORIDE 1 MG/ML
0.5 INJECTION, SOLUTION INTRAMUSCULAR; INTRAVENOUS; SUBCUTANEOUS
Status: DISCONTINUED | OUTPATIENT
Start: 2022-10-05 | End: 2022-10-06 | Stop reason: HOSPADM

## 2022-10-05 RX ORDER — GABAPENTIN 300 MG/1
300 CAPSULE ORAL 3 TIMES DAILY
Status: DISCONTINUED | OUTPATIENT
Start: 2022-10-05 | End: 2022-10-06 | Stop reason: HOSPADM

## 2022-10-05 RX ORDER — HALOPERIDOL 5 MG/ML
1 INJECTION INTRAMUSCULAR
Status: DISCONTINUED | OUTPATIENT
Start: 2022-10-05 | End: 2022-10-05 | Stop reason: HOSPADM

## 2022-10-05 RX ORDER — CEFAZOLIN SODIUM 1 G/3ML
INJECTION, POWDER, FOR SOLUTION INTRAMUSCULAR; INTRAVENOUS PRN
Status: DISCONTINUED | OUTPATIENT
Start: 2022-10-05 | End: 2022-10-05 | Stop reason: SURG

## 2022-10-05 RX ORDER — DEXAMETHASONE SODIUM PHOSPHATE 4 MG/ML
INJECTION, SOLUTION INTRA-ARTICULAR; INTRALESIONAL; INTRAMUSCULAR; INTRAVENOUS; SOFT TISSUE PRN
Status: DISCONTINUED | OUTPATIENT
Start: 2022-10-05 | End: 2022-10-05 | Stop reason: SURG

## 2022-10-05 RX ORDER — DIPHENHYDRAMINE HYDROCHLORIDE 50 MG/ML
25 INJECTION INTRAMUSCULAR; INTRAVENOUS EVERY 6 HOURS PRN
Status: DISCONTINUED | OUTPATIENT
Start: 2022-10-05 | End: 2022-10-06 | Stop reason: HOSPADM

## 2022-10-05 RX ORDER — SODIUM CHLORIDE, SODIUM LACTATE, POTASSIUM CHLORIDE, CALCIUM CHLORIDE 600; 310; 30; 20 MG/100ML; MG/100ML; MG/100ML; MG/100ML
INJECTION, SOLUTION INTRAVENOUS CONTINUOUS
Status: ACTIVE | OUTPATIENT
Start: 2022-10-05 | End: 2022-10-05

## 2022-10-05 RX ORDER — ROCURONIUM BROMIDE 10 MG/ML
INJECTION, SOLUTION INTRAVENOUS PRN
Status: DISCONTINUED | OUTPATIENT
Start: 2022-10-05 | End: 2022-10-05 | Stop reason: SURG

## 2022-10-05 RX ORDER — LABETALOL HYDROCHLORIDE 5 MG/ML
10 INJECTION, SOLUTION INTRAVENOUS EVERY 4 HOURS PRN
Status: DISCONTINUED | OUTPATIENT
Start: 2022-10-05 | End: 2022-10-06 | Stop reason: HOSPADM

## 2022-10-05 RX ORDER — ACETAMINOPHEN 10 MG/ML
1000 INJECTION, SOLUTION INTRAVENOUS EVERY 6 HOURS
Status: COMPLETED | OUTPATIENT
Start: 2022-10-05 | End: 2022-10-06

## 2022-10-05 RX ORDER — MIDAZOLAM HYDROCHLORIDE 1 MG/ML
INJECTION INTRAMUSCULAR; INTRAVENOUS PRN
Status: DISCONTINUED | OUTPATIENT
Start: 2022-10-05 | End: 2022-10-05 | Stop reason: SURG

## 2022-10-05 RX ORDER — OXYCODONE HCL 5 MG/5 ML
10 SOLUTION, ORAL ORAL
Status: COMPLETED | OUTPATIENT
Start: 2022-10-05 | End: 2022-10-05

## 2022-10-05 RX ORDER — OXYCODONE HCL 5 MG/5 ML
5 SOLUTION, ORAL ORAL
Status: COMPLETED | OUTPATIENT
Start: 2022-10-05 | End: 2022-10-05

## 2022-10-05 RX ORDER — OXYCODONE HCL 5 MG/5 ML
5 SOLUTION, ORAL ORAL
Status: DISCONTINUED | OUTPATIENT
Start: 2022-10-05 | End: 2022-10-05 | Stop reason: HOSPADM

## 2022-10-05 RX ADMIN — FENTANYL CITRATE 50 MCG: 50 INJECTION, SOLUTION INTRAMUSCULAR; INTRAVENOUS at 10:50

## 2022-10-05 RX ADMIN — FENTANYL CITRATE 50 MCG: 50 INJECTION, SOLUTION INTRAMUSCULAR; INTRAVENOUS at 10:09

## 2022-10-05 RX ADMIN — FENTANYL CITRATE 50 MCG: 50 INJECTION, SOLUTION INTRAMUSCULAR; INTRAVENOUS at 11:04

## 2022-10-05 RX ADMIN — SODIUM CHLORIDE, POTASSIUM CHLORIDE, SODIUM LACTATE AND CALCIUM CHLORIDE 500 ML: 600; 310; 30; 20 INJECTION, SOLUTION INTRAVENOUS at 18:47

## 2022-10-05 RX ADMIN — HYDROMORPHONE HYDROCHLORIDE 0.2 MG: 1 INJECTION, SOLUTION INTRAMUSCULAR; INTRAVENOUS; SUBCUTANEOUS at 13:57

## 2022-10-05 RX ADMIN — FENTANYL CITRATE 50 MCG: 50 INJECTION, SOLUTION INTRAMUSCULAR; INTRAVENOUS at 09:59

## 2022-10-05 RX ADMIN — ONDANSETRON 4 MG: 2 INJECTION INTRAMUSCULAR; INTRAVENOUS at 09:50

## 2022-10-05 RX ADMIN — FAMOTIDINE 20 MG: 10 INJECTION INTRAVENOUS at 17:18

## 2022-10-05 RX ADMIN — DEXAMETHASONE SODIUM PHOSPHATE 4 MG: 4 INJECTION, SOLUTION INTRA-ARTICULAR; INTRALESIONAL; INTRAMUSCULAR; INTRAVENOUS; SOFT TISSUE at 09:50

## 2022-10-05 RX ADMIN — POTASSIUM CHLORIDE AND SODIUM CHLORIDE: 900; 150 INJECTION, SOLUTION INTRAVENOUS at 17:17

## 2022-10-05 RX ADMIN — OXYCODONE HYDROCHLORIDE 10 MG: 5 SOLUTION ORAL at 10:56

## 2022-10-05 RX ADMIN — SODIUM CHLORIDE, POTASSIUM CHLORIDE, SODIUM LACTATE AND CALCIUM CHLORIDE: 600; 310; 30; 20 INJECTION, SOLUTION INTRAVENOUS at 09:34

## 2022-10-05 RX ADMIN — OXYCODONE HYDROCHLORIDE 10 MG: 10 TABLET, FILM COATED, EXTENDED RELEASE ORAL at 07:53

## 2022-10-05 RX ADMIN — PROPOFOL 150 MG: 10 INJECTION, EMULSION INTRAVENOUS at 09:39

## 2022-10-05 RX ADMIN — HYDROMORPHONE HYDROCHLORIDE 0.4 MG: 1 INJECTION, SOLUTION INTRAMUSCULAR; INTRAVENOUS; SUBCUTANEOUS at 11:12

## 2022-10-05 RX ADMIN — ACETAMINOPHEN 1 G: 10 INJECTION INTRAVENOUS at 08:07

## 2022-10-05 RX ADMIN — ACETAMINOPHEN 1000 MG: 10 INJECTION INTRAVENOUS at 17:17

## 2022-10-05 RX ADMIN — DEXAMETHASONE SODIUM PHOSPHATE 4 MG: 4 INJECTION, SOLUTION INTRA-ARTICULAR; INTRALESIONAL; INTRAMUSCULAR; INTRAVENOUS; SOFT TISSUE at 17:18

## 2022-10-05 RX ADMIN — FENTANYL CITRATE 100 MCG: 50 INJECTION, SOLUTION INTRAMUSCULAR; INTRAVENOUS at 09:36

## 2022-10-05 RX ADMIN — HYDROMORPHONE HYDROCHLORIDE 0.2 MG: 1 INJECTION, SOLUTION INTRAMUSCULAR; INTRAVENOUS; SUBCUTANEOUS at 13:51

## 2022-10-05 RX ADMIN — MIDAZOLAM HYDROCHLORIDE 2 MG: 1 INJECTION, SOLUTION INTRAMUSCULAR; INTRAVENOUS at 09:36

## 2022-10-05 RX ADMIN — DEXAMETHASONE SODIUM PHOSPHATE 4 MG: 4 INJECTION, SOLUTION INTRA-ARTICULAR; INTRALESIONAL; INTRAMUSCULAR; INTRAVENOUS; SOFT TISSUE at 23:55

## 2022-10-05 RX ADMIN — GABAPENTIN 300 MG: 300 CAPSULE ORAL at 17:17

## 2022-10-05 RX ADMIN — CEFAZOLIN 3 G: 330 INJECTION, POWDER, FOR SOLUTION INTRAMUSCULAR; INTRAVENOUS at 09:40

## 2022-10-05 RX ADMIN — GABAPENTIN 300 MG: 300 CAPSULE ORAL at 07:54

## 2022-10-05 RX ADMIN — ROCURONIUM BROMIDE 50 MG: 10 INJECTION, SOLUTION INTRAVENOUS at 09:39

## 2022-10-05 RX ADMIN — LISINOPRIL 20 MG: 10 TABLET ORAL at 17:17

## 2022-10-05 RX ADMIN — LABETALOL HYDROCHLORIDE 5 MG: 5 INJECTION INTRAVENOUS at 14:01

## 2022-10-05 RX ADMIN — ESMOLOL HYDROCHLORIDE 25 MG: 100 INJECTION, SOLUTION INTRAVENOUS at 10:00

## 2022-10-05 ASSESSMENT — LIFESTYLE VARIABLES
CONSUMPTION TOTAL: NEGATIVE
HAVE YOU EVER FELT YOU SHOULD CUT DOWN ON YOUR DRINKING: NO
TOTAL SCORE: 0
ON A TYPICAL DAY WHEN YOU DRINK ALCOHOL HOW MANY DRINKS DO YOU HAVE: 0
AVERAGE NUMBER OF DAYS PER WEEK YOU HAVE A DRINK CONTAINING ALCOHOL: 0
TOTAL SCORE: 0
TOTAL SCORE: 0
HAVE PEOPLE ANNOYED YOU BY CRITICIZING YOUR DRINKING: NO
EVER FELT BAD OR GUILTY ABOUT YOUR DRINKING: NO
EVER HAD A DRINK FIRST THING IN THE MORNING TO STEADY YOUR NERVES TO GET RID OF A HANGOVER: NO
HOW MANY TIMES IN THE PAST YEAR HAVE YOU HAD 5 OR MORE DRINKS IN A DAY: 0
ALCOHOL_USE: NO
DOES PATIENT WANT TO STOP DRINKING: NO

## 2022-10-05 ASSESSMENT — PAIN SCALES - GENERAL: PAIN_LEVEL: 4

## 2022-10-05 ASSESSMENT — PATIENT HEALTH QUESTIONNAIRE - PHQ9
1. LITTLE INTEREST OR PLEASURE IN DOING THINGS: NOT AT ALL
2. FEELING DOWN, DEPRESSED, IRRITABLE, OR HOPELESS: NOT AT ALL
SUM OF ALL RESPONSES TO PHQ9 QUESTIONS 1 AND 2: 0

## 2022-10-05 ASSESSMENT — FIBROSIS 4 INDEX
FIB4 SCORE: 0.61
FIB4 SCORE: 0.61

## 2022-10-05 ASSESSMENT — PAIN DESCRIPTION - PAIN TYPE
TYPE: ACUTE PAIN;SURGICAL PAIN
TYPE: SURGICAL PAIN

## 2022-10-05 NOTE — PROGRESS NOTES
4 Eyes Skin Assessment Completed by YOEL pandya and YOEL tate.    Head WDL  Ears WDL  Nose WDL  Mouth WDL  Neck WDL  Breast/Chest WDL  Shoulder Blades WDL  Spine WDL  (R) Arm/Elbow/Hand WDL  (L) Arm/Elbow/Hand WDL  Abdomen Incision, x4 lap sites  Groin WDL  Scrotum/Coccyx/Buttocks WDL  (R) Leg WDL  (L) Leg WDL  (R) Heel/Foot/Toe WDL  (L) Heel/Foot/Toe WDL          Devices In Places Pulse Ox      Interventions In Place N/A    Possible Skin Injury No    Pictures Uploaded Into Epic N/A  Wound Consult Placed N/A  RN Wound Prevention Protocol Ordered No

## 2022-10-05 NOTE — ANESTHESIA POSTPROCEDURE EVALUATION
Patient: Laura Cano    Procedure Summary     Date: 10/05/22 Room / Location: [place] [address] / SURGERY [facility]    Anesthesia Start: 0934 Anesthesia Stop: 1037    Procedure: LAPAROSCOPIC SLEEVE GASTRECTOMY (Abdomen) Diagnosis: (MORBID OBESITY)    Surgeons: Jeison Cano M.D. Responsible Provider: Marshall Clements M.D.    Anesthesia Type: general ASA Status: 3          Final Anesthesia Type: general  Last vitals  BP   Blood Pressure: (!) 171/79    Temp   36.3 °C (97.4 °F)    Pulse   66   Resp   18    SpO2   96 %      Anesthesia Post Evaluation    Patient location during evaluation: PACU  Patient participation: complete - patient participated  Level of consciousness: awake and alert  Pain score: 4    Airway patency: patent  Anesthetic complications: no  Cardiovascular status: hemodynamically stable  Respiratory status: acceptable  Hydration status: euvolemic    PONV: none          No notable events documented.     Nurse Pain Score: 4 (NPRS)

## 2022-10-05 NOTE — OP REPORT
NAME:  Laura Cano  MRN:  [id number]  :  1965      DATE OF OPERATION: 10/5/2022    PREOPERATIVE DIAGNOSIS: Morbid Obesity with medical sequelae    POSTOPERATIVE DIAGNOSIS: Morbid Obesity with medical sequelae    OPERATION PERFORMED: 1.  Laparoscopic Sleeve Gastrectomy    SURGEON: Jeison Cano MD    ASSISTANT:  Mine Busby PA-C, PA-C    ANESTHESIOLOGIST:  Anesthesiologist: Marshall Clements M.D.    ANESTHESIA: General endotracheal anesthesia.     SPECIMEN: Stomach    ESTIMATED BLOOD LOSS: <10cc.     INDICATIONS: The patient is a 56 y.o. female with a diagnosis of morbid obesity with medical sequelae. She is taken to the operating room today for Laparoscopic Sleeve Gastrectomy.     PROCEDURE: Following informed consent, the patient was properly identified, taken to the operating room, and placed in the supine position where general endotracheal anesthesia was administered. Intravenous antibiotics were administered by the anesthesiologist in the correct time interval. Sequential compression devices were employed. The abdomen was prepped and draped into a sterile field.     An optical entry bladeless  trocar was utilized and pneumoperitoneum carefully established in the usual fashion.  The bladeless 5 mm separator trocar was introduced and the 5 mm lens/camera was passed into the peritoneal cavity.  Three additional separator trocars were placed under direct vision.  A 5 mm Aníbal-type liver retractor was placed into position.  This was used to elevate the left sided segment of the liver.  It was secured to the patients right side with a robot arm.  Careful inspection revealed no untoward events with placement of the trocars.    The gastrocolic omentum was examined and dissected with the ligasure device and a point on the distal antrum was selected to begin the sleeve gastrectomy.  A 40 Greek bougie was then passed down into the antrum.  A careful inspection at the hiatus demonstrated no significant  hiatal hernia which would require repair or risk significant reflux. A echelon linear stapler with a thick-tissue cartridges, was employed to divide partway across the stomach.  With the bougie in position, the stapler was then used to march proximally along the stomach and transsection of the stomach was performed, beginning 5 cm proximal to the pylorus in the method of the sleeve gastrectomy.  The greater curvature aspect of the stomach was then dissected and the greater curvature vessels and short gastric vessels were divided with the ligasure.      The last endomechanical stapler firings were used to complete the transection of the stomach.  Hemoclips were used if any site exhibited oozing. Careful inspection of the staple line demonstrated excellent, meticulous hemostasis and a completely intact staple line with seemless tissue approximation.  Seromuscular sutures were placed using polysorb suture to further secure the staple line.  The liver exhibited mild hepatic steatosis.  The bougie was then removed.     The large endocatch bag was then used to retrieve the stomach specimen.  Tisseal fibrin glue sealant was sprayed along the entire staple line. The ports were removed under direct vision and the pneumoperitoneum was allowed to escape. The fascia of this port was closed with 0-Vicryl suture.  The port sites were then irrigated well.  The port site skin incisions were closed with interrupted 4-0 Vicryl subcuticular sutures.  Steri-Strips and Benzoin were applied beneath sterile Band-Aids.     The patient tolerated the procedure well and there were no apparent complications. All sponge, needle, and instrument counts were correct on 2 separate occasions. She was awakened, extubated, and transferred to the recovery room in satisfactory condition.       ____________________________________   Jeison Cano MD  DD: 10/5/2022  12:37 PM    CC:  [facility];

## 2022-10-05 NOTE — OR NURSING
1034 Pt arrived to PACU with Anesthesiologist and OR RN. OPA in place. Even, unlabored respirations. VSS. Abdominal lap sites x4 dressings CDI. Ice pack applied. FSBS 117.     1045 OPA d/c'd. AAOx4.  C/o 7/10 abdominal surgical site pain, see MAR for Fentanyl and Oxycodone administration. Denies nausea.     1100 POC update given to Barbara, daughter, over the phone. All questions answered.     1200 Belongings with daughter per patient.     1400 Per Dr. Cano, pt okay to go to CDU overnight.     1418 Pt meets floor criteria. Report called to Shazia Silverman RN on CDU.      1454 Pt transported to T203 with transport. Chart and full oxygen tank with patient.

## 2022-10-05 NOTE — ANESTHESIA TIME REPORT
Anesthesia Start and Stop Event Times     Date Time Event    10/5/2022 0928 Ready for Procedure     0934 Anesthesia Start     1037 Anesthesia Stop        Responsible Staff  10/05/22    Name Role Begin End    Marshall Clements M.D. Anesth 0934 1037        Overtime Reason:  no overtime (within assigned shift)    Comments:

## 2022-10-05 NOTE — ANESTHESIA PROCEDURE NOTES
Airway    Date/Time: 10/5/2022 9:40 AM  Performed by: Marshall Clements M.D.  Authorized by: Marshall Clements M.D.     Location:  OR  Urgency:  Elective  Indications for Airway Management:  Anesthesia      Spontaneous Ventilation: absent    Sedation Level:  Deep  Preoxygenated: Yes    Patient Position:  Sniffing  Mask Difficulty Assessment:  4 - unable to mask vent +/- NMBA  Final Airway Type:  Endotracheal airway  Final Endotracheal Airway:  ETT  Cuffed: Yes    Technique Used for Successful ETT Placement:  Direct laryngoscopy    Insertion Site:  Oral  Blade Type:  Lopez  Laryngoscope Blade/Videolaryngoscope Blade Size:  2  ETT Size (mm):  7.0  Measured from:  Teeth  ETT to Teeth (cm):  21  Placement Verified by: auscultation and capnometry    Cormack-Lehane Classification:  Grade IIa - partial view of glottis  Number of Attempts at Approach:  1  Ventilation Between Attempts:  None  Number of Other Approaches Attempted:  0

## 2022-10-05 NOTE — ANESTHESIA PREPROCEDURE EVALUATION
Case: [id number] Date/Time: 10/05/22 0900    Procedure: LAPAROSCOPIC SLEEVE GASTRECTOMY    Pre-op diagnosis: MORBID OBESITY    Location: [place] [address] / SURGERY [facility]    Surgeons: Jeison Cano M.D.          Relevant Problems   NEURO   (positive) Other headache syndrome      CARDIAC   (positive) HTN (hypertension)       Physical Exam    Airway   Mallampati: II  TM distance: >3 FB  Neck ROM: full       Cardiovascular - normal exam  Rhythm: regular  Rate: normal  (-) murmur     Dental - normal exam           Pulmonary - normal exam  Breath sounds clear to auscultation     Abdominal    Neurological - normal exam                 Anesthesia Plan    ASA 3   ASA physical status 3 criteria: morbid obesity - BMI greater than or equal to 40    Plan - general       Airway plan will be ETT          Induction: intravenous    Postoperative Plan: Postoperative administration of opioids is intended.    Pertinent diagnostic labs and testing reviewed    Informed Consent:    Anesthetic plan and risks discussed with patient.    Use of blood products discussed with: patient whom consented to blood products.

## 2022-10-06 VITALS
WEIGHT: 240.3 LBS | DIASTOLIC BLOOD PRESSURE: 91 MMHG | HEIGHT: 69 IN | OXYGEN SATURATION: 92 % | HEART RATE: 78 BPM | RESPIRATION RATE: 18 BRPM | TEMPERATURE: 99.1 F | BODY MASS INDEX: 35.59 KG/M2 | SYSTOLIC BLOOD PRESSURE: 153 MMHG

## 2022-10-06 LAB
ALBUMIN SERPL BCP-MCNC: 4.2 G/DL (ref 3.2–4.9)
ALBUMIN/GLOB SERPL: 1.8 G/DL
ALP SERPL-CCNC: 108 U/L (ref 30–99)
ALT SERPL-CCNC: 20 U/L (ref 2–50)
ANION GAP SERPL CALC-SCNC: 11 MMOL/L (ref 7–16)
AST SERPL-CCNC: 21 U/L (ref 12–45)
BILIRUB SERPL-MCNC: 0.3 MG/DL (ref 0.1–1.5)
BUN SERPL-MCNC: 8 MG/DL (ref 8–22)
CALCIUM SERPL-MCNC: 9.5 MG/DL (ref 8.5–10.5)
CHLORIDE SERPL-SCNC: 102 MMOL/L (ref 96–112)
CO2 SERPL-SCNC: 24 MMOL/L (ref 20–33)
CREAT SERPL-MCNC: 0.51 MG/DL (ref 0.5–1.4)
ERYTHROCYTE [DISTWIDTH] IN BLOOD BY AUTOMATED COUNT: 38.3 FL (ref 35.9–50)
GFR SERPLBLD CREATININE-BSD FMLA CKD-EPI: 110 ML/MIN/1.73 M 2
GLOBULIN SER CALC-MCNC: 2.3 G/DL (ref 1.9–3.5)
GLUCOSE SERPL-MCNC: 131 MG/DL (ref 65–99)
HCT VFR BLD AUTO: 41.8 % (ref 37–47)
HGB BLD-MCNC: 14 G/DL (ref 12–16)
MCH RBC QN AUTO: 29.9 PG (ref 27–33)
MCHC RBC AUTO-ENTMCNC: 33.5 G/DL (ref 33.6–35)
MCV RBC AUTO: 89.3 FL (ref 81.4–97.8)
PLATELET # BLD AUTO: 267 K/UL (ref 164–446)
PMV BLD AUTO: 9.6 FL (ref 9–12.9)
POTASSIUM SERPL-SCNC: 4.9 MMOL/L (ref 3.6–5.5)
PROT SERPL-MCNC: 6.5 G/DL (ref 6–8.2)
RBC # BLD AUTO: 4.68 M/UL (ref 4.2–5.4)
SODIUM SERPL-SCNC: 137 MMOL/L (ref 135–145)
WBC # BLD AUTO: 12.5 K/UL (ref 4.8–10.8)

## 2022-10-06 PROCEDURE — 85027 COMPLETE CBC AUTOMATED: CPT

## 2022-10-06 PROCEDURE — 96372 THER/PROPH/DIAG INJ SC/IM: CPT

## 2022-10-06 PROCEDURE — 700111 HCHG RX REV CODE 636 W/ 250 OVERRIDE (IP): Performed by: STUDENT IN AN ORGANIZED HEALTH CARE EDUCATION/TRAINING PROGRAM

## 2022-10-06 PROCEDURE — 700101 HCHG RX REV CODE 250: Performed by: STUDENT IN AN ORGANIZED HEALTH CARE EDUCATION/TRAINING PROGRAM

## 2022-10-06 PROCEDURE — 700102 HCHG RX REV CODE 250 W/ 637 OVERRIDE(OP): Performed by: STUDENT IN AN ORGANIZED HEALTH CARE EDUCATION/TRAINING PROGRAM

## 2022-10-06 PROCEDURE — A9270 NON-COVERED ITEM OR SERVICE: HCPCS | Performed by: STUDENT IN AN ORGANIZED HEALTH CARE EDUCATION/TRAINING PROGRAM

## 2022-10-06 PROCEDURE — G0378 HOSPITAL OBSERVATION PER HR: HCPCS

## 2022-10-06 PROCEDURE — 96376 TX/PRO/DX INJ SAME DRUG ADON: CPT

## 2022-10-06 PROCEDURE — 80053 COMPREHEN METABOLIC PANEL: CPT

## 2022-10-06 RX ADMIN — GABAPENTIN 300 MG: 300 CAPSULE ORAL at 05:17

## 2022-10-06 RX ADMIN — ACETAMINOPHEN 1000 MG: 10 INJECTION INTRAVENOUS at 01:19

## 2022-10-06 RX ADMIN — DEXAMETHASONE SODIUM PHOSPHATE 4 MG: 4 INJECTION, SOLUTION INTRA-ARTICULAR; INTRALESIONAL; INTRAMUSCULAR; INTRAVENOUS; SOFT TISSUE at 05:18

## 2022-10-06 RX ADMIN — ACETAMINOPHEN 1000 MG: 500 TABLET ORAL at 05:17

## 2022-10-06 RX ADMIN — ENOXAPARIN SODIUM 40 MG: 40 INJECTION SUBCUTANEOUS at 10:29

## 2022-10-06 RX ADMIN — ACETAMINOPHEN 1000 MG: 500 TABLET ORAL at 12:50

## 2022-10-06 RX ADMIN — FAMOTIDINE 20 MG: 10 INJECTION INTRAVENOUS at 05:18

## 2022-10-06 RX ADMIN — GABAPENTIN 300 MG: 300 CAPSULE ORAL at 12:50

## 2022-10-06 RX ADMIN — POTASSIUM CHLORIDE AND SODIUM CHLORIDE: 900; 150 INJECTION, SOLUTION INTRAVENOUS at 01:15

## 2022-10-06 RX ADMIN — DEXAMETHASONE SODIUM PHOSPHATE 4 MG: 4 INJECTION, SOLUTION INTRA-ARTICULAR; INTRALESIONAL; INTRAMUSCULAR; INTRAVENOUS; SOFT TISSUE at 12:49

## 2022-10-06 ASSESSMENT — ENCOUNTER SYMPTOMS
VOMITING: 0
CHILLS: 0
DIARRHEA: 0
FEVER: 0
PALPITATIONS: 0
HEARTBURN: 0
COUGH: 0
NAUSEA: 0
SHORTNESS OF BREATH: 0
ABDOMINAL PAIN: 1

## 2022-10-06 ASSESSMENT — PAIN DESCRIPTION - PAIN TYPE: TYPE: ACUTE PAIN

## 2022-10-06 NOTE — CARE PLAN
The patient is Stable - Low risk of patient condition declining or worsening    Shift Goals  Clinical Goals: increase urine output, pain control  Patient Goals: increase urine output, pain control  Family Goals: n/a    Progress made toward(s) clinical / shift goals:    Problem: Knowledge Deficit - Standard  Goal: Patient and family/care givers will demonstrate understanding of plan of care, disease process/condition, diagnostic tests and medications  Outcome: Progressing     Problem: Pain - Standard  Goal: Alleviation of pain or a reduction in pain to the patient’s comfort goal  Outcome: Progressing     Problem: Hemodynamics  Goal: Patient's hemodynamics, fluid balance and neurologic status will be stable or improve  Outcome: Progressing     Problem: Urinary Elimination  Goal: Establish and maintain regular urinary output  Outcome: Progressing       Patient is not progressing towards the following goals:

## 2022-10-06 NOTE — PROGRESS NOTES
Report received, assumed care, sitting up in bed, in stable condition, alert and orientated, call light within reach, fall precautions in place, bed in low position, pt stated her  will be in to walk with her in halls, no complaints will continue to monitor

## 2022-10-06 NOTE — DISCHARGE INSTRUCTIONS
Discharge Instructions    Discharged to home by car with relative. Discharged via wheelchair, hospital escort: Yes.  Special equipment needed: Not Applicable    Be sure to schedule a follow-up appointment with your primary care doctor or any specialists as instructed.     Discharge Plan:   Diet Plan: Discussed  Activity Level: Discussed  Confirmed Follow up Appointment: Patient to Call and Schedule Appointment  Confirmed Symptoms Management: Discussed  Medication Reconciliation Updated: Yes  Influenza Vaccine Indication: Patient Refuses    I understand that a diet low in cholesterol, fat, and sodium is recommended for good health. Unless I have been given specific instructions below for another diet, I accept this instruction as my diet prescription.   Other diet:      Special Instructions: None    -Is this patient being discharged with medication to prevent blood clots?  No    Is patient discharged on Warfarin / Coumadin?   No   Sleeve Gastrectomy, Care After  This sheet gives you information about how to care for yourself after your procedure. Your health care provider may also give you more specific instructions. If you have problems or questions, contact your health care provider.  What can I expect after the procedure?  After the procedure, it is common to have:  Pain in the abdomen.  Decreased appetite.  Clear fluid leaking through the small tube (drain) that comes from your incision site.  Follow these instructions at home:  Medicines  Take over-the-counter and prescription medicines only as told by your health care provider.  Do not drive for 24 hours if you were given a sedative during your procedure.  Do not drive or use heavy machinery while taking prescription pain medicine.  Incision and drain care    Follow instructions from your health care provider about how to take care of your incisions. Make sure you:  Wash your hands with soap and water before and after you change your bandage (dressing). If soap  and water are not available, use hand .  Change your dressing as told by your health care provider.  Leave stitches (sutures), skin glue, or adhesive strips in place. These skin closures may need to be in place for 2 weeks or longer. If adhesive strip edges start to loosen and curl up, you may trim the loose edges. Do not remove adhesive strips completely unless your health care provider tells you to do that.  Keep the area around your incisions and your drain clean and dry.  Check your incision areas every day for signs of infection. Check for:  Redness, swelling, or pain.  Fluid or blood.  Warmth.  Pus or a bad smell.  Empty your drain every day. Follow instructions from your health care provider about recording the amount of fluid that comes from your drain. Make note of any changes in the amount or appearance of the fluid.  Activity    Rest as told by your health care provider.  Avoid sitting for a long time without moving. Get up to take short walks every 1-2 hours. This is important to improve blood flow and breathing. Ask for help if you feel weak or unsteady.  Return to your normal activities as told by your health care provider. Ask your health care provider what activities are safe for you.  Do not lift anything that is heavier than 10 lb (4.5 kg), or the limit that you are told, until your health care provider says that it is safe.  Avoid intense physical activity for as long as told by your health care provider.  Eating and drinking  Follow instructions from your health care provider about eating or drinking restrictions. You will be given instructions about the type, the size, and the timing of your meals.  Keep track of any foods that cause discomfort, such as bloating or cramping.  Eat healthy foods. Avoid foods that are high in fat or sugar.  Stop eating when you feel full.  Take supplements only as told by your health care provider.  Drink enough fluid to keep your urine pale  yellow.  General instructions  Do not take baths, swim, or use a hot tub until your health care provider approves. Ask your health care provider if you may take showers. You may only be allowed to take sponge baths.  Do not use any products that contain nicotine or tobacco, such as cigarettes, e-cigarettes, and chewing tobacco. If you need help quitting, ask your health care provider.  Wear compression stockings as told by your health care provider. These stockings help to prevent blood clots and reduce swelling in your legs.  Do breathing exercises as told by your health care provider.  Keep all follow-up visits as told by your health care provider. This is important.  Contact a health care provider if you have:  Pain that gets worse or does not get better with medicine.  Redness, swelling, or pain around your incisions.  Fluid or blood coming from your incisions.  Incisions that feel warm to the touch.  Pus or a bad smell coming from your incisions.  A fever or chills.  Problems with your drain.  Green or bad-smelling fluid leaking from your drain.  Get help right away if you have:  Trouble breathing.  Severe pain, especially in your legs.  Summary  After the procedure, it is common to have pain in the abdomen, decreased appetite, and clear fluid leaking from the drain in an incision site.  Take over-the-counter and prescription medicines only as told by your health care provider.  Follow instructions from your health care provider about how to take care of your incisions. Report any signs of infection to your health care provider.  After surgery, get up to take short walks every 1-2 hours. This is important to improve blood flow and breathing. Follow instructions from your health care provider about eating or drinking restrictions.  Get help right away if you have trouble breathing or severe pain, especially in your legs.  This information is not intended to replace advice given to you by your health care  provider. Make sure you discuss any questions you have with your health care provider.  Document Released: 10/14/2010 Document Revised: 04/09/2020 Document Reviewed: 08/06/2019  Elsevier Patient Education © 2020 [employer]

## 2022-10-06 NOTE — DISCHARGE PLANNING
Case Management Discharge Planning    Admission Date: 10/5/2022  GMLOS:    ALOS: 0    6-Clicks ADL Score:    6-Clicks Mobility Score:        Anticipated Discharge Dispo: Discharge Disposition: Discharged to home/self care (01)  Discharge Address[address] [address] [address]  Discharge Contact Phone Number: [phone]    DME Needed: No    Action(s) Taken: Updated Provider/Nurse on Discharge Plan and OTHER: Chart reviewed and discharge plan updated. Discussed patients' treatment and discharge plans with medical and nursing teams during IDT rounds.     Escalations Completed: None    Medically Clear: Yes    Next Steps: f/u with medical and nursing teams regarding discharge planning needs/barriers and assist as indicated. f/u with patient and family regarding discharge planning needs/barriers and update discharge plan as indicated.    Barriers to Discharge: None    Is the patient up for discharge tomorrow: No, patient set to discharge home today (10/06/2022) with family.

## 2022-10-06 NOTE — CARE PLAN
The patient is Stable - Low risk of patient condition declining or worsening    Shift Goals  Clinical Goals: increase urine output, pain control  Patient Goals: increase urine output, pain control  Family Goals: n/a    Progress made toward(s) clinical / shift goals: Urinating qs, pain resolved.    Patient is not progressing towards the following goals:

## 2022-10-06 NOTE — PROGRESS NOTES
0715 received report from off-going shift. Awake/alert, denies pain. Assessment done. Cheerful and very knowledgeable about own disease process. Discussed DC plan for today. Ambulating well with little assist to bathroom.

## 2022-10-06 NOTE — PROGRESS NOTES
Surgical Progress Note    Author: Mine Busby P.A.-C. Date & Time created: 10/6/2022   10:01 AM     Interval Events:  56 year old female sp laparascopic sleeve gastrectomy. Tolerating clears without nausea/vomiting. Admits to typical incisional abdominal pain, controlled with medication. Pt is ambulating and voiding.     Review of Systems   Constitutional:  Negative for chills and fever.   Respiratory:  Negative for cough and shortness of breath.    Cardiovascular:  Negative for chest pain and palpitations.   Gastrointestinal:  Positive for abdominal pain (incisional). Negative for diarrhea, heartburn, nausea and vomiting.   Genitourinary:  Negative for dysuria.   Hemodynamics:  Temp (24hrs), Av.9 °C (98.4 °F), Min:36.3 °C (97.4 °F), Max:37.4 °C (99.4 °F)  Temperature: 37.3 °C (99.1 °F)  Pulse  Av  Min: 60  Max: 97   Blood Pressure: (!) 153/91     Respiratory:    Respiration: 18, Pulse Oximetry: 92 %           Neuro:  GCS       Fluids:    Intake/Output Summary (Last 24 hours) at 10/6/2022 1001  Last data filed at 10/6/2022 0537  Gross per 24 hour   Intake 1248.75 ml   Output 2185 ml   Net -936.25 ml     Weight: 109 kg (240 lb 4.8 oz)  Current Diet Order   Procedures    Diet Order Diet: Clear Liquid; Miscellaneous modifications: (optional): Bariatric     Physical Exam  Constitutional:       Appearance: Normal appearance. She is obese.   HENT:      Head: Normocephalic and atraumatic.      Nose: Nose normal.      Mouth/Throat:      Mouth: Mucous membranes are moist.   Eyes:      Conjunctiva/sclera: Conjunctivae normal.   Cardiovascular:      Rate and Rhythm: Normal rate and regular rhythm.   Pulmonary:      Effort: Pulmonary effort is normal. No respiratory distress.   Abdominal:      General: There is distension.      Palpations: Abdomen is soft.      Tenderness: There is abdominal tenderness. There is no guarding or rebound.   Musculoskeletal:         General: Normal range of motion.      Cervical  back: Normal range of motion.   Skin:     General: Skin is warm and dry.      Coloration: Skin is not jaundiced.      Findings: No erythema or rash.   Neurological:      Mental Status: She is alert and oriented to person, place, and time.   Psychiatric:         Mood and Affect: Mood normal.     Labs:  Recent Results (from the past 24 hour(s))   POCT glucose device results    Collection Time: 10/05/22 10:36 AM   Result Value Ref Range    POC Glucose, Blood 117 (H) 65 - 99 mg/dL   CBC without Differential (blood)    Collection Time: 10/06/22  2:46 AM   Result Value Ref Range    WBC 12.5 (H) 4.8 - 10.8 K/uL    RBC 4.68 4.20 - 5.40 M/uL    Hemoglobin 14.0 12.0 - 16.0 g/dL    Hematocrit 41.8 37.0 - 47.0 %    MCV 89.3 81.4 - 97.8 fL    MCH 29.9 27.0 - 33.0 pg    MCHC 33.5 (L) 33.6 - 35.0 g/dL    RDW 38.3 35.9 - 50.0 fL    Platelet Count 267 164 - 446 K/uL    MPV 9.6 9.0 - 12.9 fL   Comp Metabolic Panel (CMP)    Collection Time: 10/06/22  2:46 AM   Result Value Ref Range    Sodium 137 135 - 145 mmol/L    Potassium 4.9 3.6 - 5.5 mmol/L    Chloride 102 96 - 112 mmol/L    Co2 24 20 - 33 mmol/L    Anion Gap 11.0 7.0 - 16.0    Glucose 131 (H) 65 - 99 mg/dL    Bun 8 8 - 22 mg/dL    Creatinine 0.51 0.50 - 1.40 mg/dL    Calcium 9.5 8.5 - 10.5 mg/dL    AST(SGOT) 21 12 - 45 U/L    ALT(SGPT) 20 2 - 50 U/L    Alkaline Phosphatase 108 (H) 30 - 99 U/L    Total Bilirubin 0.3 0.1 - 1.5 mg/dL    Albumin 4.2 3.2 - 4.9 g/dL    Total Protein 6.5 6.0 - 8.2 g/dL    Globulin 2.3 1.9 - 3.5 g/dL    A-G Ratio 1.8 g/dL   ESTIMATED GFR    Collection Time: 10/06/22  2:46 AM   Result Value Ref Range    GFR (CKD-EPI) 110 >60 mL/min/1.73 m 2     Medical Decision Making, by Problem:  Active Hospital Problems    Diagnosis     S/P laparoscopic sleeve gastrectomy [Z98.84]      Plan:  Pt is alert and oriented, NAD. Breathing unlabored. Tolerating PO.  Incisions ok. VS stable. Labs reviewed.  Leukocytosis, likely reactive. Encouraged ambulation and incentive  spirometry.  Wean O2. Pt may need to stay another night for nausea, pain control, hypoxia, will see how the day progresses. Otherwise okay for discharge later this afternoon. Pt also seen and examined by Dr. Cano.     Quality Measures:  Quality-Core Measures   Reviewed items::  Labs reviewed  Nguyen catheter::  No Nguyen  DVT prophylaxis pharmacological::  Enoxaparin (Lovenox)  DVT prophylaxis - mechanical:  SCDs  Ulcer Prophylaxis::  Yes    Discussed patient condition with Patient and Dr. Cano

## 2022-10-25 ENCOUNTER — TELEPHONE (OUTPATIENT)
Dept: MEDICAL GROUP | Facility: MEDICAL CENTER | Age: 57
End: 2022-10-25
Payer: COMMERCIAL

## 2022-10-25 LAB — PATHOLOGY CONSULT NOTE: NORMAL

## 2022-10-25 NOTE — TELEPHONE ENCOUNTER
Called pt.  She has appt with Dr. Cano in 2 days  with plan to start triple therapy for h. Pylori at that time.  She is holding starting until then due to her recent surgery.    Since surgery, her BP has been reducing and is consistently about 110/62. No chest pains or palpitations but is slightly lightheaded, so we will reduce her lisinopril from 20mg daily to 10mg daily (she will cut her pills) until next visit (11/7/22), continue home monitoring and holding precautions for BP less than 90/60 given.  Patient expressed understanding and agreement with plan.    -Dr. Daly Haji

## 2022-11-04 NOTE — PROGRESS NOTES
Subjective:     CC: HTN follow-up    HPI:   Laura presents today with     HTN- patient had gastric sleeve surgery on 10/5/22.  Since her surgery, she has noticed her BP are reducing.  She is getting mild lightheadedness or dizziness of she stands up too fast.  No chest pains, palpitations, SOB.  She is taking 1/2 a tablet of her lisinopril for the last 2 weeks.  She is eating a soft diet and has lost 33lbs since her surgery.    H. Pylori- She is taking triple therapy (amoxicillin 500mg BID, clarithromycin 500mg BID, and omeprazole 20mg daily) per Dr. Cano, but has trouble swollowing her amoxicillin and clarithromycin due to foul flavor and is asking to have them in liquid form.  She has 9 days of abx treatment left.  She has follow-up labs in 3 weeks. No diarrhea.  She is eating yogurt twice daily.  No stomach upset, nausea or vomiting.    Past Medical History:   Diagnosis Date    Acne 04/16/2012    Arthritis 09/16/2022    Right elbow, right knee and right ankle    Breast cancer (HCC) 2021    Cancer (HCC) 09/16/2022    breast cancer    Dental disorder 09/16/2022    flipper    Dysuria 04/16/2012    Family history of colon cancer 04/16/2012    HTN (hypertension) 04/16/2012    Hypertension     Microcytic anemia 08/22/2012    Thrombosed external hemorrhoid 10/24/2012    Vitamin d deficiency 04/16/2012       Social History     Tobacco Use    Smoking status: Never    Smokeless tobacco: Never   Vaping Use    Vaping Use: Never used   Substance Use Topics    Alcohol use: Not Currently    Drug use: No       Current Outpatient Medications Ordered in Epic   Medication Sig Dispense Refill    lisinopril (PRINIVIL) 5 MG Tab Take 1 Tablet by mouth every day. To replace lisinopril 20mg, 1/2 tablet daily 90 Tablet 1    omeprazole (PRILOSEC) 20 MG delayed-release capsule Take 1 capsule by mouth twice daily for 9 days, then take 1 capsule daily 39 Capsule 0    amoxicillin (AMOXIL) 400 MG/5ML suspension Take 12.5 mL by mouth 2 times  "a day for 9 days. 225 mL 0    clarithromycin (BIAXIN) 250 MG/5ML Recon Susp Take 10 mL by mouth 2 times a day for 9 days. 180 mL 0    fluticasone (FLONASE) 50 MCG/ACT nasal spray Administer 1 Spray into affected nostril(S) every day. 16 g 0    anastrozole (ARIMIDEX) 1 MG Tab Take 1 Tablet by mouth every day.      Omega-3 Fatty Acids (FISH OIL PO) Take 1 Tablet by mouth every day.      multivitamin (THERAGRAN) Tab Take 1 Tablet by mouth every day.       No current [facility]-ordered facility-administered medications on file.       Allergies:  Sulfa drugs    Health Maintenance: declines flu shot and pneumonia shots.  [facility] recommended a her pharmacy.  Recommended COVID19 vaccine.    ROS:  Gen: no fevers/chills, no changes in weight  Pulm: no sob, no cough  CV: no chest pain, no palpitations  GI: no nausea/vomiting, no diarrhea      Objective:       Exam:  /60 (BP Location: Left arm, Patient Position: Sitting, BP Cuff Size: Large adult)   Pulse 87   Temp 36.1 °C (96.9 °F) (Temporal)   Resp 20   Ht 1.765 m (5' 9.5\")   Wt 100 kg (221 lb 3.7 oz)   LMP  (LMP Unknown) Comment: October 2020  SpO2 91%   BMI 32.20 kg/m²  Body mass index is 32.2 kg/m².    Gen: Alert and oriented, No apparent distress.  Neck: Neck is supple without lymphadenopathy.  Lungs: Normal effort, CTA bilaterally, no wheezes, rhonchi, or rales  CV: Regular rate and rhythm. No murmurs, rubs, or gallops.  Abd: Soft, nontender, nondistended.  Normoactive bowel sounds.  Ext: No clubbing, cyanosis, edema.      Labs: 10/5-6/22 labs reviewed.    Assessment & Plan:     57 y.o. female with the following -     1. Hypertension, unspecified type  Chronic, improved.  Continue to titrate down lisinopril from 10mg to 5mg daily.  Recommended holding lisinopril if BP less than 90/60.  Recommended restarting lisinopril 10mg if BP is consistently more than 140/90.  Follow-up precautions given.  - lisinopril (PRINIVIL) 5 MG Tab; Take 1 Tablet by mouth every day. " To replace lisinopril 20mg, 1/2 tablet daily  Dispense: 90 Tablet; Refill: 1  - Basic Metabolic Panel; Future    2. H. pylori infection  New issue, recurrent. She had similar infection dx more than 5 years ago and treated.  Recommended PPI BID instead of once daily and liquid versions of amoxicillin and clarithromycin given to replace tablets since she is not tolerating the flavor of the crushed tablets well.  Continue probiotic and follow-up precautions given.  - omeprazole (PRILOSEC) 20 MG delayed-release capsule; Take 1 capsule by mouth twice daily for 9 days, then take 1 capsule daily  Dispense: 39 Capsule; Refill: 0  - amoxicillin (AMOXIL) 400 MG/5ML suspension; Take 12.5 mL by mouth 2 times a day for 9 days.  Dispense: 225 mL; Refill: 0  - clarithromycin (BIAXIN) 250 MG/5ML Recon Susp; Take 10 mL by mouth 2 times a day for 9 days.  Dispense: 180 mL; Refill: 0    3. S/P laparoscopic sleeve gastrectomy  Requested records from Dr. Cano's office.  Pt to get follow-up labs for him in 3 weeks, but my follow-up BMP next week given change in lisinopril dose.        Return in about 6 weeks (around 12/19/2022) for HTN follow-up.    Please note that this dictation was created using voice recognition software. I have made every reasonable attempt to correct obvious errors, but I expect that there are errors of grammar and possibly content that I did not discover before finalizing the note.

## 2022-11-07 ENCOUNTER — OFFICE VISIT (OUTPATIENT)
Dept: MEDICAL GROUP | Facility: MEDICAL CENTER | Age: 57
End: 2022-11-07
Payer: COMMERCIAL

## 2022-11-07 VITALS
TEMPERATURE: 96.9 F | RESPIRATION RATE: 20 BRPM | HEART RATE: 87 BPM | SYSTOLIC BLOOD PRESSURE: 100 MMHG | HEIGHT: 70 IN | DIASTOLIC BLOOD PRESSURE: 60 MMHG | OXYGEN SATURATION: 91 % | WEIGHT: 221.23 LBS | BODY MASS INDEX: 31.67 KG/M2

## 2022-11-07 DIAGNOSIS — Z98.84 S/P LAPAROSCOPIC SLEEVE GASTRECTOMY: ICD-10-CM

## 2022-11-07 DIAGNOSIS — A04.8 H. PYLORI INFECTION: ICD-10-CM

## 2022-11-07 DIAGNOSIS — I10 HYPERTENSION, UNSPECIFIED TYPE: ICD-10-CM

## 2022-11-07 PROCEDURE — 99214 OFFICE O/P EST MOD 30 MIN: CPT | Performed by: FAMILY MEDICINE

## 2022-11-07 RX ORDER — LISINOPRIL 5 MG/1
5 TABLET ORAL DAILY
Qty: 90 TABLET | Refills: 1 | Status: SHIPPED
Start: 2022-11-07 | End: 2022-12-28

## 2022-11-07 RX ORDER — OMEPRAZOLE 20 MG/1
CAPSULE, DELAYED RELEASE ORAL
Qty: 39 CAPSULE | Refills: 0 | Status: SHIPPED | OUTPATIENT
Start: 2022-11-07 | End: 2022-12-28 | Stop reason: SDUPTHER

## 2022-11-07 RX ORDER — CLARITHROMYCIN 250 MG/5ML
500 FOR SUSPENSION ORAL 2 TIMES DAILY
Qty: 180 ML | Refills: 0 | Status: SHIPPED | OUTPATIENT
Start: 2022-11-07 | End: 2022-11-16

## 2022-11-07 RX ORDER — AMOXICILLIN 500 MG/1
1 CAPSULE ORAL 2 TIMES DAILY
COMMUNITY
Start: 2022-11-07 | End: 2022-11-07

## 2022-11-07 RX ORDER — CLARITHROMYCIN 500 MG/1
1 TABLET, COATED ORAL 2 TIMES DAILY
COMMUNITY
Start: 2022-11-02 | End: 2022-11-07

## 2022-11-07 RX ORDER — AMOXICILLIN 400 MG/5ML
1000 POWDER, FOR SUSPENSION ORAL 2 TIMES DAILY
Qty: 225 ML | Refills: 0 | Status: SHIPPED | OUTPATIENT
Start: 2022-11-07 | End: 2022-11-16

## 2022-11-07 RX ORDER — OMEPRAZOLE 20 MG/1
20 CAPSULE, DELAYED RELEASE ORAL DAILY
COMMUNITY
Start: 2022-10-06 | End: 2022-11-07 | Stop reason: SDUPTHER

## 2022-11-07 ASSESSMENT — FIBROSIS 4 INDEX: FIB4 SCORE: 0.97

## 2022-11-07 NOTE — LETTER
[facility]  Daly Haji D.O.  [address]  [place] [address]  Fax: [phone]   Authorization for Release/Disclosure of   Protected Health Information   Name: LAURA CANO : 1965 SSN: [id number]   Address: [address] [place] [address]  [place] [address] Phone:    [phone] (home)    I authorize the entity listed below to release/disclose the PHI below to:   [facility]/Daly Haji D.O. and Daly Haji D.O.   Provider or Entity Name:     Address   City, State, Zip   Phone:    Fax:   Reason for request: continuity of care   Information to be released:    [  ] LAST COLONOSCOPY,  including any PATH REPORT and follow-up  [  ] LAST FIT/COLOGUARD RESULT [  ] LAST DEXA  [  ] LAST MAMMOGRAM  [  ] LAST PAP  [  ] LAST LABS [  ] RETINA EXAM REPORT  [  ] IMMUNIZATION RECORDS  [  ] Release all info      [  ] Check here and initial the line next to each item to release ALL health information INCLUDING  _____ Care and treatment for drug and / or alcohol abuse  _____ HIV testing, infection status, or AIDS  _____ Genetic Testing    DATES OF SERVICE OR TIME PERIOD TO BE DISCLOSED: _____________  I understand and acknowledge that:  * This Authorization may be revoked at any time by you in writing, except if your health information has already been used or disclosed.  * Your health information that will be used or disclosed as a result of you signing this authorization could be re-disclosed by the recipient. If this occurs, your re-disclosed health information may no longer be protected by State or Federal laws.  * You may refuse to sign this Authorization. Your refusal will not affect your ability to obtain treatment.  * This Authorization becomes effective upon signing and will  on (date) __________.      If no date is indicated, this Authorization will  one (1) year from the signature date.    Name: Laura Cano    Signature:   Date:            PLEASE FAX REQUESTED  RECORDS BACK TO: [phone]

## 2022-11-08 ENCOUNTER — TELEPHONE (OUTPATIENT)
Dept: MEDICAL GROUP | Facility: MEDICAL CENTER | Age: 57
End: 2022-11-08
Payer: COMMERCIAL

## 2022-11-08 NOTE — TELEPHONE ENCOUNTER
----- Message from Daly Haji D.O. sent at 11/7/2022  6:45 PM PST -----  Regarding: RE: Out of amoxicillin  Amoxicillin is the drug of choice to treat her infection.  Is there a different pharmacy she would like me to send the amoxicillin Rx to?    -Dr. Haji  ----- Message -----  From: Farzana Sapp, Med Ass't  Sent: 11/7/2022   5:54 PM PST  To: Daly Haji D.O., #  Subject: Out of amoxicillin                               Pharmacy [facility] left a mess stating that they're out of amoxicillin.  They asked if you'd change pt's rx?

## 2022-11-22 ENCOUNTER — HOSPITAL ENCOUNTER (OUTPATIENT)
Dept: LAB | Facility: MEDICAL CENTER | Age: 57
End: 2022-11-22
Attending: FAMILY MEDICINE
Payer: COMMERCIAL

## 2022-11-22 DIAGNOSIS — I10 HYPERTENSION, UNSPECIFIED TYPE: ICD-10-CM

## 2022-11-22 LAB
ANION GAP SERPL CALC-SCNC: 11 MMOL/L (ref 7–16)
BUN SERPL-MCNC: 14 MG/DL (ref 8–22)
CALCIUM SERPL-MCNC: 9.7 MG/DL (ref 8.4–10.2)
CHLORIDE SERPL-SCNC: 105 MMOL/L (ref 96–112)
CO2 SERPL-SCNC: 22 MMOL/L (ref 20–33)
CREAT SERPL-MCNC: 0.6 MG/DL (ref 0.5–1.4)
FASTING STATUS PATIENT QL REPORTED: NORMAL
GFR SERPLBLD CREATININE-BSD FMLA CKD-EPI: 106 ML/MIN/1.73 M 2
GLUCOSE SERPL-MCNC: 112 MG/DL (ref 65–99)
POTASSIUM SERPL-SCNC: 3.6 MMOL/L (ref 3.6–5.5)
SODIUM SERPL-SCNC: 138 MMOL/L (ref 135–145)

## 2022-11-22 PROCEDURE — 80048 BASIC METABOLIC PNL TOTAL CA: CPT

## 2022-11-22 PROCEDURE — 36415 COLL VENOUS BLD VENIPUNCTURE: CPT

## 2022-12-13 ENCOUNTER — HOSPITAL ENCOUNTER (OUTPATIENT)
Dept: LAB | Facility: MEDICAL CENTER | Age: 57
End: 2022-12-13
Attending: COLON & RECTAL SURGERY
Payer: COMMERCIAL

## 2022-12-13 LAB
25(OH)D3 SERPL-MCNC: 40 NG/ML (ref 30–100)
ALBUMIN SERPL BCP-MCNC: 4.2 G/DL (ref 3.2–4.9)
ALBUMIN/GLOB SERPL: 1.6 G/DL
ALP SERPL-CCNC: 96 U/L (ref 30–99)
ALT SERPL-CCNC: 11 U/L (ref 2–50)
ANION GAP SERPL CALC-SCNC: 9 MMOL/L (ref 7–16)
AST SERPL-CCNC: 15 U/L (ref 12–45)
BASOPHILS # BLD AUTO: 1.2 % (ref 0–1.8)
BASOPHILS # BLD: 0.06 K/UL (ref 0–0.12)
BILIRUB SERPL-MCNC: 0.3 MG/DL (ref 0.1–1.5)
BUN SERPL-MCNC: 13 MG/DL (ref 8–22)
CALCIUM ALBUM COR SERPL-MCNC: 9.5 MG/DL (ref 8.5–10.5)
CALCIUM SERPL-MCNC: 9.7 MG/DL (ref 8.4–10.2)
CHLORIDE SERPL-SCNC: 107 MMOL/L (ref 96–112)
CHOLEST SERPL-MCNC: 190 MG/DL (ref 100–199)
CO2 SERPL-SCNC: 25 MMOL/L (ref 20–33)
CREAT SERPL-MCNC: 0.55 MG/DL (ref 0.5–1.4)
EOSINOPHIL # BLD AUTO: 0.19 K/UL (ref 0–0.51)
EOSINOPHIL NFR BLD: 3.7 % (ref 0–6.9)
ERYTHROCYTE [DISTWIDTH] IN BLOOD BY AUTOMATED COUNT: 42.2 FL (ref 35.9–50)
FASTING STATUS PATIENT QL REPORTED: NORMAL
FERRITIN SERPL-MCNC: 220 NG/ML (ref 10–291)
FOLATE SERPL-MCNC: 18.9 NG/ML
GFR SERPLBLD CREATININE-BSD FMLA CKD-EPI: 108 ML/MIN/1.73 M 2
GLOBULIN SER CALC-MCNC: 2.7 G/DL (ref 1.9–3.5)
GLUCOSE SERPL-MCNC: 94 MG/DL (ref 65–99)
HCT VFR BLD AUTO: 42.5 % (ref 37–47)
HDLC SERPL-MCNC: 49 MG/DL
HGB BLD-MCNC: 14.1 G/DL (ref 12–16)
IMM GRANULOCYTES # BLD AUTO: 0.01 K/UL (ref 0–0.11)
IMM GRANULOCYTES NFR BLD AUTO: 0.2 % (ref 0–0.9)
IRON SERPL-MCNC: 89 UG/DL (ref 40–170)
LDLC SERPL CALC-MCNC: 123 MG/DL
LYMPHOCYTES # BLD AUTO: 1.49 K/UL (ref 1–4.8)
LYMPHOCYTES NFR BLD: 29.3 % (ref 22–41)
MCH RBC QN AUTO: 29.3 PG (ref 27–33)
MCHC RBC AUTO-ENTMCNC: 33.2 G/DL (ref 33.6–35)
MCV RBC AUTO: 88.2 FL (ref 81.4–97.8)
MONOCYTES # BLD AUTO: 0.3 K/UL (ref 0–0.85)
MONOCYTES NFR BLD AUTO: 5.9 % (ref 0–13.4)
NEUTROPHILS # BLD AUTO: 3.03 K/UL (ref 2–7.15)
NEUTROPHILS NFR BLD: 59.7 % (ref 44–72)
NRBC # BLD AUTO: 0 K/UL
NRBC BLD-RTO: 0 /100 WBC
PLATELET # BLD AUTO: 250 K/UL (ref 164–446)
PMV BLD AUTO: 10.3 FL (ref 9–12.9)
POTASSIUM SERPL-SCNC: 4 MMOL/L (ref 3.6–5.5)
PREALB SERPL-MCNC: 20.3 MG/DL (ref 18–38)
PROT SERPL-MCNC: 6.9 G/DL (ref 6–8.2)
RBC # BLD AUTO: 4.82 M/UL (ref 4.2–5.4)
SODIUM SERPL-SCNC: 141 MMOL/L (ref 135–145)
TRANSFERRIN SERPL-MCNC: 211 MG/DL (ref 200–370)
TRIGL SERPL-MCNC: 91 MG/DL (ref 0–149)
VIT B12 SERPL-MCNC: 729 PG/ML (ref 211–911)
WBC # BLD AUTO: 5.1 K/UL (ref 4.8–10.8)

## 2022-12-13 PROCEDURE — 82728 ASSAY OF FERRITIN: CPT

## 2022-12-13 PROCEDURE — 84252 ASSAY OF VITAMIN B-2: CPT

## 2022-12-13 PROCEDURE — 82746 ASSAY OF FOLIC ACID SERUM: CPT

## 2022-12-13 PROCEDURE — 80053 COMPREHEN METABOLIC PANEL: CPT

## 2022-12-13 PROCEDURE — 84134 ASSAY OF PREALBUMIN: CPT

## 2022-12-13 PROCEDURE — 80061 LIPID PANEL: CPT

## 2022-12-13 PROCEDURE — 82306 VITAMIN D 25 HYDROXY: CPT

## 2022-12-13 PROCEDURE — 36415 COLL VENOUS BLD VENIPUNCTURE: CPT

## 2022-12-13 PROCEDURE — 84630 ASSAY OF ZINC: CPT

## 2022-12-13 PROCEDURE — 83540 ASSAY OF IRON: CPT

## 2022-12-13 PROCEDURE — 85025 COMPLETE CBC W/AUTO DIFF WBC: CPT

## 2022-12-13 PROCEDURE — 84207 ASSAY OF VITAMIN B-6: CPT

## 2022-12-13 PROCEDURE — 84466 ASSAY OF TRANSFERRIN: CPT

## 2022-12-13 PROCEDURE — 82607 VITAMIN B-12: CPT

## 2022-12-13 PROCEDURE — 84425 ASSAY OF VITAMIN B-1: CPT

## 2022-12-16 LAB — ZINC SERPL-MCNC: 74.1 UG/DL (ref 60–120)

## 2022-12-18 LAB
VIT B1 BLD-MCNC: 124 NMOL/L (ref 70–180)
VIT B2 SERPL-SCNC: 18 NMOL/L (ref 5–50)

## 2022-12-20 LAB — VIT B6 SERPL-MCNC: 189.1 NMOL/L (ref 20–125)

## 2022-12-28 ENCOUNTER — OFFICE VISIT (OUTPATIENT)
Dept: MEDICAL GROUP | Facility: MEDICAL CENTER | Age: 57
End: 2022-12-28
Payer: COMMERCIAL

## 2022-12-28 VITALS
TEMPERATURE: 98.1 F | HEIGHT: 70 IN | HEART RATE: 74 BPM | DIASTOLIC BLOOD PRESSURE: 60 MMHG | WEIGHT: 206.46 LBS | BODY MASS INDEX: 29.56 KG/M2 | OXYGEN SATURATION: 98 % | SYSTOLIC BLOOD PRESSURE: 112 MMHG

## 2022-12-28 DIAGNOSIS — E78.49 OTHER HYPERLIPIDEMIA: ICD-10-CM

## 2022-12-28 DIAGNOSIS — K21.9 GASTROESOPHAGEAL REFLUX DISEASE, UNSPECIFIED WHETHER ESOPHAGITIS PRESENT: ICD-10-CM

## 2022-12-28 DIAGNOSIS — A04.8 H. PYLORI INFECTION: ICD-10-CM

## 2022-12-28 DIAGNOSIS — M19.90 ARTHRITIS: ICD-10-CM

## 2022-12-28 DIAGNOSIS — Z98.84 S/P LAPAROSCOPIC SLEEVE GASTRECTOMY: ICD-10-CM

## 2022-12-28 DIAGNOSIS — C50.911 INFILTRATING DUCTAL CARCINOMA OF RIGHT BREAST (HCC): ICD-10-CM

## 2022-12-28 DIAGNOSIS — I10 HYPERTENSION, UNSPECIFIED TYPE: ICD-10-CM

## 2022-12-28 PROCEDURE — 99214 OFFICE O/P EST MOD 30 MIN: CPT | Performed by: FAMILY MEDICINE

## 2022-12-28 RX ORDER — OMEPRAZOLE 20 MG/1
20 CAPSULE, DELAYED RELEASE ORAL DAILY
Qty: 90 CAPSULE | Refills: 1 | Status: SHIPPED | OUTPATIENT
Start: 2022-12-28

## 2022-12-28 ASSESSMENT — FIBROSIS 4 INDEX: FIB4 SCORE: 0.99

## 2022-12-28 NOTE — ASSESSMENT & PLAN NOTE
She has been eating more protein recently due to her recent gastric surgery.  Pt plans to replace 2 meat meals with protein from plants.

## 2022-12-28 NOTE — PROGRESS NOTES
Subjective:     CC: HTN follow-up    HPI:   Laura presents today with       HTN (hypertension)  Chronic issue.  No chest pain, palpitations, SOB, lightheaded or dizziness.  She is takign lisinopril 5mg daily. Not checking BP at home.  She has a BP cuff at home.  She plans to start yoga soon.  She is eating less salt than prior to surgery.  She is eating protein first and then carbohydrates and eats about 4oz per sitting.    Infiltrating ductal carcinoma of right breast (HCC)  Patient would like to establish with a new oncologist due to wanting to be with an oncologist in a smaller group.  She is actively working to lose weight to help reduce her risk for recurrence of cancer.  She would like to possibly stop anastrozole due to worsening hand arthritis since starting the anastrozole.    She is also getting odd discomfort/numnbess of her left hip and left ankle and hopes to have yoga help improves these.    Other hyperlipidemia  She has been eating more protein recently due to her recent gastric surgery.  Pt plans to replace 2 meat meals with protein from plants.        Past Medical History:   Diagnosis Date    Acne 04/16/2012    Arthritis 09/16/2022    Right elbow, right knee and right ankle    Breast cancer (HCC) 2021    Cancer (HCC) 09/16/2022    breast cancer    Dental disorder 09/16/2022    flipper    Dysuria 04/16/2012    Family history of colon cancer 04/16/2012    HTN (hypertension) 04/16/2012    Hypertension     Microcytic anemia 08/22/2012    Thrombosed external hemorrhoid 10/24/2012    Vitamin d deficiency 04/16/2012       Social History     Tobacco Use    Smoking status: Never    Smokeless tobacco: Never   Vaping Use    Vaping Use: Never used   Substance Use Topics    Alcohol use: Not Currently    Drug use: No       Current Outpatient Medications Ordered in Epic   Medication Sig Dispense Refill    BIOTIN PO Take 1 Patch by mouth every day.      NON SPECIFIED Take 1 Dose by mouth per provider order.  "Athletic greens with 4oz of water      omeprazole (PRILOSEC) 20 MG delayed-release capsule Take 1 Capsule by mouth every day. 90 Capsule 1    fluticasone (FLONASE) 50 MCG/ACT nasal spray Administer 1 Spray into affected nostril(S) every day. 16 g 0    anastrozole (ARIMIDEX) 1 MG Tab Take 1 Tablet by mouth every day.       No current Epic-ordered facility-administered medications on file.       Allergies:  Sulfa drugs    Health Maintenance: Vaccines recommended and declined.    ROS:  Gen: no fevers/chills, +purposeful changes in weight  Pulm: no sob, no cough  CV: no chest pain, no palpitations  GI: no nausea/vomiting, no diarrhea      Objective:       Exam:  /60 (BP Location: Left arm, Patient Position: Sitting, BP Cuff Size: Adult long)   Pulse 74   Temp 36.7 °C (98.1 °F) (Temporal)   Ht 1.765 m (5' 9.5\")   Wt 93.6 kg (206 lb 7.4 oz)   LMP  (LMP Unknown) Comment: October 2020  SpO2 98%   BMI 30.05 kg/m²  Body mass index is 30.05 kg/m².    Gen: Alert and oriented, No apparent distress.  Neck: Neck is supple without lymphadenopathy.  Lungs: Normal effort, CTA bilaterally, no wheezes, rhonchi, or rales  CV: Regular rate and rhythm. No murmurs, rubs, or gallops.  Ext: No clubbing, cyanosis, edema.  +right 3rd finger DIP and PIP tenderness but no joint swelling or deformity.  Mild restriction in ROM and strength.  Otherwise, no hand discomfort or deformities or swelling    Labs: 12/13/22 labs reviewed    Assessment & Plan:     57 y.o. female with the following -     1. Hypertension, unspecified type  Chronic, well controlled.  Given improvement with weight loss and asymptomatic, will hold lisinopril and pt to monitor home Bps daily.  If Bp is consistently more than 140/90, then patient to restart lisinopril. Patient expressed understanding and agreement with plan.    2. Infiltrating ductal carcinoma of right breast (HCC)  Chronic, asymptomatic.  She is s/p radiation and surgery and is currently taking " anastrozole which is triggering arthritis pain.  She would like to establish with a new oncologist.  Referral given.  - Referral to Hematology Oncology    3. H. pylori infection  Noted at time of recent gastric sleeve, asymptomatic s/p treatment.  Pt states Dr. Cano's office will be obtaining follow-up testing to ensure resolution.  Will monitor.    4. S/P laparoscopic sleeve gastrectomy  Chronic, weight is improving well and recent labs without nutrient deficiency noted.  Continue oral supplement and diet adjustments per Dr. Cano's office.    5. Gastroesophageal reflux disease, unspecified whether esophagitis present  Chronic, intermittent issue after eating.  Pt to eat small meals regularly, eat GERD prevention diet and continue omeprazole daily.  Continue vitamin supplementation.  - omeprazole (PRILOSEC) 20 MG delayed-release capsule; Take 1 Capsule by mouth every day.  Dispense: 90 Capsule; Refill: 1    6. Other hyperlipidemia  Chronic, worsened on recent labs, asymptomatic. The 10-year ASCVD risk score (Orly DK, et al., 2019) is: 1.6%.  Dietary and exercise guidance given.    7. Arthritis  Chronic issue of her right hand with similar symptoms noted of her left hip and left ankle.  Given multiple recent xrays, we are deferring imaging at this time.  Pt to establish with a new oncologist to discuss possibly stopping anastrozole given symptoms started after starting anastrozole.  Pt to start yoga. She declines need for medication at this time for pain.  Reminded her to avoid any nsaids given her gastric surgery. Follow-up precautions given.          Return in about 3 months (around 3/28/2023) for HTN follow-up.    Please note that this dictation was created using voice recognition software. I have made every reasonable attempt to correct obvious errors, but I expect that there are errors of grammar and possibly content that I did not discover before finalizing the note.

## 2022-12-28 NOTE — ASSESSMENT & PLAN NOTE
Patient would like to establish with a new oncologist due to wanting to be with an oncologist in a smaller group.  She is actively working to lose weight to help reduce her risk for recurrence of cancer.  She would like to possibly stop anastrozole due to worsening hand arthritis since starting the anastrozole.    She is also getting odd discomfort/numnbess of her left hip and left ankle and hopes to have yoga help improves these.

## 2022-12-28 NOTE — ASSESSMENT & PLAN NOTE
Chronic issue.  No chest pain, palpitations, SOB, lightheaded or dizziness.  She is takign lisinopril 5mg daily. Not checking BP at home.  She has a BP cuff at home.  She plans to start yoga soon.  She is eating less salt than prior to surgery.  She is eating protein first and then carbohydrates and eats about 4oz per sitting.

## 2023-01-25 ENCOUNTER — TELEPHONE (OUTPATIENT)
Dept: HEMATOLOGY ONCOLOGY | Facility: MEDICAL CENTER | Age: 58
End: 2023-01-25

## 2023-01-25 NOTE — TELEPHONE ENCOUNTER
"Attempted to contact patient regarding missed appointment (yesterday).    \"Call cannot be completed at this time\".    "

## 2023-03-07 ENCOUNTER — HOSPITAL ENCOUNTER (OUTPATIENT)
Dept: LAB | Facility: MEDICAL CENTER | Age: 58
End: 2023-03-07
Attending: COLON & RECTAL SURGERY
Payer: COMMERCIAL

## 2023-03-07 LAB
25(OH)D3 SERPL-MCNC: 33 NG/ML (ref 30–100)
ALBUMIN SERPL BCP-MCNC: 4.1 G/DL (ref 3.2–4.9)
ALBUMIN/GLOB SERPL: 1.5 G/DL
ALP SERPL-CCNC: 91 U/L (ref 30–99)
ALT SERPL-CCNC: 12 U/L (ref 2–50)
ANION GAP SERPL CALC-SCNC: 9 MMOL/L (ref 7–16)
AST SERPL-CCNC: 17 U/L (ref 12–45)
BASOPHILS # BLD AUTO: 0.9 % (ref 0–1.8)
BASOPHILS # BLD: 0.05 K/UL (ref 0–0.12)
BILIRUB SERPL-MCNC: 0.3 MG/DL (ref 0.1–1.5)
BUN SERPL-MCNC: 13 MG/DL (ref 8–22)
CALCIUM ALBUM COR SERPL-MCNC: 9.3 MG/DL (ref 8.5–10.5)
CALCIUM SERPL-MCNC: 9.4 MG/DL (ref 8.4–10.2)
CHLORIDE SERPL-SCNC: 106 MMOL/L (ref 96–112)
CHOLEST SERPL-MCNC: 202 MG/DL (ref 100–199)
CO2 SERPL-SCNC: 26 MMOL/L (ref 20–33)
CREAT SERPL-MCNC: 0.65 MG/DL (ref 0.5–1.4)
EOSINOPHIL # BLD AUTO: 0.2 K/UL (ref 0–0.51)
EOSINOPHIL NFR BLD: 3.5 % (ref 0–6.9)
ERYTHROCYTE [DISTWIDTH] IN BLOOD BY AUTOMATED COUNT: 41.7 FL (ref 35.9–50)
FASTING STATUS PATIENT QL REPORTED: NORMAL
FERRITIN SERPL-MCNC: 147 NG/ML (ref 10–291)
FOLATE SERPL-MCNC: 17.3 NG/ML
GFR SERPLBLD CREATININE-BSD FMLA CKD-EPI: 104 ML/MIN/1.73 M 2
GLOBULIN SER CALC-MCNC: 2.7 G/DL (ref 1.9–3.5)
GLUCOSE SERPL-MCNC: 88 MG/DL (ref 65–99)
HCT VFR BLD AUTO: 44.4 % (ref 37–47)
HDLC SERPL-MCNC: 66 MG/DL
HGB BLD-MCNC: 14.6 G/DL (ref 12–16)
IMM GRANULOCYTES # BLD AUTO: 0.01 K/UL (ref 0–0.11)
IMM GRANULOCYTES NFR BLD AUTO: 0.2 % (ref 0–0.9)
IRON SERPL-MCNC: 104 UG/DL (ref 40–170)
LDLC SERPL CALC-MCNC: 116 MG/DL
LYMPHOCYTES # BLD AUTO: 1.72 K/UL (ref 1–4.8)
LYMPHOCYTES NFR BLD: 30.1 % (ref 22–41)
MCH RBC QN AUTO: 29.4 PG (ref 27–33)
MCHC RBC AUTO-ENTMCNC: 32.9 G/DL (ref 33.6–35)
MCV RBC AUTO: 89.3 FL (ref 81.4–97.8)
MONOCYTES # BLD AUTO: 0.33 K/UL (ref 0–0.85)
MONOCYTES NFR BLD AUTO: 5.8 % (ref 0–13.4)
NEUTROPHILS # BLD AUTO: 3.41 K/UL (ref 2–7.15)
NEUTROPHILS NFR BLD: 59.5 % (ref 44–72)
NRBC # BLD AUTO: 0 K/UL
NRBC BLD-RTO: 0 /100 WBC
PLATELET # BLD AUTO: 231 K/UL (ref 164–446)
PMV BLD AUTO: 9.5 FL (ref 9–12.9)
POTASSIUM SERPL-SCNC: 4.3 MMOL/L (ref 3.6–5.5)
PREALB SERPL-MCNC: 21.8 MG/DL (ref 18–38)
PROT SERPL-MCNC: 6.8 G/DL (ref 6–8.2)
RBC # BLD AUTO: 4.97 M/UL (ref 4.2–5.4)
SODIUM SERPL-SCNC: 141 MMOL/L (ref 135–145)
TRANSFERRIN SERPL-MCNC: 222 MG/DL (ref 200–370)
TRIGL SERPL-MCNC: 101 MG/DL (ref 0–149)
VIT B12 SERPL-MCNC: 553 PG/ML (ref 211–911)
WBC # BLD AUTO: 5.7 K/UL (ref 4.8–10.8)

## 2023-03-07 PROCEDURE — 84207 ASSAY OF VITAMIN B-6: CPT

## 2023-03-07 PROCEDURE — 36415 COLL VENOUS BLD VENIPUNCTURE: CPT

## 2023-03-07 PROCEDURE — 82607 VITAMIN B-12: CPT

## 2023-03-07 PROCEDURE — 84252 ASSAY OF VITAMIN B-2: CPT

## 2023-03-07 PROCEDURE — 84466 ASSAY OF TRANSFERRIN: CPT

## 2023-03-07 PROCEDURE — 84425 ASSAY OF VITAMIN B-1: CPT

## 2023-03-07 PROCEDURE — 82728 ASSAY OF FERRITIN: CPT

## 2023-03-07 PROCEDURE — 84134 ASSAY OF PREALBUMIN: CPT

## 2023-03-07 PROCEDURE — 80053 COMPREHEN METABOLIC PANEL: CPT

## 2023-03-07 PROCEDURE — 83013 H PYLORI (C-13) BREATH: CPT

## 2023-03-07 PROCEDURE — 80061 LIPID PANEL: CPT

## 2023-03-07 PROCEDURE — 82746 ASSAY OF FOLIC ACID SERUM: CPT

## 2023-03-07 PROCEDURE — 83540 ASSAY OF IRON: CPT

## 2023-03-07 PROCEDURE — 84630 ASSAY OF ZINC: CPT

## 2023-03-07 PROCEDURE — 85025 COMPLETE CBC W/AUTO DIFF WBC: CPT

## 2023-03-07 PROCEDURE — 82306 VITAMIN D 25 HYDROXY: CPT

## 2023-03-08 LAB
UREA BREATH TEST QL: NEGATIVE
ZINC SERPL-MCNC: 83.6 UG/DL (ref 60–120)

## 2023-03-11 LAB — VIT B1 BLD-MCNC: 106 NMOL/L (ref 70–180)

## 2023-03-12 LAB — VIT B2 SERPL-SCNC: 11 NMOL/L (ref 5–50)

## 2023-03-13 LAB — VIT B6 SERPL-MCNC: 98.9 NMOL/L (ref 20–125)

## 2023-03-24 ENCOUNTER — PATIENT MESSAGE (OUTPATIENT)
Dept: MEDICAL GROUP | Facility: MEDICAL CENTER | Age: 58
End: 2023-03-24
Payer: COMMERCIAL

## 2023-03-28 ENCOUNTER — HOSPITAL ENCOUNTER (OUTPATIENT)
Dept: RADIOLOGY | Facility: MEDICAL CENTER | Age: 58
End: 2023-03-28
Attending: FAMILY MEDICINE
Payer: COMMERCIAL

## 2023-03-28 ENCOUNTER — OFFICE VISIT (OUTPATIENT)
Dept: MEDICAL GROUP | Facility: MEDICAL CENTER | Age: 58
End: 2023-03-28
Payer: COMMERCIAL

## 2023-03-28 VITALS
HEIGHT: 70 IN | WEIGHT: 197.86 LBS | TEMPERATURE: 96.9 F | DIASTOLIC BLOOD PRESSURE: 82 MMHG | HEART RATE: 69 BPM | SYSTOLIC BLOOD PRESSURE: 130 MMHG | OXYGEN SATURATION: 93 % | RESPIRATION RATE: 18 BRPM | BODY MASS INDEX: 28.33 KG/M2

## 2023-03-28 DIAGNOSIS — G44.89 OTHER HEADACHE SYNDROME: ICD-10-CM

## 2023-03-28 DIAGNOSIS — M21.962 DEFORMITY OF LEFT FOOT: ICD-10-CM

## 2023-03-28 DIAGNOSIS — I10 HYPERTENSION, UNSPECIFIED TYPE: ICD-10-CM

## 2023-03-28 PROCEDURE — 99214 OFFICE O/P EST MOD 30 MIN: CPT | Performed by: FAMILY MEDICINE

## 2023-03-28 PROCEDURE — 76882 US LMTD JT/FCL EVL NVASC XTR: CPT | Mod: LT

## 2023-03-28 PROCEDURE — 73630 X-RAY EXAM OF FOOT: CPT | Mod: LT

## 2023-03-28 RX ORDER — OMEPRAZOLE 20 MG/1
CAPSULE, DELAYED RELEASE ORAL
COMMUNITY
Start: 2022-09-19 | End: 2023-03-28

## 2023-03-28 RX ORDER — ENOXAPARIN SODIUM 100 MG/ML
INJECTION SUBCUTANEOUS
COMMUNITY
Start: 2022-09-19 | End: 2023-03-28

## 2023-03-28 RX ORDER — ONDANSETRON 4 MG/1
TABLET, ORALLY DISINTEGRATING ORAL
COMMUNITY
Start: 2022-09-19 | End: 2023-03-28

## 2023-03-28 RX ORDER — CLARITHROMYCIN 500 MG/1
TABLET, COATED ORAL
COMMUNITY
Start: 2022-10-27 | End: 2023-03-28

## 2023-03-28 RX ORDER — LISINOPRIL 5 MG/1
5 TABLET ORAL DAILY
Qty: 30 TABLET | Refills: 0 | Status: SHIPPED | OUTPATIENT
Start: 2023-03-28 | End: 2023-05-26

## 2023-03-28 RX ORDER — LISINOPRIL 5 MG/1
TABLET ORAL
COMMUNITY
Start: 2023-03-06 | End: 2023-03-28

## 2023-03-28 RX ORDER — AMOXICILLIN 500 MG/1
1 CAPSULE ORAL
COMMUNITY
Start: 2022-10-27 | End: 2023-03-28

## 2023-03-28 RX ORDER — SCOLOPAMINE TRANSDERMAL SYSTEM 1 MG/1
PATCH, EXTENDED RELEASE TRANSDERMAL
COMMUNITY
Start: 2022-09-19 | End: 2023-03-28

## 2023-03-28 RX ORDER — CHLORHEXIDINE GLUCONATE 213 G/1000ML
SOLUTION TOPICAL
COMMUNITY
Start: 2022-09-19 | End: 2023-03-28

## 2023-03-28 RX ORDER — FLUTICASONE PROPIONATE 50 MCG
1 SPRAY, SUSPENSION (ML) NASAL DAILY
Qty: 16 G | Refills: 0 | Status: SHIPPED | OUTPATIENT
Start: 2023-03-28

## 2023-03-28 RX ORDER — LISINOPRIL 5 MG/1
TABLET ORAL
COMMUNITY
End: 2023-03-28 | Stop reason: SDUPTHER

## 2023-03-28 ASSESSMENT — PATIENT HEALTH QUESTIONNAIRE - PHQ9: CLINICAL INTERPRETATION OF PHQ2 SCORE: 0

## 2023-03-28 ASSESSMENT — FIBROSIS 4 INDEX: FIB4 SCORE: 1.17

## 2023-03-28 NOTE — ASSESSMENT & PLAN NOTE
Chronic, intermittent issue. She feels fluid sensation at times in her head.  This discomfort resolves with flonase.  She uses flonase prn if symptoms start to occur.  No current discomfort.

## 2023-03-28 NOTE — PROGRESS NOTES
Subjective:     CC: hard lump on foot    HPI:   Laura presents today with     Foot deformity- noticed a hard lump on the lateral side of her left foot a couple days ago.  It is fixed, attached to the bone.  No known injuries. No redness or rashes.  No itching, pain. Given her hx of breast cancer, she is concerned re: cause of the mass    HTN (hypertension)  Chronic issue, well controlled with home -250/70-80s.  No chest pain, palpitations or SOB. She only takes lisinopril 5mg prn if BP is more than 140/90, since her BP has been improving with weight loss.    Other headache syndrome  Chronic, intermittent issue. She feels fluid sensation at times in her head.  This discomfort resolves with flonase.  She uses flonase prn if symptoms start to occur.  No current discomfort.        Past Medical History:   Diagnosis Date    Acne 04/16/2012    Arthritis 09/16/2022    Right elbow, right knee and right ankle    Breast cancer (HCC) 2021    Cancer (HCC) 09/16/2022    breast cancer    Dental disorder 09/16/2022    flipper    Dysuria 04/16/2012    Family history of colon cancer 04/16/2012    HTN (hypertension) 04/16/2012    Hypertension     Microcytic anemia 08/22/2012    Thrombosed external hemorrhoid 10/24/2012    Vitamin d deficiency 04/16/2012       Social History     Tobacco Use    Smoking status: Never    Smokeless tobacco: Never   Vaping Use    Vaping Use: Never used   Substance Use Topics    Alcohol use: Not Currently    Drug use: No       Current Outpatient Medications Ordered in Epic   Medication Sig Dispense Refill    lisinopril (PRINIVIL) 5 MG Tab Take 1 Tablet by mouth every day. 30 Tablet 0    fluticasone (FLONASE) 50 MCG/ACT nasal spray Administer 1 Spray into affected nostril(S) every day. 16 g 0    NON SPECIFIED Take 1 Dose by mouth per provider order. Athletic greens with 4oz of water      omeprazole (PRILOSEC) 20 MG delayed-release capsule Take 1 Capsule by mouth every day. (Patient not taking:  "Reported on 3/28/2023) 90 Capsule 1     No current Epic-ordered facility-administered medications on file.       Allergies:  Sulfa drugs and Nsaids    ROS:  Gen: no fevers/chills, +purposeful weight loss  Pulm: no sob, no cough  CV: no chest pain, no palpitations  MSk: no myalgias        Objective:       Exam:  /82 (BP Location: Left arm, Patient Position: Sitting, BP Cuff Size: Adult long)   Pulse 69   Temp 36.1 °C (96.9 °F) (Temporal)   Resp 18   Ht 1.765 m (5' 9.5\")   Wt 89.8 kg (197 lb 13.8 oz)   LMP  (LMP Unknown) Comment: October 2020  SpO2 93%   BMI 28.80 kg/m²  Body mass index is 28.8 kg/m².    Gen: Alert and oriented, No apparent distress.  Neck: Neck is supple without lymphadenopathy.  Lungs: Normal effort, CTA bilaterally, no wheezes, rhonchi, or rales  CV: Regular rate and rhythm. No murmurs, rubs, or gallops.  Ext: No clubbing, cyanosis, edema.  Foot:  Left dorsal mild- lateral foot with 1cm round, fixed mass without superficial skin changes, TTP.  Borders are smooth.    Assessment & Plan:     57 y.o. female with the following -     1. Deformity of left foot  Acute, unclear cause.  Will check xray and if unable to determine cause on xray plan for MRI given her hx of cancer and lack of known injury.  She is not diabetic, denies metal in her body and denies claustrophobia.  Follow-up precautions given  - DX-FOOT-COMPLETE 3+ LEFT; Future    2. Hypertension, unspecified type  Chronic, well controlled with rare need for lisinopril since losing weight.  Pt has only needed the lisinopril for a couple days when under significant stress since last visit.  Refill given for prn use if BP is more than 140/90 and follow-up precautions given need for re-evaluation and labs if she needs this medication for more than a couple days given. Patient expressed understanding and agreement with plan.  - lisinopril (PRINIVIL) 5 MG Tab; Take 1 Tablet by mouth every day.  Dispense: 30 Tablet; Refill: 0    3. Other " headache syndrome  Chronic, well controlled with flonase.  Refill given.  - fluticasone (FLONASE) 50 MCG/ACT nasal spray; Administer 1 Spray into affected nostril(S) every day.  Dispense: 16 g; Refill: 0      Return in about 4 weeks (around 4/25/2023) for establish care with new PCP since Dr. Haji is transferring.    Please note that this dictation was created using voice recognition software. I have made every reasonable attempt to correct obvious errors, but I expect that there are errors of grammar and possibly content that I did not discover before finalizing the note.

## 2023-03-28 NOTE — ASSESSMENT & PLAN NOTE
Chronic issue, well controlled with home -713/70-80s.  No chest pain, palpitations or SOB. She only takes lisinopril 5mg prn if BP is more than 140/90, since her BP has been improving with weight loss.

## 2023-03-29 ENCOUNTER — PATIENT MESSAGE (OUTPATIENT)
Dept: MEDICAL GROUP | Facility: MEDICAL CENTER | Age: 58
End: 2023-03-29
Payer: COMMERCIAL

## 2023-03-31 PROBLEM — Z00.00 PREVENTATIVE HEALTH CARE: Status: ACTIVE | Noted: 2023-03-31

## 2023-03-31 NOTE — PROGRESS NOTES
Subjective:     CC:  Diagnoses of Hypertension, unspecified type, Other hyperlipidemia, Chronic GERD, Infiltrating ductal carcinoma of right breast (HCC), BMI 29.0-29.9,adult, S/P laparoscopic sleeve gastrectomy, and Preventative health care were pertinent to this visit.    HISTORY OF THE PRESENT ILLNESS: Patient is a 57 y.o. female. This pleasant patient is here today to establish care and discuss chronic conditions. Her prior PCP was Daly Haji DO.    Problem   Chronic Gerd    Chronic condition.  Current regimen: omeprazole 20mg daily as needed     Other Hyperlipidemia    Chronic condition. Stable with healthy lifestyle management.     S/P Laparoscopic Sleeve Gastrectomy    She is status post sleeve gastrectomy 10/2022.     Infiltrating Ductal Carcinoma of Right Breast (Hcc)    Chronic condition since 2021. She is s/p radiation and surgery. She would like to get mammogram order.         Bmi 29.0-29.9,Adult    Chronic condition. She tries to eat healthy in general.     Htn (Hypertension)    Chronic condition. Her blood pressure has improved and normalized with weight loss after sleeve gastrectomy.    Current regimen: lisinopril 5mg as needed             Current Outpatient Medications Ordered in Epic   Medication Sig Dispense Refill    lisinopril (PRINIVIL) 5 MG Tab Take 1 Tablet by mouth every day. 30 Tablet 0    fluticasone (FLONASE) 50 MCG/ACT nasal spray Administer 1 Spray into affected nostril(S) every day. 16 g 0    NON SPECIFIED Take 1 Dose by mouth per provider order. Athletic greens with 4oz of water      omeprazole (PRILOSEC) 20 MG delayed-release capsule Take 1 Capsule by mouth every day. (Patient not taking: Reported on 3/28/2023) 90 Capsule 1     No current Epic-ordered facility-administered medications on file.     Social history  Living situation: lives with family at home  Occupation: works as a   Alcohol/tobacco/illicit drugs: never smoker, social EtOH, denies illicit  "drugs    ROS:   Gen: no fevers/chills, no changes in weight  Pulm: no sob, no cough  CV: no chest pain, no palpitations  GI: no nausea/vomiting, no diarrhea  : no dysuria  MSk: no myalgias  Skin: no rash  Neuro: no headaches, no numbness/tingling  Heme/Lymph: no easy bruising      Objective:     Exam: /70   Pulse 75   Temp 36.4 °C (97.6 °F) (Temporal)   Resp 16   Ht 1.765 m (5' 9.5\")   Wt 91 kg (200 lb 9.9 oz)   SpO2 97%  Body mass index is 29.2 kg/m².    General: Normal appearing. No distress.  Neck: Supple without JVD or bruit. Thyroid is not enlarged.  Pulmonary: Clear to ausculation. Normal effort. No rales, ronchi, or wheezing.  Cardiovascular: Regular rate and rhythm without murmur.  Abdomen: Soft, nontender, nondistended. Normal bowel sounds.  Neurologic: Grossly nonfocal  Skin: Warm and dry. No obvious lesions.  Musculoskeletal: Normal gait. No extremity cyanosis, clubbing, or edema.  Psych: Normal mood and affect. Alert and oriented x3. Judgment and insight is normal.    Labs:   Lab Results   Component Value Date/Time    WBC 5.7 03/07/2023 10:34 AM    WBC 6.1 05/10/2012 08:54 AM    RBC 4.97 03/07/2023 10:34 AM    RBC 4.82 05/10/2012 08:54 AM    HEMOGLOBIN 14.6 03/07/2023 10:34 AM    HEMATOCRIT 44.4 03/07/2023 10:34 AM    MCV 89.3 03/07/2023 10:34 AM    MCV 75 (L) 05/10/2012 08:54 AM    MCH 29.4 03/07/2023 10:34 AM    MCH 23.9 (L) 05/10/2012 08:54 AM    MCHC 32.9 (L) 03/07/2023 10:34 AM    RDW 41.7 03/07/2023 10:34 AM    RDW 17.3 (H) 05/10/2012 08:54 AM    PLATELETCT 231 03/07/2023 10:34 AM    MPV 9.5 03/07/2023 10:34 AM      Lab Results   Component Value Date/Time    SODIUM 141 03/07/2023 10:34 AM    POTASSIUM 4.3 03/07/2023 10:34 AM    CHLORIDE 106 03/07/2023 10:34 AM    CO2 26 03/07/2023 10:34 AM    ANION 9.0 03/07/2023 10:34 AM    GLUCOSE 88 03/07/2023 10:34 AM    BUN 13 03/07/2023 10:34 AM    CREATININE 0.65 03/07/2023 10:34 AM    CREATININE 0.69 05/10/2012 08:54 AM    CALCIUM 9.4 " 03/07/2023 10:34 AM    ASTSGOT 17 03/07/2023 10:34 AM    ALTSGPT 12 03/07/2023 10:34 AM    TBILIRUBIN 0.3 03/07/2023 10:34 AM    ALBUMIN 4.1 03/07/2023 10:34 AM    TOTPROTEIN 6.8 03/07/2023 10:34 AM    GLOBULIN 2.7 03/07/2023 10:34 AM    AGRATIO 1.5 03/07/2023 10:34 AM     Lab Results   Component Value Date/Time    CHOLSTRLTOT 202 (H) 03/07/2023 1034    TRIGLYCERIDE 101 03/07/2023 1034    HDL 66 03/07/2023 1034     (H) 03/07/2023 1034     Lab Results   Component Value Date/Time    QRDIRBNE34 553 03/07/2023 1034       Assessment & Plan:   57 y.o. female with the following -    1. Hypertension, unspecified type  Chronic condition, stable without medications. Blood pressure has been normal since weight loss after sleeve gastrectomy.  - cont current regimen: lisinopril 5mg as needed    2. Other hyperlipidemia  Chronic condition. Stable with healthy lifestyle management.    3. Chronic GERD  Chronic condition, stable.  - cont current regimen: omeprazole 20mg daily as needed    4. Infiltrating ductal carcinoma of right breast (HCC)  Chronic condition since 2021. She is s/p radiation and surgery.  - plan to continue yearly mammogram for 5 years  - MA-DIAGNOSTIC MAMMO BILAT W/TOMOSYNTHESIS W/CAD; Future    5. BMI 29.0-29.9,adult  - Advised healthy diet/weight loss, regular exercise    6. S/P laparoscopic sleeve gastrectomy  - cont interval monitoring of vitamins    7. Preventative health care      Return if symptoms worsen or fail to improve.    Please note that this dictation was created using voice recognition software. I have made every reasonable attempt to correct obvious errors, but I expect that there are errors of grammar and possibly content that I did not discover before finalizing the note.

## 2023-04-04 ENCOUNTER — OFFICE VISIT (OUTPATIENT)
Dept: MEDICAL GROUP | Facility: MEDICAL CENTER | Age: 58
End: 2023-04-04
Payer: COMMERCIAL

## 2023-04-04 VITALS
BODY MASS INDEX: 28.72 KG/M2 | OXYGEN SATURATION: 97 % | HEIGHT: 70 IN | SYSTOLIC BLOOD PRESSURE: 128 MMHG | TEMPERATURE: 97.6 F | HEART RATE: 75 BPM | DIASTOLIC BLOOD PRESSURE: 70 MMHG | RESPIRATION RATE: 16 BRPM | WEIGHT: 200.62 LBS

## 2023-04-04 DIAGNOSIS — C50.911 INFILTRATING DUCTAL CARCINOMA OF RIGHT BREAST (HCC): ICD-10-CM

## 2023-04-04 DIAGNOSIS — I10 HYPERTENSION, UNSPECIFIED TYPE: ICD-10-CM

## 2023-04-04 DIAGNOSIS — E78.49 OTHER HYPERLIPIDEMIA: ICD-10-CM

## 2023-04-04 DIAGNOSIS — K21.9 CHRONIC GERD: ICD-10-CM

## 2023-04-04 DIAGNOSIS — Z00.00 PREVENTATIVE HEALTH CARE: ICD-10-CM

## 2023-04-04 DIAGNOSIS — Z98.84 S/P LAPAROSCOPIC SLEEVE GASTRECTOMY: ICD-10-CM

## 2023-04-04 PROCEDURE — 99214 OFFICE O/P EST MOD 30 MIN: CPT | Performed by: STUDENT IN AN ORGANIZED HEALTH CARE EDUCATION/TRAINING PROGRAM

## 2023-04-04 ASSESSMENT — FIBROSIS 4 INDEX: FIB4 SCORE: 1.17

## 2023-04-26 ENCOUNTER — HOSPITAL ENCOUNTER (OUTPATIENT)
Dept: RADIOLOGY | Facility: MEDICAL CENTER | Age: 58
End: 2023-04-26
Attending: STUDENT IN AN ORGANIZED HEALTH CARE EDUCATION/TRAINING PROGRAM
Payer: COMMERCIAL

## 2023-04-26 DIAGNOSIS — C50.911 INFILTRATING DUCTAL CARCINOMA OF RIGHT BREAST (HCC): ICD-10-CM

## 2023-04-26 PROCEDURE — G0279 TOMOSYNTHESIS, MAMMO: HCPCS

## 2023-05-02 ENCOUNTER — HOSPITAL ENCOUNTER (EMERGENCY)
Facility: MEDICAL CENTER | Age: 58
End: 2023-05-02
Attending: STUDENT IN AN ORGANIZED HEALTH CARE EDUCATION/TRAINING PROGRAM
Payer: COMMERCIAL

## 2023-05-02 ENCOUNTER — APPOINTMENT (OUTPATIENT)
Dept: RADIOLOGY | Facility: MEDICAL CENTER | Age: 58
End: 2023-05-02
Payer: COMMERCIAL

## 2023-05-02 VITALS
BODY MASS INDEX: 29.36 KG/M2 | SYSTOLIC BLOOD PRESSURE: 180 MMHG | DIASTOLIC BLOOD PRESSURE: 98 MMHG | OXYGEN SATURATION: 96 % | TEMPERATURE: 97.2 F | HEART RATE: 98 BPM | RESPIRATION RATE: 18 BRPM | WEIGHT: 198.19 LBS | HEIGHT: 69 IN

## 2023-05-02 DIAGNOSIS — S22.42XA CLOSED FRACTURE OF MULTIPLE RIBS OF LEFT SIDE, INITIAL ENCOUNTER: ICD-10-CM

## 2023-05-02 PROCEDURE — 99284 EMERGENCY DEPT VISIT MOD MDM: CPT

## 2023-05-02 PROCEDURE — 700102 HCHG RX REV CODE 250 W/ 637 OVERRIDE(OP): Performed by: STUDENT IN AN ORGANIZED HEALTH CARE EDUCATION/TRAINING PROGRAM

## 2023-05-02 PROCEDURE — 71101 X-RAY EXAM UNILAT RIBS/CHEST: CPT | Mod: LT

## 2023-05-02 PROCEDURE — 64450 NJX AA&/STRD OTHER PN/BRANCH: CPT

## 2023-05-02 PROCEDURE — 700101 HCHG RX REV CODE 250: Performed by: STUDENT IN AN ORGANIZED HEALTH CARE EDUCATION/TRAINING PROGRAM

## 2023-05-02 PROCEDURE — A9270 NON-COVERED ITEM OR SERVICE: HCPCS | Performed by: STUDENT IN AN ORGANIZED HEALTH CARE EDUCATION/TRAINING PROGRAM

## 2023-05-02 RX ORDER — OXYCODONE HYDROCHLORIDE AND ACETAMINOPHEN 5; 325 MG/1; MG/1
1 TABLET ORAL EVERY 4 HOURS PRN
Qty: 12 TABLET | Refills: 0 | Status: SHIPPED | OUTPATIENT
Start: 2023-05-02 | End: 2023-05-09

## 2023-05-02 RX ORDER — OXYCODONE HYDROCHLORIDE AND ACETAMINOPHEN 5; 325 MG/1; MG/1
1 TABLET ORAL ONCE
Status: COMPLETED | OUTPATIENT
Start: 2023-05-02 | End: 2023-05-02

## 2023-05-02 RX ADMIN — LIDOCAINE HYDROCHLORIDE 10 ML: 10 INJECTION, SOLUTION INFILTRATION; PERINEURAL at 03:45

## 2023-05-02 RX ADMIN — OXYCODONE AND ACETAMINOPHEN 1 TABLET: 325; 5 TABLET ORAL at 04:12

## 2023-05-02 ASSESSMENT — LIFESTYLE VARIABLES
EVER HAD A DRINK FIRST THING IN THE MORNING TO STEADY YOUR NERVES TO GET RID OF A HANGOVER: NO
TOTAL SCORE: 0
HAVE PEOPLE ANNOYED YOU BY CRITICIZING YOUR DRINKING: NO
TOTAL SCORE: 0
CONSUMPTION TOTAL: NEGATIVE
DO YOU DRINK ALCOHOL: NO
HAVE YOU EVER FELT YOU SHOULD CUT DOWN ON YOUR DRINKING: NO
TOTAL SCORE: 0
HOW MANY TIMES IN THE PAST YEAR HAVE YOU HAD 5 OR MORE DRINKS IN A DAY: 0
AVERAGE NUMBER OF DAYS PER WEEK YOU HAVE A DRINK CONTAINING ALCOHOL: 0
EVER FELT BAD OR GUILTY ABOUT YOUR DRINKING: NO
ON A TYPICAL DAY WHEN YOU DRINK ALCOHOL HOW MANY DRINKS DO YOU HAVE: 0

## 2023-05-02 ASSESSMENT — FIBROSIS 4 INDEX: FIB4 SCORE: 1.17

## 2023-05-02 NOTE — ED PROVIDER NOTES
"ED Provider Note    CHIEF COMPLAINT  Chief Complaint   Patient presents with    Fall     Pt went outside to save plants from the snow. Pt feel onto a 90 degree steel beam. Pt has left sided pain and can feel fluid building up.        EXTERNAL RECORDS REVIEWED  Outpatient Notes office visit on 4/4/2023 for hypertension    HPI/ROS  LIMITATION TO HISTORY   Select: : None  OUTSIDE HISTORIAN(S):      Laura Cano is a 57 y.o. female who presents immediately following a ground-level fall onto a steel beam while trying to save her plants outside the snow in the cold weather.  Patient endorses severe left-sided pain.  Patient reports that she can \"feel fluid building up\" around the area and swelling.  Denies difficulty breathing but does endorse pain with breathing.  Patient denies head trauma, neck pain, back pain.  Patient has extensive history of breast cancer, Oestreich bypass and cannot take NSAIDs.    PAST MEDICAL HISTORY   has a past medical history of Acne (04/16/2012), Arthritis (09/16/2022), Breast cancer (HCC) (2021), Cancer (HCC) (09/16/2022), Dental disorder (09/16/2022), Dysuria (04/16/2012), Family history of colon cancer (04/16/2012), HTN (hypertension) (04/16/2012), Hypertension, Microcytic anemia (08/22/2012), Thrombosed external hemorrhoid (10/24/2012), and Vitamin d deficiency (04/16/2012).    SURGICAL HISTORY   has a past surgical history that includes tonsillectomy; tubal ligation; mastectomy, partial (Right, 5/19/2021); node biopsy sentinel (Right, 5/19/2021); lumpectomy (Right, 05/19/2021); radiation therapy plan simple (Right, 2021); and lap, marcello restrict proc, longitudinal gas* (10/5/2022).    FAMILY HISTORY  Family History   Problem Relation Age of Onset    Lung Disease Father         emphysema    Heart Disease Father         d. MI age 67    Hypertension Father     Lung Disease Mother         emphysema    Osteoporosis Mother     Diabetes Brother        SOCIAL HISTORY  Social History " "    Tobacco Use    Smoking status: Never    Smokeless tobacco: Never   Vaping Use    Vaping Use: Never used   Substance and Sexual Activity    Alcohol use: Not Currently    Drug use: No    Sexual activity: Yes     Partners: Male       CURRENT MEDICATIONS  Home Medications       Reviewed by Jesus Toledo R.N. (Registered Nurse) on 05/02/23 at 0312  Med List Status: Partial     Medication Last Dose Status   fluticasone (FLONASE) 50 MCG/ACT nasal spray  Active   lisinopril (PRINIVIL) 5 MG Tab  Active   NON SPECIFIED  Active   omeprazole (PRILOSEC) 20 MG delayed-release capsule  Active                    ALLERGIES  Allergies   Allergen Reactions    Sulfa Drugs Anaphylaxis     Other reaction(s): anaphylactic    Nsaids      Pt has hx of gastric sleeve       PHYSICAL EXAM  VITAL SIGNS: BP (!) 189/101   Pulse 100   Temp 35.9 °C (96.6 °F) (Temporal)   Resp 20   Ht 1.753 m (5' 9\")   Wt 89.9 kg (198 lb 3.1 oz)   LMP  (LMP Unknown) Comment: October 2020  SpO2 95%   BMI 29.27 kg/m²    Vitals and nursing note reviewed.   Constitutional:       Comments: Patient is standing up next to the bed in acute distress secondary to pain  HENT:      Head: Normocephalic and atraumatic.   Eyes:      Extraocular Movements: Extraocular movements intact.      Conjunctiva/sclera: Conjunctivae normal.      Pupils: Pupils are equal, round, and reactive to light.   Cardiovascular:      Pulses: Normal pulses.      Comments:   Pulmonary:      Effort: Pulmonary effort is normal. No respiratory distress.  Left-sided chest wall pain just lateral and inferior to the left breast but no crepitus, point-of-care ultrasound demonstrates no pneumothorax.  Musculoskeletal:         General: No swelling. Normal range of motion.      Cervical back: Normal range of motion. No rigidity.   Skin:     General: Skin is warm and dry.      Capillary Refill: Capillary refill takes less than 2 seconds.   Neurological:      Mental Status: Alert. "       DIAGNOSTIC STUDIES / PROCEDURES    RADIOLOGY  I have independently interpreted the diagnostic imaging associated with this visit and am waiting the final reading from the radiologist.   My preliminary interpretation is as follows: 9th and 10th rib fractures  Radiologist interpretation: 9th and 10th rib fractures    COURSE & MEDICAL DECISION MAKING      INITIAL ASSESSMENT, COURSE AND PLAN  Care Narrative: X-ray to evaluate for fracture of the ribs, pneumothorax, hemothorax.  Patient in no acute respiratory distress or hypoxemia thankfully.  Patient has declined pain medication at this time pending x-ray imaging.  Patient without head trauma, neck or spine trauma.  Disposition pending x-ray imaging.    Electronically signed by: Alvaro Miller M.D., 5/2/2023 3:35 AM    X-ray of the chest demonstrate no pneumothorax, hemothorax.  Patient does have ninth and 10th rib fractures on the left.  Patient be given pain medicine, nerve block, discharged home with pain medicine incentive spirometry and return precautions.    Repeat physical exam benign.  I doubt any serious emergency process at this time.  Patient and/or family, friends given strict return precautions and care instructions. They have demonstrated understanding of discharge instructions through teach back mechanism. Advised PCP follow-up in 1-2 days.  Patient/family/friend expresses understanding and agrees to plan.    This dictation has been created using voice recognition software. I am continuously working with the software to minimize the number of voice recognition errors and I have made every attempt to manually correct the errors within my dictation. However errors  related to this voice recognition software may still exist and should be interpreted within the appropriate context.     Electronically signed by: Alvaro Miller M.D., 5/2/2023 4:12 AM    In prescribing controlled substances to this patient, I certify that I have obtained and  reviewed the medical history of Laura Cano. I have also made a good klaus effort to obtain applicable records from other providers who have treated the patient and records did not demonstrate any increased risk of substance abuse that would prevent me from prescribing controlled substances.     I have conducted a physical exam and documented it. I have reviewed Ms. Cano’s prescription history as maintained by the [place] Prescription Monitoring Program.     I have assessed the patient’s risk for abuse, dependency, and addiction using the validated Opioid Risk Tool available at https://www.mdcalc.com/qesuww-oqbw-ydss-ort-narcotic-abuse.     Given the above, I believe the benefits of controlled substance therapy outweigh the risks. The reasons for prescribing controlled substances include in my professional opinion, controlled substances are the only reasonable choice for this patient because of severe pain . Accordingly, I have discussed the risk and benefits, treatment plan, and alternative therapies with the patient.     Peripheral Block     Date: @date@  Performed by: Alvaro Miller M.D.  Authorized by: Alvaro Miller M.D.      Patient Location:  ED  Start Time: 5/2/2023 4:12 AM  Reason for Block: Rib fracture  Patient Position:  Supine  Prep: Chlorhexidine  Monitoring:  Heart rate, continuous pulse ox and cardiac monitor  Block Region: Ninth and 10th ribs  Block Type: Intercostal  Laterality: Left  Local Infiltration: Lidocaine  Strength:  1%  Dose:  5 mL  Block Type:  Single-shot  Needle Gauge:  22 G  Injection Assessment:  Negative aspiration for heme, no paresthesia on injection, incremental injection  Evidence of intravascular injection: No    Neurovascular status: Intact                  DISPOSITION AND DISCUSSIONS    Escalation of care considered, and ultimately not performed:acute inpatient care management, however at this time, the patient is most appropriate for outpatient  management      Decision tools and prescription drugs considered including, but not limited to: Antibiotics not indicated at this time in the absence of bacterial infection .    FINAL DIAGNOSIS  1. Closed fracture of multiple ribs of left side, initial encounter           Electronically signed by: Alvaro Miller M.D., 5/2/2023 3:32 AM

## 2023-05-02 NOTE — ED TRIAGE NOTES
Chief Complaint   Patient presents with    Fall     Pt went outside to save plants from the snow. Pt feel onto a 90 degree steel beam. Pt has left sided pain and can feel fluid building up.      Pt feel about 20 mins. C/O 10/10 pain to the left side of her ribs.

## 2023-05-08 PROBLEM — S22.42XD CLOSED FRACTURE OF MULTIPLE RIBS OF LEFT SIDE WITH ROUTINE HEALING: Status: ACTIVE | Noted: 2023-05-08

## 2023-05-08 NOTE — PROGRESS NOTES
Virtual Visit: Established Patient   This visit was conducted via Zoom using secure and encrypted videoconferencing technology.   The patient was in their home in the [facility].    The patient's identity was confirmed and verbal consent was obtained for this virtual visit.    Subjective:   CC:   Chief Complaint   Patient presents with    Follow-Up     Broken ribs , spasms / medication refill     Orders Needed     Xray / Left arm , shoulder      Laura Cano is a 57 y.o. female presenting for evaluation and management of:    Problem   Acute Pain of Left Shoulder    Acute condition.    Character: aching pain  Onset: since 5/2/23 after her fall  Location: left shoulder  Duration: constant  Exacerbating factors: moving left shoulder  Relieving factors: pain medication  Associated symptoms: weakness  Severity: persistent     Left Arm Pain    Acute condition.    Character: acing pain  Onset: since 5/2/23 after her fall  Location: left upper arm  Duration: constant  Exacerbating factors: moving left arm  Relieving factors: pain medication  Associated symptoms: weakness  Severity: persistent     Closed Fracture of Multiple Ribs of Left Side With Routine Healing    Recent condition. Patient went to ED on 5/2/23 after she fell onto a steel beam. She was found to have closed fracture of left 9th and 10th ribs. She was given peripheral nerve block and was sent home with Percocet which she has not been taking due to side effects from it. Patient reports she has been taking the left over liquid hydrocodone from her previous gastric sleeve surgery 10/2022 which is easier for her to take compared to the Percocet. Patient is requesting refill on the liquid hydrocodone and would like to dispose of the Percocet medications.         ROS   See HPI    Current medicines (including changes today)  Current Outpatient Medications   Medication Sig Dispense Refill    HYDROcodone-acetaminophen 2.5-108 mg/5mL (HYCET) 7.5-325 MG/15ML  solution Take 15 mL by mouth 2 times a day as needed for Severe Pain for up to 7 days. 210 mL 0    cyclobenzaprine (FLEXERIL) 10 mg Tab Take 0.5-1 Tablets by mouth 3 times a day as needed for Muscle Spasms or Moderate Pain. 30 Tablet 2    oxyCODONE-acetaminophen (PERCOCET) 5-325 MG Tab Take 1 Tablet by mouth every four hours as needed for Severe Pain for up to 12 doses. 12 Tablet 0    lisinopril (PRINIVIL) 5 MG Tab Take 1 Tablet by mouth every day. 30 Tablet 0    fluticasone (FLONASE) 50 MCG/ACT nasal spray Administer 1 Spray into affected nostril(S) every day. 16 g 0    NON SPECIFIED Take 1 Dose by mouth per provider order. Athletic greens with 4oz of water      omeprazole (PRILOSEC) 20 MG delayed-release capsule Take 1 Capsule by mouth every day. (Patient not taking: Reported on 3/28/2023) 90 Capsule 1     No current facility-administered medications for this visit.       Patient Active Problem List    Diagnosis Date Noted    Acute pain of left shoulder 05/09/2023    Left arm pain 05/09/2023    Closed fracture of multiple ribs of left side with routine healing 05/08/2023    Chronic GERD 04/04/2023    Preventative health care 03/31/2023    Other hyperlipidemia 12/28/2022    Arthritis 12/28/2022    S/P laparoscopic sleeve gastrectomy 10/05/2022    Malignant neoplasm of upper-outer quadrant of right breast in female, estrogen receptor positive (HCC) 06/08/2021    Infiltrating ductal carcinoma of right breast (HCC) 05/05/2021    Other headache syndrome 03/02/2021    Fluid level behind tympanic membrane of right ear 03/02/2021    Family history of osteoporosis 02/24/2021    BMI 29.0-29.9,adult 02/24/2021    Vasovagal near syncope 12/16/2014    Microcytic anemia 08/22/2012    HTN (hypertension) 04/16/2012    Acne 04/16/2012    Family history of colon cancer 04/16/2012        Objective:   LMP  (LMP Unknown) Comment: October 2020    Physical Exam: limited exam due to virtual visit  Constitutional: Alert, no distress,  well-groomed.  Respiratory: Unlabored respiratory effort  Neuro: Alert and oriented x3, normal conversation.     Assessment and Plan:   The following treatment plan was discussed:     1. Closed fracture of multiple ribs of left side with routine healing  Acute condition, improving but with persistent pain. She cannot tolerate oral pain tablets so I have instructed her to return her oxycodone to her pharmacy.  - Rx Hycet per order, PDMP reviewed and appropriate  - HYDROcodone-acetaminophen 2.5-108 mg/5mL (HYCET) 7.5-325 MG/15ML solution; Take 15 mL by mouth 2 times a day as needed for Severe Pain for up to 7 days.  Dispense: 210 mL; Refill: 0  - cyclobenzaprine (FLEXERIL) 10 mg Tab; Take 0.5-1 Tablets by mouth 3 times a day as needed for Muscle Spasms or Moderate Pain.  Dispense: 30 Tablet; Refill: 2    2. Acute pain of left shoulder  Acute condition, persistent. Follow up X-ray per order.  - DX-SHOULDER 2+ LEFT; Future    3. Left arm pain  Acute condition, persistent. Follow up X-ray per order.  - DX-HUMERUS 2+ LEFT; Future      Follow-up: No follow-ups on file.

## 2023-05-09 ENCOUNTER — HOSPITAL ENCOUNTER (OUTPATIENT)
Dept: RADIOLOGY | Facility: MEDICAL CENTER | Age: 58
End: 2023-05-09
Attending: STUDENT IN AN ORGANIZED HEALTH CARE EDUCATION/TRAINING PROGRAM
Payer: COMMERCIAL

## 2023-05-09 ENCOUNTER — TELEMEDICINE (OUTPATIENT)
Dept: MEDICAL GROUP | Facility: MEDICAL CENTER | Age: 58
End: 2023-05-09
Payer: COMMERCIAL

## 2023-05-09 DIAGNOSIS — M25.512 ACUTE PAIN OF LEFT SHOULDER: ICD-10-CM

## 2023-05-09 DIAGNOSIS — M79.602 LEFT ARM PAIN: ICD-10-CM

## 2023-05-09 DIAGNOSIS — S22.42XD CLOSED FRACTURE OF MULTIPLE RIBS OF LEFT SIDE WITH ROUTINE HEALING: ICD-10-CM

## 2023-05-09 PROCEDURE — 73060 X-RAY EXAM OF HUMERUS: CPT | Mod: LT

## 2023-05-09 PROCEDURE — 99214 OFFICE O/P EST MOD 30 MIN: CPT | Mod: 95 | Performed by: STUDENT IN AN ORGANIZED HEALTH CARE EDUCATION/TRAINING PROGRAM

## 2023-05-09 PROCEDURE — 73030 X-RAY EXAM OF SHOULDER: CPT | Mod: LT

## 2023-05-09 RX ORDER — CYCLOBENZAPRINE HCL 10 MG
5-10 TABLET ORAL 3 TIMES DAILY PRN
Qty: 30 TABLET | Refills: 2 | Status: SHIPPED
Start: 2023-05-09 | End: 2023-06-19

## 2023-05-11 DIAGNOSIS — M79.602 LEFT ARM PAIN: ICD-10-CM

## 2023-05-11 DIAGNOSIS — M25.512 ACUTE PAIN OF LEFT SHOULDER: ICD-10-CM

## 2023-05-11 PROCEDURE — 1126F AMNT PAIN NOTED NONE PRSNT: CPT | Performed by: STUDENT IN AN ORGANIZED HEALTH CARE EDUCATION/TRAINING PROGRAM

## 2023-05-12 NOTE — PROGRESS NOTES
Patient with persistent left shoulder and upper arm pain after recent injury. X-ray negative. Refer to orthopedics for further evaluation and/or management.

## 2023-05-26 ENCOUNTER — HOSPITAL ENCOUNTER (OUTPATIENT)
Facility: MEDICAL CENTER | Age: 58
End: 2023-05-26
Attending: ORTHOPAEDIC SURGERY | Admitting: ORTHOPAEDIC SURGERY
Payer: COMMERCIAL

## 2023-05-26 VITALS — HEIGHT: 70 IN | WEIGHT: 186 LBS | BODY MASS INDEX: 26.63 KG/M2

## 2023-05-26 DIAGNOSIS — M75.112 INCOMPLETE TEAR OF LEFT ROTATOR CUFF, UNSPECIFIED WHETHER TRAUMATIC: ICD-10-CM

## 2023-05-26 DIAGNOSIS — M75.42 SUBACROMIAL IMPINGEMENT OF LEFT SHOULDER: ICD-10-CM

## 2023-05-26 DIAGNOSIS — M75.22 BICEPS TENDINITIS OF LEFT UPPER EXTREMITY: ICD-10-CM

## 2023-05-26 PROBLEM — M75.20 BICEPS TENDONITIS: Status: ACTIVE | Noted: 2023-05-26

## 2023-05-26 PROBLEM — M75.102 LEFT ROTATOR CUFF TEAR: Status: ACTIVE | Noted: 2023-05-26

## 2023-05-26 ASSESSMENT — FIBROSIS 4 INDEX: FIB4 SCORE: 1.17

## 2023-06-06 ENCOUNTER — APPOINTMENT (OUTPATIENT)
Dept: ADMISSIONS | Facility: MEDICAL CENTER | Age: 58
End: 2023-06-06
Attending: ORTHOPAEDIC SURGERY
Payer: COMMERCIAL

## 2023-06-16 ENCOUNTER — APPOINTMENT (OUTPATIENT)
Dept: ADMISSIONS | Facility: MEDICAL CENTER | Age: 58
End: 2023-06-16
Attending: ORTHOPAEDIC SURGERY
Payer: COMMERCIAL

## 2023-06-16 PROBLEM — M75.122 COMPLETE ROTATOR CUFF TEAR OF LEFT SHOULDER: Status: ACTIVE | Noted: 2023-06-16

## 2023-06-16 PROBLEM — M19.012 ARTHROSIS OF LEFT ACROMIOCLAVICULAR JOINT: Status: ACTIVE | Noted: 2023-06-16

## 2023-06-16 PROBLEM — M75.22 BICEPS TENDONITIS, LEFT: Status: ACTIVE | Noted: 2023-06-16

## 2023-06-19 ENCOUNTER — PRE-ADMISSION TESTING (OUTPATIENT)
Dept: ADMISSIONS | Facility: MEDICAL CENTER | Age: 58
End: 2023-06-19
Attending: ORTHOPAEDIC SURGERY
Payer: COMMERCIAL

## 2023-07-12 ENCOUNTER — OFFICE VISIT (OUTPATIENT)
Dept: MEDICAL GROUP | Facility: MEDICAL CENTER | Age: 58
End: 2023-07-12
Payer: COMMERCIAL

## 2023-07-12 VITALS
SYSTOLIC BLOOD PRESSURE: 118 MMHG | HEART RATE: 90 BPM | WEIGHT: 184.97 LBS | HEIGHT: 70 IN | TEMPERATURE: 98.1 F | BODY MASS INDEX: 26.48 KG/M2 | DIASTOLIC BLOOD PRESSURE: 80 MMHG | OXYGEN SATURATION: 96 %

## 2023-07-12 DIAGNOSIS — Z01.818 PRE-OP EVALUATION: ICD-10-CM

## 2023-07-12 DIAGNOSIS — S46.012D TRAUMATIC COMPLETE TEAR OF LEFT ROTATOR CUFF, SUBSEQUENT ENCOUNTER: ICD-10-CM

## 2023-07-12 DIAGNOSIS — L98.9 SKIN LESION OF LEFT LEG: ICD-10-CM

## 2023-07-12 PROCEDURE — 3074F SYST BP LT 130 MM HG: CPT | Performed by: STUDENT IN AN ORGANIZED HEALTH CARE EDUCATION/TRAINING PROGRAM

## 2023-07-12 PROCEDURE — 99214 OFFICE O/P EST MOD 30 MIN: CPT | Performed by: STUDENT IN AN ORGANIZED HEALTH CARE EDUCATION/TRAINING PROGRAM

## 2023-07-12 PROCEDURE — 3079F DIAST BP 80-89 MM HG: CPT | Performed by: STUDENT IN AN ORGANIZED HEALTH CARE EDUCATION/TRAINING PROGRAM

## 2023-07-12 ASSESSMENT — FIBROSIS 4 INDEX: FIB4 SCORE: 1.17

## 2023-07-12 NOTE — PROGRESS NOTES
"Subjective:     CC: left leg skin lesion    HPI:   Laura presents today for left leg skin lesion    Problem   Skin Lesion of Left Leg    Acute condition. No personal or family history of skin cancer.    Character: skin lesion  Onset: several weeks  Location: left lower leg  Duration: constant  Exacerbating factors: unknown  Relieving factors: unknown  Associated symptoms: none   Severity: persistent, worsened     Pre-Op Evaluation    Patient is getting elective left shoulder surgery scheduled for 7/18/23.     Complete Rotator Cuff Tear of Left Shoulder    Recent condition.         Current Outpatient Medications Ordered in Epic   Medication Sig Dispense Refill    fluticasone (FLONASE) 50 MCG/ACT nasal spray Administer 1 Spray into affected nostril(S) every day. 16 g 0    NON SPECIFIED Take 1 Dose by mouth per provider order. Athletic greens with 4oz of water      omeprazole (PRILOSEC) 20 MG delayed-release capsule Take 1 Capsule by mouth every day. (Patient not taking: Reported on 3/28/2023) 90 Capsule 1     No current Epic-ordered facility-administered medications on file.     ROS:  See HPI    Objective:     Exam:  /80 (BP Location: Other (Comment), Patient Position: Sitting, BP Cuff Size: Adult) Comment (BP Location): Left wrist  Pulse 90   Temp 36.7 °C (98.1 °F) (Temporal)   Ht 1.778 m (5' 10\")   Wt 83.9 kg (184 lb 15.5 oz)   LMP  (LMP Unknown) Comment: October 2020  SpO2 96%   BMI 26.54 kg/m²  Body mass index is 26.54 kg/m².    Gen: Alert and oriented, No apparent distress.  Lungs: Normal effort, CTA bilaterally, no wheezes, rhonchi, or rales  CV: Regular rate and rhythm. No murmurs, rubs, or gallops.  Ext: No clubbing, cyanosis, edema.  Skin:    Single 4x4mm raised hyperpigmented skin lesion with uneven pigmentation to left lateral lower leg.    RCRI score 0    Assessment & Plan:     57 y.o. adult with the following -     1. Skin lesion of left leg  Acute condition, persistent and had recent " change in texture. History of right breast cancer. Refer to dermatology for further evaluation and/or management.  - Referral to Dermatology    2. Pre-op evaluation  Recent condition. Planned for elective left shoulder surgery 7/18/23. Will have her check with orthopedic specialist to see if any pre-op blood work is needed.  -The patient does not have any major cardiac contraindications to non-emergent surgery and is scheduled for an urgent and elevated risk procedure with a class I risk (RCRI of 0) clinical cardiac predictors   -Therefore, the cardiac risk is 3.9% of anticipated cardiovascular events (MACE) which is considered Class I low risk   -No further cardiac work up or medical optimization is indicated at this point and patient may proceed to surgery at the discretion of her surgeon   - cont management per orthopedic surgery  - CBC WITH DIFFERENTIAL; Future  - Comp Metabolic Panel; Future  - Prothrombin Time; Future    3. Traumatic complete tear of left rotator cuff, subsequent encounter  Recent condition. Planned for elective left shoulder surgery 7/18/23.   - cont management per orthopedic surgery    Return if symptoms worsen or fail to improve.    Please note that this dictation was created using voice recognition software. I have made every reasonable attempt to correct obvious errors, but I expect that there are errors of grammar and possibly content that I did not discover before finalizing the note.

## 2023-07-18 ENCOUNTER — HOSPITAL ENCOUNTER (OUTPATIENT)
Facility: MEDICAL CENTER | Age: 58
End: 2023-07-18
Attending: ORTHOPAEDIC SURGERY | Admitting: ORTHOPAEDIC SURGERY
Payer: COMMERCIAL

## 2023-07-18 ENCOUNTER — ANESTHESIA (OUTPATIENT)
Dept: SURGERY | Facility: MEDICAL CENTER | Age: 58
End: 2023-07-18
Payer: COMMERCIAL

## 2023-07-18 ENCOUNTER — ANESTHESIA EVENT (OUTPATIENT)
Dept: SURGERY | Facility: MEDICAL CENTER | Age: 58
End: 2023-07-18
Payer: COMMERCIAL

## 2023-07-18 VITALS
BODY MASS INDEX: 27.76 KG/M2 | HEART RATE: 56 BPM | RESPIRATION RATE: 18 BRPM | SYSTOLIC BLOOD PRESSURE: 164 MMHG | DIASTOLIC BLOOD PRESSURE: 82 MMHG | TEMPERATURE: 96.4 F | OXYGEN SATURATION: 92 % | HEIGHT: 69 IN | WEIGHT: 187.39 LBS

## 2023-07-18 DIAGNOSIS — G89.18 POST-OP PAIN: ICD-10-CM

## 2023-07-18 PROCEDURE — A9270 NON-COVERED ITEM OR SERVICE: HCPCS | Performed by: STUDENT IN AN ORGANIZED HEALTH CARE EDUCATION/TRAINING PROGRAM

## 2023-07-18 PROCEDURE — 29827 SHO ARTHRS SRG RT8TR CUF RPR: CPT | Mod: LT | Performed by: ORTHOPAEDIC SURGERY

## 2023-07-18 PROCEDURE — 64415 NJX AA&/STRD BRCH PLXS IMG: CPT | Mod: 59,LT | Performed by: STUDENT IN AN ORGANIZED HEALTH CARE EDUCATION/TRAINING PROGRAM

## 2023-07-18 PROCEDURE — 29823 SHO ARTHRS SRG XTNSV DBRDMT: CPT | Mod: ASROC,LT | Performed by: PHYSICIAN ASSISTANT

## 2023-07-18 PROCEDURE — 160002 HCHG RECOVERY MINUTES (STAT): Performed by: ORTHOPAEDIC SURGERY

## 2023-07-18 PROCEDURE — 160029 HCHG SURGERY MINUTES - 1ST 30 MINS LEVEL 4: Performed by: ORTHOPAEDIC SURGERY

## 2023-07-18 PROCEDURE — 160046 HCHG PACU - 1ST 60 MINS PHASE II: Performed by: ORTHOPAEDIC SURGERY

## 2023-07-18 PROCEDURE — 700105 HCHG RX REV CODE 258: Mod: JZ | Performed by: STUDENT IN AN ORGANIZED HEALTH CARE EDUCATION/TRAINING PROGRAM

## 2023-07-18 PROCEDURE — 23430 REPAIR BICEPS TENDON: CPT | Mod: ASROC,LT | Performed by: PHYSICIAN ASSISTANT

## 2023-07-18 PROCEDURE — 64415 NJX AA&/STRD BRCH PLXS IMG: CPT | Performed by: ORTHOPAEDIC SURGERY

## 2023-07-18 PROCEDURE — 29823 SHO ARTHRS SRG XTNSV DBRDMT: CPT | Mod: LT | Performed by: ORTHOPAEDIC SURGERY

## 2023-07-18 PROCEDURE — 700111 HCHG RX REV CODE 636 W/ 250 OVERRIDE (IP): Mod: JZ | Performed by: STUDENT IN AN ORGANIZED HEALTH CARE EDUCATION/TRAINING PROGRAM

## 2023-07-18 PROCEDURE — 23430 REPAIR BICEPS TENDON: CPT | Mod: LT | Performed by: ORTHOPAEDIC SURGERY

## 2023-07-18 PROCEDURE — 29827 SHO ARTHRS SRG RT8TR CUF RPR: CPT | Mod: ASROC,LT | Performed by: PHYSICIAN ASSISTANT

## 2023-07-18 PROCEDURE — C1713 ANCHOR/SCREW BN/BN,TIS/BN: HCPCS | Performed by: ORTHOPAEDIC SURGERY

## 2023-07-18 PROCEDURE — 700105 HCHG RX REV CODE 258: Mod: JZ | Performed by: ORTHOPAEDIC SURGERY

## 2023-07-18 PROCEDURE — 160025 RECOVERY II MINUTES (STATS): Performed by: ORTHOPAEDIC SURGERY

## 2023-07-18 PROCEDURE — 160047 HCHG PACU  - EA ADDL 30 MINS PHASE II: Performed by: ORTHOPAEDIC SURGERY

## 2023-07-18 PROCEDURE — 160009 HCHG ANES TIME/MIN: Performed by: ORTHOPAEDIC SURGERY

## 2023-07-18 PROCEDURE — 160048 HCHG OR STATISTICAL LEVEL 1-5: Performed by: ORTHOPAEDIC SURGERY

## 2023-07-18 PROCEDURE — 160041 HCHG SURGERY MINUTES - EA ADDL 1 MIN LEVEL 4: Performed by: ORTHOPAEDIC SURGERY

## 2023-07-18 PROCEDURE — 700101 HCHG RX REV CODE 250: Performed by: STUDENT IN AN ORGANIZED HEALTH CARE EDUCATION/TRAINING PROGRAM

## 2023-07-18 PROCEDURE — 01630 ANES OPN/ARTHR PX SHO JT NOS: CPT | Performed by: STUDENT IN AN ORGANIZED HEALTH CARE EDUCATION/TRAINING PROGRAM

## 2023-07-18 PROCEDURE — 160035 HCHG PACU - 1ST 60 MINS PHASE I: Performed by: ORTHOPAEDIC SURGERY

## 2023-07-18 PROCEDURE — 700102 HCHG RX REV CODE 250 W/ 637 OVERRIDE(OP): Performed by: STUDENT IN AN ORGANIZED HEALTH CARE EDUCATION/TRAINING PROGRAM

## 2023-07-18 DEVICE — ANCHOR SUTURE ICONIX 2 WITH #2 FORCE FIBER 2.3MM (5EA/BX): Type: IMPLANTABLE DEVICE | Site: SHOULDER | Status: FUNCTIONAL

## 2023-07-18 DEVICE — ANCHOR SUTURE 4.75MM ALPHAVENT PEEK 3 STRANDS WITH #2 XBRAID (1EA): Type: IMPLANTABLE DEVICE | Site: SHOULDER | Status: FUNCTIONAL

## 2023-07-18 RX ORDER — SODIUM CHLORIDE, SODIUM LACTATE, POTASSIUM CHLORIDE, CALCIUM CHLORIDE 600; 310; 30; 20 MG/100ML; MG/100ML; MG/100ML; MG/100ML
INJECTION, SOLUTION INTRAVENOUS CONTINUOUS
Status: DISCONTINUED | OUTPATIENT
Start: 2023-07-18 | End: 2023-07-18 | Stop reason: HOSPADM

## 2023-07-18 RX ORDER — CEFAZOLIN SODIUM 1 G/3ML
INJECTION, POWDER, FOR SOLUTION INTRAMUSCULAR; INTRAVENOUS PRN
Status: DISCONTINUED | OUTPATIENT
Start: 2023-07-18 | End: 2023-07-18 | Stop reason: SURG

## 2023-07-18 RX ORDER — CEFAZOLIN SODIUM 1 G/3ML
2 INJECTION, POWDER, FOR SOLUTION INTRAMUSCULAR; INTRAVENOUS ONCE
Status: DISCONTINUED | OUTPATIENT
Start: 2023-07-18 | End: 2023-07-18 | Stop reason: HOSPADM

## 2023-07-18 RX ORDER — DEXAMETHASONE SODIUM PHOSPHATE 4 MG/ML
INJECTION, SOLUTION INTRA-ARTICULAR; INTRALESIONAL; INTRAMUSCULAR; INTRAVENOUS; SOFT TISSUE
Status: COMPLETED | OUTPATIENT
Start: 2023-07-18 | End: 2023-07-18

## 2023-07-18 RX ORDER — LIDOCAINE HYDROCHLORIDE 20 MG/ML
INJECTION, SOLUTION EPIDURAL; INFILTRATION; INTRACAUDAL; PERINEURAL PRN
Status: DISCONTINUED | OUTPATIENT
Start: 2023-07-18 | End: 2023-07-18 | Stop reason: SURG

## 2023-07-18 RX ORDER — SODIUM CHLORIDE, SODIUM LACTATE, POTASSIUM CHLORIDE, CALCIUM CHLORIDE 600; 310; 30; 20 MG/100ML; MG/100ML; MG/100ML; MG/100ML
INJECTION, SOLUTION INTRAVENOUS
Status: DISCONTINUED | OUTPATIENT
Start: 2023-07-18 | End: 2023-07-18 | Stop reason: SURG

## 2023-07-18 RX ORDER — MEPERIDINE HYDROCHLORIDE 25 MG/ML
12.5 INJECTION INTRAMUSCULAR; INTRAVENOUS; SUBCUTANEOUS
Status: DISCONTINUED | OUTPATIENT
Start: 2023-07-18 | End: 2023-07-18 | Stop reason: HOSPADM

## 2023-07-18 RX ORDER — HYDROMORPHONE HYDROCHLORIDE 1 MG/ML
0.2 INJECTION, SOLUTION INTRAMUSCULAR; INTRAVENOUS; SUBCUTANEOUS
Status: DISCONTINUED | OUTPATIENT
Start: 2023-07-18 | End: 2023-07-18 | Stop reason: HOSPADM

## 2023-07-18 RX ORDER — ONDANSETRON 2 MG/ML
INJECTION INTRAMUSCULAR; INTRAVENOUS PRN
Status: DISCONTINUED | OUTPATIENT
Start: 2023-07-18 | End: 2023-07-18 | Stop reason: SURG

## 2023-07-18 RX ORDER — ROPIVACAINE HYDROCHLORIDE 5 MG/ML
INJECTION, SOLUTION EPIDURAL; INFILTRATION; PERINEURAL
Status: COMPLETED | OUTPATIENT
Start: 2023-07-18 | End: 2023-07-18

## 2023-07-18 RX ORDER — ACETAMINOPHEN 500 MG
1000 TABLET ORAL ONCE
Status: COMPLETED | OUTPATIENT
Start: 2023-07-18 | End: 2023-07-18

## 2023-07-18 RX ORDER — MIDAZOLAM HYDROCHLORIDE 1 MG/ML
INJECTION INTRAMUSCULAR; INTRAVENOUS PRN
Status: DISCONTINUED | OUTPATIENT
Start: 2023-07-18 | End: 2023-07-18 | Stop reason: SURG

## 2023-07-18 RX ORDER — HYDROMORPHONE HYDROCHLORIDE 1 MG/ML
0.4 INJECTION, SOLUTION INTRAMUSCULAR; INTRAVENOUS; SUBCUTANEOUS
Status: DISCONTINUED | OUTPATIENT
Start: 2023-07-18 | End: 2023-07-18 | Stop reason: HOSPADM

## 2023-07-18 RX ORDER — DEXAMETHASONE SODIUM PHOSPHATE 4 MG/ML
INJECTION, SOLUTION INTRA-ARTICULAR; INTRALESIONAL; INTRAMUSCULAR; INTRAVENOUS; SOFT TISSUE PRN
Status: DISCONTINUED | OUTPATIENT
Start: 2023-07-18 | End: 2023-07-18 | Stop reason: SURG

## 2023-07-18 RX ORDER — HYDROMORPHONE HYDROCHLORIDE 1 MG/ML
0.1 INJECTION, SOLUTION INTRAMUSCULAR; INTRAVENOUS; SUBCUTANEOUS
Status: DISCONTINUED | OUTPATIENT
Start: 2023-07-18 | End: 2023-07-18 | Stop reason: HOSPADM

## 2023-07-18 RX ORDER — ONDANSETRON 4 MG/1
4 TABLET, ORALLY DISINTEGRATING ORAL EVERY 6 HOURS PRN
Qty: 10 TABLET | Refills: 0 | Status: SHIPPED
Start: 2023-07-18 | End: 2024-02-29

## 2023-07-18 RX ORDER — DIPHENHYDRAMINE HYDROCHLORIDE 50 MG/ML
12.5 INJECTION INTRAMUSCULAR; INTRAVENOUS
Status: DISCONTINUED | OUTPATIENT
Start: 2023-07-18 | End: 2023-07-18 | Stop reason: HOSPADM

## 2023-07-18 RX ORDER — ROCURONIUM BROMIDE 10 MG/ML
INJECTION, SOLUTION INTRAVENOUS PRN
Status: DISCONTINUED | OUTPATIENT
Start: 2023-07-18 | End: 2023-07-18 | Stop reason: SURG

## 2023-07-18 RX ORDER — HALOPERIDOL 5 MG/ML
1 INJECTION INTRAMUSCULAR
Status: DISCONTINUED | OUTPATIENT
Start: 2023-07-18 | End: 2023-07-18 | Stop reason: HOSPADM

## 2023-07-18 RX ORDER — ONDANSETRON 2 MG/ML
4 INJECTION INTRAMUSCULAR; INTRAVENOUS
Status: DISCONTINUED | OUTPATIENT
Start: 2023-07-18 | End: 2023-07-18 | Stop reason: HOSPADM

## 2023-07-18 RX ORDER — EPHEDRINE SULFATE 50 MG/ML
5 INJECTION, SOLUTION INTRAVENOUS
Status: DISCONTINUED | OUTPATIENT
Start: 2023-07-18 | End: 2023-07-18 | Stop reason: HOSPADM

## 2023-07-18 RX ORDER — HYDRALAZINE HYDROCHLORIDE 20 MG/ML
5 INJECTION INTRAMUSCULAR; INTRAVENOUS
Status: DISCONTINUED | OUTPATIENT
Start: 2023-07-18 | End: 2023-07-18 | Stop reason: HOSPADM

## 2023-07-18 RX ADMIN — FENTANYL CITRATE 75 MCG: 50 INJECTION, SOLUTION INTRAMUSCULAR; INTRAVENOUS at 07:03

## 2023-07-18 RX ADMIN — DEXAMETHASONE SODIUM PHOSPHATE 4 MG: 4 INJECTION INTRA-ARTICULAR; INTRALESIONAL; INTRAMUSCULAR; INTRAVENOUS; SOFT TISSUE at 06:43

## 2023-07-18 RX ADMIN — ROCURONIUM BROMIDE 50 MG: 50 INJECTION, SOLUTION INTRAVENOUS at 07:03

## 2023-07-18 RX ADMIN — FENTANYL CITRATE 25 MCG: 50 INJECTION, SOLUTION INTRAMUSCULAR; INTRAVENOUS at 08:11

## 2023-07-18 RX ADMIN — ROPIVACAINE HYDROCHLORIDE 20 ML: 5 INJECTION EPIDURAL; INFILTRATION; PERINEURAL at 06:43

## 2023-07-18 RX ADMIN — CLINDAMYCIN PHOSPHATE 900 MG: 150 INJECTION, SOLUTION INTRAMUSCULAR; INTRAVENOUS at 07:27

## 2023-07-18 RX ADMIN — CEFAZOLIN 2 G: 1 INJECTION, POWDER, FOR SOLUTION INTRAMUSCULAR; INTRAVENOUS at 07:07

## 2023-07-18 RX ADMIN — ACETAMINOPHEN 1000 MG: 500 TABLET ORAL at 06:44

## 2023-07-18 RX ADMIN — LIDOCAINE HYDROCHLORIDE 40 MG: 20 INJECTION, SOLUTION EPIDURAL; INFILTRATION; INTRACAUDAL at 07:03

## 2023-07-18 RX ADMIN — ONDANSETRON 4 MG: 2 INJECTION INTRAMUSCULAR; INTRAVENOUS at 08:20

## 2023-07-18 RX ADMIN — PROPOFOL 150 MG: 10 INJECTION, EMULSION INTRAVENOUS at 07:03

## 2023-07-18 RX ADMIN — SODIUM CHLORIDE, POTASSIUM CHLORIDE, SODIUM LACTATE AND CALCIUM CHLORIDE: 600; 310; 30; 20 INJECTION, SOLUTION INTRAVENOUS at 06:59

## 2023-07-18 RX ADMIN — DEXAMETHASONE SODIUM PHOSPHATE 4 MG: 4 INJECTION, SOLUTION INTRA-ARTICULAR; INTRALESIONAL; INTRAMUSCULAR; INTRAVENOUS; SOFT TISSUE at 07:07

## 2023-07-18 RX ADMIN — SUGAMMADEX 200 MG: 100 INJECTION, SOLUTION INTRAVENOUS at 08:23

## 2023-07-18 RX ADMIN — SODIUM CHLORIDE, POTASSIUM CHLORIDE, SODIUM LACTATE AND CALCIUM CHLORIDE: 600; 310; 30; 20 INJECTION, SOLUTION INTRAVENOUS at 06:48

## 2023-07-18 RX ADMIN — MIDAZOLAM 2 MG: 1 INJECTION, SOLUTION INTRAMUSCULAR; INTRAVENOUS at 06:43

## 2023-07-18 ASSESSMENT — PAIN SCALES - GENERAL: PAIN_LEVEL: 3

## 2023-07-18 ASSESSMENT — FIBROSIS 4 INDEX: FIB4 SCORE: 1.17

## 2023-07-18 NOTE — ANESTHESIA POSTPROCEDURE EVALUATION
Patient: Laura Cano    Procedure Summary     Date: 07/18/23 Room / Location:  OR  / SURGERY [facility]    Anesthesia Start: 0659 Anesthesia Stop: 0834    Procedure: Left shoulder scope with rotator cuff repair, biceps tenodesis, subacromial decompression, labral debridement (Left: Shoulder) Diagnosis:       Complete rotator cuff tear of left shoulder      Biceps tendonitis, left      Subacromial impingement of left shoulder      Arthrosis of left acromioclavicular joint      (Complete rotator cuff tear of left shoulder [M75.122])    Surgeons: Dora Cabral M.D. Responsible Provider: Hans Mckenna D.O.    Anesthesia Type: general, peripheral nerve block ASA Status: 2          Final Anesthesia Type: general, peripheral nerve block  Last vitals  BP   Blood Pressure: (!) 193/110    Temp   36.4 °C (97.5 °F)    Pulse   71   Resp   20    SpO2   94 %      Anesthesia Post Evaluation    Patient location during evaluation: PACU  Patient participation: complete - patient participated  Level of consciousness: awake and alert  Pain score: 3    Airway patency: patent  Anesthetic complications: no  Cardiovascular status: hemodynamically stable  Respiratory status: acceptable  Hydration status: euvolemic    PONV: none          No notable events documented.     Nurse Pain Score: 3 (NPRS)

## 2023-07-18 NOTE — DISCHARGE INSTR - OTHER INFO
Discharge instructions:    General Instructions    You will be in a sling at all times for 6 weeks.  You may remove the sling to shower, do your home exercises, and when you are in physical therapy.    You will sleep in your sling - recliners or propping up with pillows works best    No active motion of the arm    Elevate the operative extremity when you are resting to help minimize the swelling.    Use ice to help control the swelling and pain.  DO NOT USE HEAT - this will increase the swelling.    Call the office if you develop fevers (over 100.5) or chills.    You should have an office appointment about 7-10 following your discharge from the hospital.  If you do not please call [phone].      Wound Care    You may shower 2 days after surgery if the wound is dry. Keep water exposure to the incision site brief and blot it dry when you get out.  Do not bathe or swim or Jacuzzi (ie. Do not submerge the incision) for approximately 3 to 4 weeks.    Do not use ointments or creams on the incision.      Keep the incision clean and dry.  Any drainage from the incision should be reported to the doctor immediately.      You may notice some bruising around the incision and into the operative extremity.  This is not uncommon and should begin to go away within the first 2 weeks after surgery.    At the time of surgery tape-like strips (Steri-strips) may be placed on your incision to protect it.  These will eventually come off on their own in one to two weeks, or you may remove them yourself after two weeks.    Activity    Estimated return to work varies depending on the demands of your job.  Some ambitious patients return to desk jobs / administrative type work as early as 1 week after surgery (but usually more like 1 month).  For active labor or heavy labor, it may take 3 to 6 months to return to work.     You should do the exercises given to you at discharge until you return for your post-op visit. At that time, you may  be given a new set of exercises.    You cannot drive while in the sling (for the first 6 weeks)      Medications    You were prescribed a short acting, narcotic pain medication.  It is recommended that you begin to wean off of this medication in about 3 days after surgery.  To help wean off of the pain medications or to supplement your pain control you can use Tylenol to help with pain.    You were prescribed an anti-inflammatory medication which you will take for 5 days after surgery.  Take with food if GI discomfort occurs.      You were prescribed an anti-nausea medication that you may take as needed.    You will not be discharged from the hospital with any antibiotics unless there are specific concerns regarding infection.

## 2023-07-18 NOTE — LETTER
June 16, 2023    Patient Name: Laura Cano  Surgeon Name: Dora Cabral M.D.  Surgery Facility: [facility] ([address] [facility])  Surgery Date: 7/18/2023    The time of your surgery is not final and may change up to and until the day of your surgery. You will be contacted 24-48 hours prior to your surgery date with your check-in and surgery time.    If you will not be at one of the below numbers please call the surgery scheduler at [phone]  Preferred Phone: [phone]    BEFORE YOUR SURGERY   Pre Registration and/or Lab Work must be done within and no earlier than 28 days prior to your surgery date. Please call [facility] at [phone] for an appointment as soon as possible.    DAY OF YOUR SURGERY  Nothing to eat or drink after midnight     Refrain from smoking any substance after midnight prior to surgery. Smoking may interfere with the anesthetic and frequently produces nausea during the recovery period.    Continue taking all lifesaving medications. Including the morning of your surgery with small sip of water.    Please do NOT take on the day of surgery:  Diuretics: examples- furosemide (Lasix), spironolactone, hydrochlorothiazide  ACE-inhibitors: examples- lisinopril, ramipril, enalapril  “ARBs”: examples- losartan, Olmesartan, valsartan    Please arrive at the hospital/surgery center at the check-in time provided.     An adult will need to bring you and take you home after your surgery.     AFTER YOUR SURGERY  Post op Appointment:   Date: 07/28/2023   Time: 10:00AM   With: Dora Cabral MD   Location: [address] [place] [address]    - Therapy- Your first appointment should be 2-3  day(s) after your surgery. For your convenience we have 4 Physical Therapy locations: [facility], [place], and [facility]. Call our office ASAP to schedule an appointment at [phone] or take the enclosed Therapy  Prescription to a facility of your choice.  - Post Surgery - You will need someone to drive you home  - Post Surgery - You will need someone to stay with you for 24 hours  - You must have someone provide transportation post surgery and someone to monitor you for at least 24 hours post-surgery. If you don't have either of these your appointment will be canceled.     TIME OFF WORK  FMLA or Disability forms can be faxed directly to: [phone] or you may drop them off at [address] [address]. Our office charges a $35.00 fee per form. Forms will be completed within 10 business days of drop off and payment received. For the status of your forms you may contact our disability office directly [phone].    MEDICATION INSTRUCTIONS **Please read section completely**    The following medications should be stopped a minimum of 10 days prior to surgery:  All over the counter, Supplements & Herbal medications    Anorectics: Phentermine (Adipex-P, Lomaira and Suprenza), Phentermine-topiramate (Qsymia), Bupropion-naltrexone (Contrave)    Opiod Partial Agonists/Opioid Antagonists: Buprenorphine (Subocone, Belbuca, Butrans, Probuphine Implant, Sublocade), Naltrexone (ReVia, Vivitrol), Naloxone    Amphetamines: Dextroamphetamine/Amphetamine (Adderall, Mydayis), Methylphenidate Hydrochloride (Concerta, Metadate, Methylin, Ritalin)    The following medications should be stopped 5 days prior to surgery:  Blood Thinners: Any Aspirin, Aspirin products, anti-inflammatories such as ibuprofen and any blood thinners such as Coumadin and Plavix. Please consult your prescribing physician if you are on life saving blood thinners, in regards to when to stop medications prior to surgery.     The following medications should be stopped a minimum of 3 days prior to surgery:  PDE-5 inhibitors: Sildenafil (Viagra), Tadalafil (Cialis), Vardenafil (Levitra), Avanafil (Stendra)    MAO Inhibitors: Rasagiline (Azilect), Selegiline  (Eldepryl, Emsam, Selapar), Isocarboxazid (Marplan), Phenelzine (Nardil)

## 2023-07-18 NOTE — OR SURGEON
Immediate Post OP Note    PreOp Diagnosis: Left shoulder rotator cuff tear, biceps tendinitis, subacromial impingement      PostOp Diagnosis: Same      Procedure(s):  Left shoulder scope with rotator cuff repair, biceps tenodesis, subacromial decompression, labral debridement - Wound Class: Clean    Surgeon(s):  Dora Cabral M.D.    Anesthesiologist/Type of Anesthesia:  Anesthesiologist: Hans Mckenna D.O./General    Surgical Staff:  Circulator: Manju Cisse R.N.  Relief Circulator: Kirit Hayward R.N.  Scrub Person: Sylvain Andrade    Specimens removed if any:  * No specimens in log *    Estimated Blood Loss: 10 mL    Findings: There was a full-thickness rotator cuff tear and biceps tendinitis.    Complications: None        7/18/2023 8:35 AM Dora Cabral M.D.

## 2023-07-18 NOTE — ANESTHESIA PROCEDURE NOTES
Airway    Date/Time: 7/18/2023 7:04 AM    Performed by: Hans Mckenna D.O.  Authorized by: Hans Mckenna D.O.    Location:  OR  Urgency:  Elective  Difficult Airway: No    Indications for Airway Management:  Anesthesia      Spontaneous Ventilation: absent    Sedation Level:  Deep  Preoxygenated: Yes    Patient Position:  Sniffing  Mask Difficulty Assessment:  2 - vent by mask + OA or adjuvant +/- NMBA  Final Airway Type:  Endotracheal airway  Final Endotracheal Airway:  ETT  Cuffed: Yes    Technique Used for Successful ETT Placement:  Direct laryngoscopy    Insertion Site:  Oral  Blade Type:  Reyna  Laryngoscope Blade/Videolaryngoscope Blade Size:  3  ETT Size (mm):  7.0  Measured from:  Teeth  ETT to Teeth (cm):  21  Placement Verified by: auscultation and capnometry    Cormack-Lehane Classification:  Grade IIa - partial view of glottis  Number of Attempts at Approach:  1

## 2023-07-18 NOTE — H&P
Surgery Orthopedic History & Physical Note    Date  7/18/2023    Primary Care Physician  Louis Meza D.O.      Pre-Op Diagnosis Codes:     * Complete rotator cuff tear of left shoulder [M75.122]     * Biceps tendonitis, left [M75.22]     * Subacromial impingement of left shoulder [M75.42]     * Arthrosis of left acromioclavicular joint [M19.012]    AMINA Sanchez is a returns 57-year-old active female who presents to clinic today with worsening left shoulder pain and difficulty moving her arm.  She states that on May 2, 2023, she was running outside to bring her new succulent plants in from outside as it had started snowing and she slipped and fell hard directly onto her left shoulder and left side.  She had trouble breathing and severe pain after this fall, so she went to the emergency room where she was diagnosed with rib fractures.  Since the fall, she has had persistent and severe left shoulder pain and she points to the lateral aspect of the left shoulder as the most severe area of pain.  Since I last saw her, she actually has had worsening pain and decreased motion.      Past Medical History:   Diagnosis Date    Acne 04/16/2012    Arthritis 09/16/2022    Right elbow, right knee and right ankle    Breast cancer (HCC) 2021    Cancer (HCC) 09/16/2022    breast cancer 7/2021    Dental disorder 09/16/2022    flipper    Dysuria 04/16/2012    Family history of colon cancer 04/16/2012    HTN (hypertension) 04/16/2012    Hypertension     Microcytic anemia 08/22/2012    Thrombosed external hemorrhoid 10/24/2012    Vitamin d deficiency 04/16/2012       Past Surgical History:   Procedure Laterality Date    DE LAP, PORTER RESTRICT PROC, LONGITUDINAL GAS*  10/5/2022    Procedure: LAPAROSCOPIC SLEEVE GASTRECTOMY;  Surgeon: Jeison Cano M.D.;  Location: SURGERY [facility];  Service: General    PB MASTECTOMY, PARTIAL Right 5/19/2021    Procedure: MASTECTOMY, PARTIAL - SHER PLACEMENT;  Surgeon: Danielle Coulter M.D.;   Location: SURGERY SAME DAY [facility];  Service: General    NODE BIOPSY SENTINEL Right 2021    Procedure: BIOPSY, LYMPH NODE, SENTINEL - AXILLARY AFTER INJECTION;  Surgeon: Danielle Coulter M.D.;  Location: SURGERY SAME DAY [facility];  Service: General    LUMPECTOMY Right 2021    IDC    IA RADIATION THERAPY PLAN SIMPLE Right     TONSILLECTOMY      TUBAL LIGATION         Current Facility-Administered Medications   Medication Dose Route Frequency Provider Last Rate Last Admin    acetaminophen (Tylenol) tablet 1,000 mg  1,000 mg Oral Once Hans Mckenna D.O.        ceFAZolin (Ancef) injection 2 g  2 g Intravenous Once Dora Cabral M.D.        lactated ringers infusion   Intravenous Continuous Dora Cabral M.D.           Social History     Socioeconomic History    Marital status: Single     Spouse name: Not on file    Number of children: 3    Years of education: Not on file    Highest education level: Not on file   Occupational History    Occupation:      Employer: [employer]   Tobacco Use    Smoking status: Never    Smokeless tobacco: Never   Vaping Use    Vaping Use: Never used   Substance and Sexual Activity    Alcohol use: Not Currently    Drug use: No    Sexual activity: Yes     Partners: Male   Other Topics Concern    Not on file   Social History Narrative    Employed as .    In process of adopting 15 yo boy. Was in process of adopting his older sister but she  of complications of DM1 prior to completion.    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     Social Determinants of Health     Financial Resource Strain: Not on file   Food Insecurity: Not on file   Transportation Needs: Not on file   Physical Activity: Not on file   Stress: Not on file   Social Connections: Not on file   Intimate Partner Violence: Not on file   Housing Stability: Not on file       Family History   Problem Relation Age of Onset    Lung Disease Father         emphysema    Heart Disease Father          renny MI age 67    Hypertension Father     Lung Disease Mother         emphysema    Osteoporosis Mother     Diabetes Brother        Allergies  Sulfa drugs, Nsaids, and Oxycodone    Review of Systems  Negative except for left shoulder pain    Physical Exam    Vital Signs  Blood Pressure: (!) 193/110   Temperature: 36.4 °C (97.5 °F)   Pulse: 71   Respiration: 20   Pulse Oximetry: 94 %   PHYSICAL EXAM:  VITALS: LMP  (LMP Unknown) Comment: October 2020  GENERAL: A+Ox3. INAD. Well appearing and well groomed.  MENTAL STATUS: Pleasant and cooperative. Alert and oriented x3 with normal mood and affect.  Vascular: Pulses are palpable 2+, capillary refills to digits are normal.  Skin: No wounds/lesions/ulcers. Skin warm & dry.  Respiratory: No respiratory distress     MUSCULOSKELETAL:    Left UPPER EXTREMITY:   Normal posture.  Shoulder: No swelling.  Active Range of Motion: Forward Flexion 90, Abduction 9060 ER 45, IR to hip; she has a very painful arc of motion  Passive is symmetric to active.  Strength: 5-/5 Supraspinatus, 5-/5 External Rotators, 5/5 Subscapularis  Biceps: Tender to palpation, positive [place]'s, positive speeds  Tenderness: Tender over the anterior and lateral shoulder.  Very tender over the AC joint  Impingement Signs: Positive Guidry, positive Neer.  Cross body adduction pain  Negative belly press, positive empty can  Stability: Normal.  Sensation intact to light touch in the bilateral upper extremities.  2+ radial pulses.     ==========     IMAGING FINDINGS:      I personally reviewed the images independent of the radiology read.     Radiographs of the left shoulder from [facility] from May 9, 2023 were reviewed by me today.  The radiographs show no acute fracture or dislocation of the left shoulder but she does have some acromioclavicular degenerative changes.     Radiographs of the left humerus from [facility] from May 9, 2023 was reviewed by me today.  There is no acute osseous abnormality, fracture or  dislocation.     MRI of the left shoulder from [facility] from May 25, 2023 was reviewed by me today.  These images demonstrate mild to moderate rotator cuff tendinopathy with a near full-thickness tear of the supraspinatus footprint.  There appears to be some mild to moderate intra-articular biceps tendinopathy without tear.  There appears to be a SLAP tear.  There is some mild AC joint osteoarthritis appreciated.  There is some mild glenohumeral joint chondrosis appreciated.     ==========     ASSESSMENT & PLAN: Laura is a 57-year-old female with left shoulder pain with imaging and exam consistent with a diagnosis of a left shoulder rotator cuff tear, biceps tendinitis, subacromial impingement and bursitis, and acromioclavicular arthrosis.     -I discussed these findings at length with Laura today.  Again, we do recommend surgery given her failure of conservative management and severe worsening of pain.  -My surgical recommendation is a left shoulder scope with rotator cuff repair, biceps tenodesis, subacromial decompression, and distal clavicle excision.        It was explained to the patient that any surgical procedure carries with it the risks of loss of limb or loss of life. Medical complications include but are not limited to death or disability from a heart attack, stroke, GI bleeding, thrombophlebitis and pulmonary embolism, sepsis, adverse reactions (death) due to blood transfusions, allergy or adverse drug reaction. There are other rare, unknown and uncommon systemic conditions that could also adversely affect the systemic outcome. Local complications include but are not limited to wound dehiscence, deep infection, failure of fixation or reconstruction, damage to nerves and vessels which could be temporary or permanent, local recurrence as well as other rare, uncommon and unknown local complications that may necessitate re-operation, more complex orthopaedic reconstructions or amputation. The  Patient was informed, questions were answered, and the consents were signed.  They understood the procedure and despite the risks decided to proceed with surgery.     -Patient was educated on clinical and imaging findings.  -Patient verbalizes understanding of all discussions today, and all questions were answered satisfactorily.        Dora Cabral MD

## 2023-07-18 NOTE — DISCHARGE INSTRUCTIONS
What to Expect Post Anesthesia    Rest and take it easy for the first 24 hours.  A responsible adult is recommended to remain with you during that time.  It is normal to feel sleepy.  We encourage you to not do anything that requires balance, judgment or coordination.    FOR 24 HOURS DO NOT:  Drive, operate machinery or run household appliances.  Drink beer or alcoholic beverages.  Make important decisions or sign legal documents.    To avoid nausea, slowly advance diet as tolerated, avoiding spicy or greasy foods for the first day.  Add more substantial food to your diet according to your provider's instructions.  Babies can be fed formula or breast milk as soon as they are hungry.  INCREASE FLUIDS AND FIBER TO AVOID CONSTIPATION.    MILD FLU-LIKE SYMPTOMS ARE NORMAL.  YOU MAY EXPERIENCE GENERALIZED MUSCLE ACHES, THROAT IRRITATION, HEADACHE AND/OR SOME NAUSEA.    If any questions arise, call your provider.  If your provider is not available, please feel free to call the Surgical Center at [phone].    MEDICATIONS: Resume taking daily medication.  Take prescribed pain medication with food.  If no medication is prescribed, you may take non-aspirin pain medication if needed.  PAIN MEDICATION CAN BE VERY CONSTIPATING.  Take a stool softener or laxative such as senokot, pericolace, or milk of magnesia if needed.    Last pain medication given: None      Peripheral Nerve Block Discharge Instructions from Same Day Surgery and Inpatient :    What to Expect - Upper Extremity  You may experience numbness and weakness in shoulder, arm, and hand  on the same side as your surgery  This is normal. For some people, this may be an unpleasant sensation. Be very careful with your numb limb  Ask for help when you need it  Shoulder Surgery Side Effects  In addition to numbness and weakness you may experience other symptoms  Other nerves that are close to those nerves injected can also be affected by local anesthesia  You may  "experience a hoarseness in your voice  Your breathing may feel different  You may also notice drooping of your eyelid, pupil constriction, and decreased sweating, on the side of your surgery  All of these side effects are normal and will resolve when the local anesthetic wears off   Prevent Injury  Protect the limb like a baby  Beware of exposing your limb to extreme heat or cold or trauma  The limb may be injured without you noticing because it is numb  Keep the limb elevated whenever possible  Do not sleep on the limb  Change the position of the limb regularly  Avoid putting pressure on your surgical limb  Pain Control  The initial block on the day of surgery will make your extremity feel \"numb\"  Any consecutive injection including prior to discharge from the hospital will make your extremity feel \"numb\"  You may feel an aching or burning when the local anesthesia starts to wear off  Take pain pills as prescribed by your surgeon  Call your surgeon or anesthesiologist if you do not have adequate pain control    "

## 2023-07-18 NOTE — OR NURSING
0856: report received. Pt resting in [facility]. No c/o pain. No nausea. Dressing CDI. VSS on RA    0857: family updated    0902: pt meets criteria for phase II. Report called to receiving RN

## 2023-07-18 NOTE — ANESTHESIA PREPROCEDURE EVALUATION
Case: [id number] Date/Time: 07/18/23 0645    Procedure: Left shoulder scope with rotator cuff repair, biceps tenodesis, subacromial decompression, labral debridement (Left)    Diagnosis:       Complete rotator cuff tear of left shoulder [M75.122]      Biceps tendonitis, left [M75.22]      Subacromial impingement of left shoulder [M75.42]      Arthrosis of left acromioclavicular joint [M19.012]    Pre-op diagnosis: Complete rotator cuff tear of left shoulder [M75.122]    Location:  OR 02 / SURGERY [facility]    Surgeons: Dora Cabral M.D.          Relevant Problems   NEURO   (positive) Other headache syndrome      CARDIAC   (positive) HTN (hypertension)      GI   (positive) Chronic GERD      Other   (positive) Arthritis       Physical Exam    Airway   Mallampati: II  TM distance: >3 FB  Neck ROM: full       Cardiovascular - normal exam  Rhythm: regular  Rate: normal  (-) murmur     Dental - normal exam           Pulmonary - normal exam  Breath sounds clear to auscultation     Abdominal    Neurological - normal exam                 Anesthesia Plan    ASA 2       Plan - general and peripheral nerve block     Peripheral nerve block will be post-op pain control  Airway plan will be ETT          Induction: intravenous    Postoperative Plan: Postoperative administration of opioids is intended.    Pertinent diagnostic labs and testing reviewed    Informed Consent:    Anesthetic plan and risks discussed with patient.    Use of blood products discussed with: patient whom consented to blood products.

## 2023-07-18 NOTE — ANESTHESIA TIME REPORT
Anesthesia Start and Stop Event Times     Date Time Event    7/18/2023 0615 Ready for Procedure     0659 Anesthesia Start     0834 Anesthesia Stop        Responsible Staff  07/18/23    Name Role Begin End    Hans Mckenna D.O. Anesth 0659 0834        Overtime Reason:  no overtime (within assigned shift)    Comments:                                                       T&S done/no Lip Wedge Excision Repair Text: Given the location of the defect and the proximity to free margins a full thickness wedge repair was deemed most appropriate.  Using a sterile surgical marker, the appropriate repair was drawn incorporating the defect and placing the expected incisions perpendicular to the vermilion border.  The vermilion border was also meticulously outlined to ensure appropriate reapproximation during the repair.  The area thus outlined was incised through and through with a #15 scalpel blade.  The muscularis and dermis were reaproximated with deep sutures following hemostasis. Care was taken to realign the vermilion border before proceeding with the superficial closure.  Once the vermilion was realigned the superfical and mucosal closure was finished.

## 2023-07-18 NOTE — OP REPORT
DATE OF SERVICE: July 18, 2023     PREOPERATIVE DIAGNOSES:  1.  Left shoulder rotator cuff tear.  2.  Left shoulder biceps tendonitis.  3.  Left shoulder subacromial impingement      POSTOPERATIVE DIAGNOSES:  1.  Left shoulder rotator cuff tear.  2.  Left shoulder biceps tendonitis.  3.  Left shoulder subacromial impingement   4.  Left shoulder extensive glenohumeral synovitis, tearing of the anterior and superior labrum, and subacromial bursitis.     PROCEDURE PERFORMED:  1. Left shoulder diagnostic arthroscopy.  2. Left shoulder arthroscopic rotator cuff repair.  3. Left shoulder open subpectoral biceps tenodesis  4. Left  shoulder subacromial decompression.  5. Left shoulder extensive debridement of glenohumeral synovitis, debridement of the anterior and superior labrum, and subacromial bursectomy.     ATTENDING SURGEON:  Dora Cabral MD     ASSISTANT: Alcon Combs PA-C     ANESTHESIA:  General with an interscalene nerve block.     ANESTHESIOLOGIST: Jonathan Mckenna DO     SPECIMENS:  None.     ESTIMATED BLOOD LOSS:  10 mL     FINDINGS:  There was a full-thickness rotator cuff tear of the anterior edge of the supraspinatus.  There was biceps tendinitis with biceps anchor tearing and tearing of the anterior and superior labrum.  There was extensive subacromial impingement and bursitis.       COMPLICATIONS:  None.     IMPLANTS:    Mills iconix 2 anchor x1 for biceps tenodesis  Agapito 4.75 mm triple loaded alphavent anchor x1 for rotator cuff repair     INDICATIONS:  The patient is a 57-year-old female who presented to clinic with worsening left shoulder pain and weakness.  The patient had significant weakness after falling as well as pain.  An MRI revealed a rotator cuff tear.  Given the patient's continued pain and dysfunction and failure of non operative management, the patient was indicated for surgery.  I had a long discussion with the patient regarding the risks and benefits of surgery including but  not limited to bleeding, infection, risk to surrounding structures such as blood vessels and nerves, pain, scar, reoperation, hardware failure, risk with anesthesia such as stroke, heart attack, deep venous thrombosis, pulmonary embolism and death.  The patient understood the risks and benefits and elected to proceed with surgery.     PROCEDURE IN DETAIL:  The patient was met in the preoperative hold area where the correct left upper extremity was marked with my initials. The patient then underwent an interscalene nerve block under ultrasound guidance by the anesthesia team. The patient was transferred to the operating room where she was placed supine on the operating room table with all bony prominences well padded.  The patient received 2 g of preoperative Ancef and 900 mg clindamycin. The patient was intubated by the anesthesia team without complications. The patient was then placed in the beach chair position again with all bony prominences well padded.  Preoperative exam under anesthesia revealed full range of motion of the left shoulder with forward flexion to 180 degrees, abduction to 180 degrees, external rotation to 45 degrees.  The patient's left upper extremity was then prepped and draped in the usual sterile fashion.  A preoperative timeout was held where all those in the room agreed upon the correct patient, the correct site of surgery and the correct surgery to be performed.     I began the procedure by using an #11 blade to make my posterior portal and inserted the scope into the glenohumeral joint.  Upon entering the glenohumeral joint, there was significant erythema.  I then made my anterior portal under direct visualization using a spinal needle.   The patient had tearing at the biceps anchor on the labrum as well as significant biceps tendinitis with a positive lipstick sign when the biceps was pulled into the joint.  I used the arthroscopic biter to perform my biceps tenotomy.  I used the 4.0 mm  sucker shaver to debride the biceps tendon stump. There was tearing of the labrum anteriorly and superiorly as well as extensive glenohumeral synovitis.  I then used the 4.0mm sucker shaver to perform an extensive labral debridement and to debride the glenohumeral synovitis.  There were no loose bodies in the axillary recess.  The subscapularis was intact and pristine.  With the arm slightly externally rotated, I examined the rest of the rotator cuff and noted an essentially full-thickness tear of the anterior edge of the supraspinatus.  This area was marked with an 0 PDS suture for further examination from the bursal side.  The cartilage of the glenohumeral joint was intact.  The scope was then removed from the shoulder and I turned my attention towards the subpectoral biceps tenodesis.     I made a 3 cm incision in the axillary fold for my subpectoral biceps tenodesis.  I dissected down through skin and subcutaneous tissues using Bovie electrocautery for hemostasis.  I identified my inferior border of the pectoralis and this was retracted superiorly.  Hohmann retractor was placed laterally on the lateral aspect of the humerus and I identified my bicipital groove.  A right angle snap was used to retrieve my biceps tendon within the bicipital groove and the synovium was cleared off for my tenodesis.  I then used a Kendall iconix 2 suture anchor unicortically within the bicipital groove and then used a free needle to pass the sutures through my biceps tendon for tenodesis.  The proximal segment of the tendon was removed and passed off and the tendon was tied down to the anchor without complications.   Next, I copiously irrigated out the subpectoral incision with normal saline and performed a layered closure using 3-0 Monocryl followed by a running 3-0 Monocryl.       The scope was then inserted into the subacromial space and I established my lateral portal under direct visualization with a spinal needle.  There was  thickened and inflamed subacromial bursa, so the 4.0mm sucker shaver and SERFAS ablater were used to perform a subacromial bursectomy.  There was a large anterior osteophyte on the undersurface of the acromion causing impingement.  I then performed a subacromial decompression using the 5.5 mm resector and SERFAS ablater turning her type II acromion into a type I acromion.  Visualizing the rotator cuff from the bursal side again showed a very thin area of rotator cuff on the anterior aspect of the supraspinatus which had previously been marked with the 0 PDS suture.  Using the trocar, I palpated this area and it was an essentially full-thickness tear.  I then used the 4.0 mm sucker shaver to debride the thin remaining cuff tissue back to a stable edge.  I then used the 4.0 mm sucker shaver to bur the bone at the greater tuberosity to allow for bleeding after I placed my anchors.  I then placed one Agapito 4.75 mm triple loaded alpha vent suture anchor percutaneously on the greater tuberosity.  The anchor was inserted under direct visualization, so it did not penetrate the humeral head.  I then used a suture passer to pass my sutures from anterior to posterior and these were tied in a simple fashion.  This reduced the tendon back down to the greater tuberosity well with no significant tension on the tendon.  I then copiously irrigated out the shoulder with arthroscopic fluid and the scope was removed from the shoulder.    The wounds were copiously irrigated and a 3-0 Prolene was used to suture the portals and a sterile dressing was applied.  The patient's operative extremity was placed in a shoulder abduction sling and awoken from anesthesia without complications.  Please note that all counts were correct at the end of the case and Alcon Combs PA-C, was necessary and critical for all portions of the procedure including retraction, suture management, visualization, and positioning and without their help, the  surgery would not have been as technically successful.        Dora Cabral MD

## 2023-07-18 NOTE — OR NURSING
0645 - Procedure, patient allergies, and NPO status verified.  Home med rec completed and belongings secured. Patient verbalizes understanding of pain scale, expected course of stay and plan of care.  Surgical site verified with pt.     Pt refused urine and blood pregnancy testing, Dr Mckenna aware.    Pt b/p high, recheck 192/99 - Dr Mckenna aware.    IV access discussed, Dr Mckenna requested lower extrmity IV r/t R arm lymphedema. Ultrasound guided R calf IV established.  Noel mccollum/SCD placed on L leg.

## 2023-07-18 NOTE — OR NURSING
0907: To stage ll. Up to chair and dressed w/ CNA assist. Pain/nausea free. Dressing is c/d/I. Awaiting ride.    1008: Home care instructions reviewed w/ pt and . No questions. Meets criteria for discharge.

## 2023-07-18 NOTE — OR NURSING
0832: To PACU post left shoulder arthroscopy w/ block. OPA dc'd on arrival. Breathing is spontaneous and unlabored. Strong radial pulse observed on operative extremity. Ice applied. States pain of 2/10.    0849: Pain remains tolerable at 2/10, no nausea.    0856: Report given to RONALD Malone RN.

## 2023-08-31 ENCOUNTER — OFFICE VISIT (OUTPATIENT)
Dept: MEDICAL GROUP | Facility: MEDICAL CENTER | Age: 58
End: 2023-08-31
Payer: COMMERCIAL

## 2023-08-31 VITALS
WEIGHT: 184 LBS | DIASTOLIC BLOOD PRESSURE: 64 MMHG | BODY MASS INDEX: 27.25 KG/M2 | HEART RATE: 81 BPM | TEMPERATURE: 98.2 F | OXYGEN SATURATION: 98 % | HEIGHT: 69 IN | SYSTOLIC BLOOD PRESSURE: 124 MMHG

## 2023-08-31 DIAGNOSIS — I89.0 LYMPHEDEMA OF RIGHT ARM: ICD-10-CM

## 2023-08-31 DIAGNOSIS — S46.012D TRAUMATIC COMPLETE TEAR OF LEFT ROTATOR CUFF, SUBSEQUENT ENCOUNTER: Chronic | ICD-10-CM

## 2023-08-31 DIAGNOSIS — J30.2 SEASONAL ALLERGIES: ICD-10-CM

## 2023-08-31 DIAGNOSIS — Z98.84 S/P LAPAROSCOPIC SLEEVE GASTRECTOMY: ICD-10-CM

## 2023-08-31 DIAGNOSIS — Z85.3 HISTORY OF BREAST CANCER: ICD-10-CM

## 2023-08-31 DIAGNOSIS — K21.9 CHRONIC GERD: ICD-10-CM

## 2023-08-31 PROBLEM — M75.22 BICEPS TENDONITIS, LEFT: Status: RESOLVED | Noted: 2023-06-16 | Resolved: 2023-08-31

## 2023-08-31 PROBLEM — Z00.00 PREVENTATIVE HEALTH CARE: Status: RESOLVED | Noted: 2023-03-31 | Resolved: 2023-08-31

## 2023-08-31 PROBLEM — Z01.818 PRE-OP EVALUATION: Status: RESOLVED | Noted: 2023-07-12 | Resolved: 2023-08-31

## 2023-08-31 PROBLEM — C50.911 INFILTRATING DUCTAL CARCINOMA OF RIGHT BREAST (HCC): Status: RESOLVED | Noted: 2021-05-05 | Resolved: 2023-08-31

## 2023-08-31 PROBLEM — S22.42XD CLOSED FRACTURE OF MULTIPLE RIBS OF LEFT SIDE WITH ROUTINE HEALING: Status: RESOLVED | Noted: 2023-05-08 | Resolved: 2023-08-31

## 2023-08-31 PROBLEM — M75.122 COMPLETE ROTATOR CUFF TEAR OF LEFT SHOULDER: Chronic | Status: ACTIVE | Noted: 2023-06-16

## 2023-08-31 PROBLEM — M19.012 ARTHROSIS OF LEFT ACROMIOCLAVICULAR JOINT: Status: RESOLVED | Noted: 2023-06-16 | Resolved: 2023-08-31

## 2023-08-31 PROBLEM — Z17.0 MALIGNANT NEOPLASM OF UPPER-OUTER QUADRANT OF RIGHT BREAST IN FEMALE, ESTROGEN RECEPTOR POSITIVE (HCC): Status: RESOLVED | Noted: 2021-06-08 | Resolved: 2023-08-31

## 2023-08-31 PROBLEM — H65.91 FLUID LEVEL BEHIND TYMPANIC MEMBRANE OF RIGHT EAR: Status: RESOLVED | Noted: 2021-03-02 | Resolved: 2023-08-31

## 2023-08-31 PROBLEM — C50.411 MALIGNANT NEOPLASM OF UPPER-OUTER QUADRANT OF RIGHT BREAST IN FEMALE, ESTROGEN RECEPTOR POSITIVE (HCC): Status: RESOLVED | Noted: 2021-06-08 | Resolved: 2023-08-31

## 2023-08-31 PROBLEM — L98.9 SKIN LESION OF LEFT LEG: Status: RESOLVED | Noted: 2023-07-12 | Resolved: 2023-08-31

## 2023-08-31 PROBLEM — M79.602 LEFT ARM PAIN: Status: RESOLVED | Noted: 2023-05-09 | Resolved: 2023-08-31

## 2023-08-31 PROBLEM — M75.20 BICEPS TENDONITIS: Status: RESOLVED | Noted: 2023-05-26 | Resolved: 2023-08-31

## 2023-08-31 PROBLEM — M25.512 ACUTE PAIN OF LEFT SHOULDER: Status: RESOLVED | Noted: 2023-05-09 | Resolved: 2023-08-31

## 2023-08-31 PROBLEM — M75.102 LEFT ROTATOR CUFF TEAR: Status: RESOLVED | Noted: 2023-05-26 | Resolved: 2023-08-31

## 2023-08-31 PROCEDURE — 99214 OFFICE O/P EST MOD 30 MIN: CPT | Performed by: BEHAVIOR ANALYST

## 2023-08-31 PROCEDURE — 3074F SYST BP LT 130 MM HG: CPT | Performed by: BEHAVIOR ANALYST

## 2023-08-31 PROCEDURE — 3078F DIAST BP <80 MM HG: CPT | Performed by: BEHAVIOR ANALYST

## 2023-08-31 SDOH — HEALTH STABILITY: PHYSICAL HEALTH: ON AVERAGE, HOW MANY DAYS PER WEEK DO YOU ENGAGE IN MODERATE TO STRENUOUS EXERCISE (LIKE A BRISK WALK)?: 4 DAYS

## 2023-08-31 SDOH — ECONOMIC STABILITY: HOUSING INSECURITY
IN THE LAST 12 MONTHS, WAS THERE A TIME WHEN YOU DID NOT HAVE A STEADY PLACE TO SLEEP OR SLEPT IN A SHELTER (INCLUDING NOW)?: PATIENT REFUSED

## 2023-08-31 SDOH — HEALTH STABILITY: PHYSICAL HEALTH: ON AVERAGE, HOW MANY MINUTES DO YOU ENGAGE IN EXERCISE AT THIS LEVEL?: PATIENT DECLINED

## 2023-08-31 SDOH — ECONOMIC STABILITY: HOUSING INSECURITY

## 2023-08-31 SDOH — ECONOMIC STABILITY: INCOME INSECURITY: IN THE LAST 12 MONTHS, WAS THERE A TIME WHEN YOU WERE NOT ABLE TO PAY THE MORTGAGE OR RENT ON TIME?: PATIENT REFUSED

## 2023-08-31 SDOH — ECONOMIC STABILITY: FOOD INSECURITY: WITHIN THE PAST 12 MONTHS, THE FOOD YOU BOUGHT JUST DIDN'T LAST AND YOU DIDN'T HAVE MONEY TO GET MORE.: PATIENT DECLINED

## 2023-08-31 SDOH — ECONOMIC STABILITY: TRANSPORTATION INSECURITY
IN THE PAST 12 MONTHS, HAS LACK OF TRANSPORTATION KEPT YOU FROM MEETINGS, WORK, OR FROM GETTING THINGS NEEDED FOR DAILY LIVING?: PATIENT DECLINED

## 2023-08-31 SDOH — ECONOMIC STABILITY: TRANSPORTATION INSECURITY
IN THE PAST 12 MONTHS, HAS LACK OF RELIABLE TRANSPORTATION KEPT YOU FROM MEDICAL APPOINTMENTS, MEETINGS, WORK OR FROM GETTING THINGS NEEDED FOR DAILY LIVING?: PATIENT DECLINED

## 2023-08-31 SDOH — ECONOMIC STABILITY: FOOD INSECURITY: WITHIN THE PAST 12 MONTHS, YOU WORRIED THAT YOUR FOOD WOULD RUN OUT BEFORE YOU GOT MONEY TO BUY MORE.: PATIENT DECLINED

## 2023-08-31 SDOH — ECONOMIC STABILITY: TRANSPORTATION INSECURITY
IN THE PAST 12 MONTHS, HAS THE LACK OF TRANSPORTATION KEPT YOU FROM MEDICAL APPOINTMENTS OR FROM GETTING MEDICATIONS?: PATIENT DECLINED

## 2023-08-31 SDOH — ECONOMIC STABILITY: INCOME INSECURITY: HOW HARD IS IT FOR YOU TO PAY FOR THE VERY BASICS LIKE FOOD, HOUSING, MEDICAL CARE, AND HEATING?: PATIENT DECLINED

## 2023-08-31 SDOH — HEALTH STABILITY: MENTAL HEALTH
STRESS IS WHEN SOMEONE FEELS TENSE, NERVOUS, ANXIOUS, OR CAN'T SLEEP AT NIGHT BECAUSE THEIR MIND IS TROUBLED. HOW STRESSED ARE YOU?: TO SOME EXTENT

## 2023-08-31 ASSESSMENT — SOCIAL DETERMINANTS OF HEALTH (SDOH)
HOW OFTEN DO YOU HAVE A DRINK CONTAINING ALCOHOL: MONTHLY OR LESS
HOW HARD IS IT FOR YOU TO PAY FOR THE VERY BASICS LIKE FOOD, HOUSING, MEDICAL CARE, AND HEATING?: PATIENT DECLINED
HOW OFTEN DO YOU ATTENT MEETINGS OF THE CLUB OR ORGANIZATION YOU BELONG TO?: PATIENT DECLINED
HOW OFTEN DO YOU ATTEND CHURCH OR RELIGIOUS SERVICES?: MORE THAN 4 TIMES PER YEAR
ARE YOU MARRIED, WIDOWED, DIVORCED, SEPARATED, NEVER MARRIED, OR LIVING WITH A PARTNER?: PATIENT DECLINED
HOW MANY DRINKS CONTAINING ALCOHOL DO YOU HAVE ON A TYPICAL DAY WHEN YOU ARE DRINKING: 1 OR 2
DO YOU BELONG TO ANY CLUBS OR ORGANIZATIONS SUCH AS CHURCH GROUPS UNIONS, FRATERNAL OR ATHLETIC GROUPS, OR SCHOOL GROUPS?: NO
IN A TYPICAL WEEK, HOW MANY TIMES DO YOU TALK ON THE PHONE WITH FAMILY, FRIENDS, OR NEIGHBORS?: MORE THAN THREE TIMES A WEEK
HOW OFTEN DO YOU ATTEND CHURCH OR RELIGIOUS SERVICES?: MORE THAN 4 TIMES PER YEAR
IN A TYPICAL WEEK, HOW MANY TIMES DO YOU TALK ON THE PHONE WITH FAMILY, FRIENDS, OR NEIGHBORS?: MORE THAN THREE TIMES A WEEK
DO YOU BELONG TO ANY CLUBS OR ORGANIZATIONS SUCH AS CHURCH GROUPS UNIONS, FRATERNAL OR ATHLETIC GROUPS, OR SCHOOL GROUPS?: NO
HOW OFTEN DO YOU GET TOGETHER WITH FRIENDS OR RELATIVES?: MORE THAN THREE TIMES A WEEK
HOW OFTEN DO YOU ATTENT MEETINGS OF THE CLUB OR ORGANIZATION YOU BELONG TO?: PATIENT DECLINED
ARE YOU MARRIED, WIDOWED, DIVORCED, SEPARATED, NEVER MARRIED, OR LIVING WITH A PARTNER?: PATIENT DECLINED
HOW OFTEN DO YOU GET TOGETHER WITH FRIENDS OR RELATIVES?: MORE THAN THREE TIMES A WEEK
WITHIN THE PAST 12 MONTHS, YOU WORRIED THAT YOUR FOOD WOULD RUN OUT BEFORE YOU GOT THE MONEY TO BUY MORE: PATIENT DECLINED
HOW OFTEN DO YOU HAVE SIX OR MORE DRINKS ON ONE OCCASION: LESS THAN MONTHLY

## 2023-08-31 ASSESSMENT — LIFESTYLE VARIABLES
HOW OFTEN DO YOU HAVE A DRINK CONTAINING ALCOHOL: MONTHLY OR LESS
HOW MANY STANDARD DRINKS CONTAINING ALCOHOL DO YOU HAVE ON A TYPICAL DAY: 1 OR 2
HOW OFTEN DO YOU HAVE SIX OR MORE DRINKS ON ONE OCCASION: LESS THAN MONTHLY
AUDIT-C TOTAL SCORE: 2
SKIP TO QUESTIONS 9-10: 0

## 2023-08-31 ASSESSMENT — ENCOUNTER SYMPTOMS: SHORTNESS OF BREATH: 0

## 2023-08-31 ASSESSMENT — FIBROSIS 4 INDEX: FIB4 SCORE: 1.17

## 2023-08-31 NOTE — PROGRESS NOTES
Subjective:     CC:    Chief Complaint   Patient presents with    Establish Care    Other     Referral to [facility] for physical therapy          HISTORY OF THE PRESENT ILLNESS: Patient is a 57 y.o. female. This pleasant patient is here today to establish care and discuss chronic conditions.  Prior PCP was Dr. Meza.    Problem   History of Breast Cancer    Infiltrating ductal carcinoma of right breast Found in 2021. She is s/p radiation and surgery. Could not tolerate anastrozole.   Continue diagnostic mammograms yearly.      Seasonal Allergies    Produces pressure and fluid behind the ears. Flonase working well.      Lymphedema of Right Arm   Complete Rotator Cuff Tear of Left Shoulder    In May she fell on left ribs and shoulder while gardening resulting in a complete rotator cuff tear. Recent shoulder surgery with Dr. Cabral. Doing well post surgery. She is upset about the communication with the office     PROCEDURE:  Left shoulder scope with rotator cuff repair, biceps tenodesis, subacromial decompression, and labral debridement     DATE OF SURGERY:  July 18, 2023     Subacromial Impingement of Left Shoulder   Chronic Gerd    Started taking after the sleeve.   Current regimen: omeprazole 20mg daily as needed only- a few days a week at most.      S/P Laparoscopic Sleeve Gastrectomy    Decided to pursue this after breast cancer diagnosis. she is status post sleeve gastrectomy 10/2022. She has lost 70lbs. She is very happy with her results and has resulted in decrease in BP, improvement in labs.  Post op lab results have been within normal limits.  She is taking athletic greens to supplement vitamins                        Current Outpatient Medications   Medication Sig    ondansetron (ZOFRAN ODT) 4 MG TABLET DISPERSIBLE Take 1 Tablet by mouth every 6 hours as needed for Nausea/Vomiting.    fluticasone (FLONASE) 50 MCG/ACT nasal spray Administer 1 Spray into affected nostril(S) every day.    NON SPECIFIED Take 1 Dose  by mouth per provider order. Athletic greens with 4oz of water    omeprazole (PRILOSEC) 20 MG delayed-release capsule Take 1 Capsule by mouth every day.        Social History     Socioeconomic History    Marital status: Single    Number of children: 3    Highest education level: Bachelor's degree (e.g., BA, AB, BS)   Occupational History    Occupation:      Employer: [employer]   Tobacco Use    Smoking status: Never    Smokeless tobacco: Never   Vaping Use    Vaping Use: Never used   Substance and Sexual Activity    Alcohol use: Yes     Alcohol/week: 1.2 oz     Types: 2 Glasses of wine per week     Comment: every other week    Drug use: No    Sexual activity: Yes     Partners: Male   Social History Narrative    Employed as .    In process of adopting 15 yo boy. Was in process of adopting his older sister but she  of complications of DM1 prior to completion.    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     Social Determinants of Health     Financial Resource Strain: Unknown (2023)    Overall Financial Resource Strain (CARDIA)     Difficulty of Paying Living Expenses: Patient refused   Food Insecurity: Unknown (2023)    Hunger Vital Sign     Worried About Running Out of Food in the Last Year: Patient refused     Ran Out of Food in the Last Year: Patient refused   Transportation Needs: Unknown (2023)    PRAPARE - Transportation     Lack of Transportation (Medical): Patient refused     Lack of Transportation (Non-Medical): Patient refused   Physical Activity: Unknown (2023)    Exercise Vital Sign     Days of Exercise per Week: 4 days     Minutes of Exercise per Session: Patient refused   Stress: Stress Concern Present (2023)    [place] [employer] - Occupational Stress Questionnaire     Feeling of Stress : To some extent   Social Connections: Unknown (2023)    Social Connection and Isolation Panel [NHANES]     Frequency of Communication with Friends and  "Family: More than three times a week     Frequency of Social Gatherings with Friends and Family: More than three times a week     Attends [employer] Services: More than 4 times per year     Active Member of Clubs or Organizations: No     Attends Club or Organization Meetings: Patient refused     Marital Status: Patient refused   Housing Stability: Unknown (8/31/2023)    Housing Stability Vital Sign     Unable to Pay for Housing in the Last Year: Patient refused     Unstable Housing in the Last Year: Patient refused       Family History   Problem Relation Age of Onset    Lung Disease Father         emphysema    Heart Disease Father         d. MI age 67    Hypertension Father     Lung Disease Mother         emphysema    Osteoporosis Mother     Diabetes Brother        ROS: See HPI  Review of Systems   Constitutional:  Negative for malaise/fatigue.   Respiratory:  Negative for shortness of breath.    Cardiovascular:  Negative for chest pain.         Objective:     Exam: /64 (BP Location: Right arm, Patient Position: Sitting, BP Cuff Size: Adult long)   Pulse 81   Temp 36.8 °C (98.2 °F) (Temporal)   Ht 1.753 m (5' 9\") Comment: patient reported  Wt 83.5 kg (184 lb)   LMP  (LMP Unknown) Comment: October 2020  SpO2 98%   BMI 27.17 kg/m²   Body mass index is 27.17 kg/m².    Physical Exam  Constitutional:       Appearance: Normal appearance.   Cardiovascular:      Rate and Rhythm: Normal rate and regular rhythm.      Pulses: Normal pulses.      Heart sounds: Normal heart sounds.   Pulmonary:      Effort: Pulmonary effort is normal.      Breath sounds: Normal breath sounds.   Musculoskeletal:      Cervical back: Normal range of motion and neck supple.   Neurological:      Mental Status: She is alert.                Assessment & Plan:     57 y.o. female with the following -     1. Chronic GERD  -Chronic, mild problem since gastric sleeve.   - Controlled with intermittent omeprazole use.    2. Traumatic complete tear " of left rotator cuff, subsequent encounter  -Doing well post surgery.  -Waiting for her referral to physical therapy.  Per our system the referral says incomplete.  Advised patient that the referrals department has 5-7 business days to complete the referral.  If she has not heard anything by Tuesday she should call the office.     3. S/P laparoscopic sleeve gastrectomy  -Doing very well post gastric sleeve with 70 pounds of weight loss.  -   - Resolution of high blood pressure and prediabetes.  -Continue healthy diet and regular exercise.    4. History of breast cancer  -Continue with yearly diagnostic mammograms    5. Seasonal allergies  -Chronic problem, controlled. continue use of Flonase.    6. Lymphedema of right arm  -Chronic problem that is currently in remission since her significant weight loss.  -Continue to avoid trauma to the right arm.   -Compression sleeve if edema occurs      HCC Gap Form    Last edited 08/31/23 10:21 PDT by RIOS Goodson.         Return in about 1 year (around 8/31/2024) for Annual preventative.    Please note that this dictation was created using voice recognition software. I have made every reasonable attempt to correct obvious errors, but I expect that there are errors of grammar and possibly content that I did not discover before finalizing the note.

## 2023-09-22 ENCOUNTER — DOCUMENTATION (OUTPATIENT)
Dept: HEALTH INFORMATION MANAGEMENT | Facility: OTHER | Age: 58
End: 2023-09-22
Payer: COMMERCIAL

## 2023-09-22 ENCOUNTER — TELEPHONE (OUTPATIENT)
Dept: HEALTH INFORMATION MANAGEMENT | Facility: OTHER | Age: 58
End: 2023-09-22
Payer: COMMERCIAL

## 2023-09-26 ENCOUNTER — OFFICE VISIT (OUTPATIENT)
Dept: DERMATOLOGY | Facility: IMAGING CENTER | Age: 58
End: 2023-09-26
Payer: COMMERCIAL

## 2023-09-26 DIAGNOSIS — D48.9 NEOPLASM OF UNCERTAIN BEHAVIOR: ICD-10-CM

## 2023-09-26 PROCEDURE — 11102 TANGNTL BX SKIN SINGLE LES: CPT | Performed by: NURSE PRACTITIONER

## 2023-09-26 NOTE — PROGRESS NOTES
"DERMATOLOGY NOTE  NEW VISIT       Chief complaint: Skin Lesion         HPI:  skin lesion    Location: left lower leg   Time present: 3 months   Painful lesion: No  Itching lesion: No  Enlarging lesion: No  Anything make it better or worse?no       History of skin cancer: No  History of precancers/actinic keratoses: No  History of biopsies:No  History of blistering/severe sunburns:Yes, Details: child , teenager   Family history of skin cancer:Yes, Details: father , type unknown   Family history of atypical moles:No      Allergies   Allergen Reactions    Sulfa Drugs Anaphylaxis     Other reaction(s): anaphylactic    Nsaids      Pt has hx of gastric sleeve    Oxycodone      \"Dont do well with oxycodone\"         MEDICATIONS:  Medications relevant to specialty reviewed.     REVIEW OF SYSTEMS:   Positive for skin (see HPI)  Negative for fevers and chills       EXAM:  LMP  (LMP Unknown) Comment: October 2020  Constitutional: Well-developed, well-nourished, and in no distress.     A focused skin exam was performed including the affected areas of the LLE. Notable findings on exam today listed below and/or in assessment/plan.     4 mm pink and brown fibrous papule to lateral aspect of LLE    IMPRESSION / PLAN:    1. Neoplasm of uncertain behavior  Procedure Note   Procedure: Biopsy by shave technique  Location: LLE  Size: as noted in exam  Preoperative diagnosis:DF vs other, r/o atypia  Risks, benefits and alternatives of procedure discussed, verbal consent obtained for photo (see chart) and written informed consent obtained for procedure. Time out completed. Area of biopsy prepped with alcohol. Anesthesia with 1% lidocaine with epinephrine administered with 30 gauge needle. Shave biopsy of the site performed. Hemostasis achieved with pressure and aluminum chloride. Vaseline applied to wound with bandage. Patient tolerated procedure well and there were no complications. The specimen was sent to the pathology lab by the staff. " Wound care was discussed.        Patient verbalized understanding and agrees with plan regarding the above        Please note that this dictation was created using voice recognition software. I have made every reasonable attempt to correct obvious errors, but I expect that there are errors of grammar and possibly content that I did not discover before finalizing the note.      Return to clinic in: Return for Pending Bx results. and as needed for any new or changing skin lesions.

## 2023-11-04 DIAGNOSIS — S22.42XD CLOSED FRACTURE OF MULTIPLE RIBS OF LEFT SIDE WITH ROUTINE HEALING: ICD-10-CM

## 2023-11-06 RX ORDER — CYCLOBENZAPRINE HCL 10 MG
TABLET ORAL
Qty: 30 TABLET | Refills: 0 | Status: SHIPPED
Start: 2023-11-06 | End: 2024-02-29

## 2024-02-29 ENCOUNTER — OFFICE VISIT (OUTPATIENT)
Dept: MEDICAL GROUP | Facility: MEDICAL CENTER | Age: 59
End: 2024-02-29
Payer: COMMERCIAL

## 2024-02-29 VITALS
HEART RATE: 81 BPM | OXYGEN SATURATION: 98 % | SYSTOLIC BLOOD PRESSURE: 122 MMHG | TEMPERATURE: 97.5 F | DIASTOLIC BLOOD PRESSURE: 70 MMHG | HEIGHT: 70 IN | WEIGHT: 191.83 LBS | BODY MASS INDEX: 27.46 KG/M2

## 2024-02-29 DIAGNOSIS — S16.1XXA STRAIN OF NECK MUSCLE, INITIAL ENCOUNTER: ICD-10-CM

## 2024-02-29 DIAGNOSIS — J30.9 ALLERGIC RHINITIS, UNSPECIFIED SEASONALITY, UNSPECIFIED TRIGGER: ICD-10-CM

## 2024-02-29 DIAGNOSIS — H65.191 ACUTE EFFUSION OF RIGHT EAR: ICD-10-CM

## 2024-02-29 PROCEDURE — 3078F DIAST BP <80 MM HG: CPT | Performed by: FAMILY MEDICINE

## 2024-02-29 PROCEDURE — 99214 OFFICE O/P EST MOD 30 MIN: CPT | Performed by: FAMILY MEDICINE

## 2024-02-29 PROCEDURE — 3074F SYST BP LT 130 MM HG: CPT | Performed by: FAMILY MEDICINE

## 2024-02-29 RX ORDER — METHYLPREDNISOLONE 4 MG/1
TABLET ORAL
Qty: 21 TABLET | Refills: 0 | Status: SHIPPED | OUTPATIENT
Start: 2024-02-29

## 2024-02-29 ASSESSMENT — FIBROSIS 4 INDEX: FIB4 SCORE: 1.189691463784683394

## 2024-02-29 NOTE — PROGRESS NOTES
Subjective:   CC: acute neck pain     HPI:     Laura Cano is a 58 y.o. female, established patient of the clinic.  Patient is new to me.  PCP is out of the office.    Patient was in her normal state of health until 5 days ago when she develops acute neck stiffness, pain, reduced range of motion.  Negative history of trauma to the head and neck.  Negative history of recent MVA.  No recent changes in physical activity.  Patient tried over-the-counter medication, but are not able to tolerate the pills due to its size.   Patient states that she had gastric sleeve surgery last year, which caused her to have problems with swallowing pills.  She has no problem with swallowing foods or drinks.   She also complains of bilateral ear fullness and congestion.  Denies fever, chills, rhinorrhea, sore throat, cough, shortness of breath, dyspnea nutrition.  Her throat is dry.  She has 1 bout of blood-tinged sputum yesterday.  However, since then she has no other problems.  Denies any history of chronic lung disease or unexplained weight loss  Denies fever, chills, nausea, vomiting, recent sick exposure.  She feels well otherwise.    Current medicines (including changes today)  Current Outpatient Medications   Medication Sig Dispense Refill    methylPREDNISolone (MEDROL DOSEPAK) 4 MG Tablet Therapy Pack 6 tabs day 1, 5 tabs day 2, 4 tabs day 3, 3 tabs day 4, 2 tabs day 5, 1 tab day 6. Take with food. 21 Tablet 0    fluticasone (FLONASE) 50 MCG/ACT nasal spray Administer 1 Spray into affected nostril(S) every day. 16 g 0    omeprazole (PRILOSEC) 20 MG delayed-release capsule Take 1 Capsule by mouth every day. 90 Capsule 1     No current facility-administered medications for this visit.     She  has a past medical history of Acne (04/16/2012), Arthritis (09/16/2022), Breast cancer (HCC) (2021), Cancer (HCC) (09/16/2022), Closed fracture of multiple ribs of left side with routine healing (5/8/2023), Dental disorder  "(09/16/2022), Dysuria (04/16/2012), Family history of colon cancer (04/16/2012), HTN (hypertension) (04/16/2012), HTN (hypertension) (4/16/2012), Hypertension, Infiltrating ductal carcinoma of right breast (HCC) (5/5/2021), Malignant neoplasm of upper-outer quadrant of right breast in female, estrogen receptor positive (HCC) (6/8/2021), Microcytic anemia (08/22/2012), Thrombosed external hemorrhoid (10/24/2012), and Vitamin d deficiency (04/16/2012).    I reviewed patient's problem list, allergies, medications, family hx, social hx with patient and update EPIC.        Objective:     /70 (BP Location: Left arm, Patient Position: Sitting, BP Cuff Size: Adult long)   Pulse 81   Temp 36.4 °C (97.5 °F) (Temporal)   Ht 1.778 m (5' 10\")   Wt 87 kg (191 lb 13.3 oz)   SpO2 98%  Body mass index is 27.53 kg/m².    Physical Exam:  Constitutional: awake, alert, in no distress.  Skin: Warm, dry, good turgor, no rashes, bruises, ulcers in visible areas.  Eye: conjunctiva clear, lids neg for edema or lesions.  ENMT: Moderate bilateral ear effusion.  Pale and congested nasal mucosa. Lips without lesions, good dentition, oropharynx clear, tonsils are surgically absent  Neck: Trachea midline, no masses, no thyromegaly. No cervical or supraclavicular lymphadenopathy  Respiratory: Unlabored respiratory effort, lungs clear to auscultation, no wheezes, no rales.  Cardiovascular: Normal S1, S2, no murmur, no pedal edema.  Psych: Oriented x3, affect and mood wnl, intact judgement and insight.   MSK: Reduced neck range of motion, negative cervical spine hematoma, negative rash.  Intact neuromuscular exam of upper extremity bilaterally.      Assessment and Plan:   The following treatment plan was discussed    1. Strain of neck muscle, initial encounter  History and exam are concerning for acute neck strain.  - Liquid Tylenol recommended as patient is not able to tolerate oral medication.  - Referral to Physical Therapy  - Heat, " stretching exercises, activity modification.  Follow-up as needed.    2. Acute effusion of right ear  - methylPREDNISolone (MEDROL DOSEPAK) 4 MG Tablet Therapy Pack; 6 tabs day 1, 5 tabs day 2, 4 tabs day 3, 3 tabs day 4, 2 tabs day 5, 1 tab day 6. Take with food.  Dispense: 21 Tablet; Refill: 0    3. Allergic rhinitis, unspecified seasonality, unspecified trigger  -Continue Flonase  -Over-the-counter antihistamine.    Jazmin Gauthier M.D.      Followup: Return for As needed.    Please note that this dictation was created using voice recognition software. I have made every reasonable attempt to correct obvious errors, but I expect that there are errors of grammar and possibly content that I did not discover before finalizing the note.

## 2024-03-13 ENCOUNTER — PATIENT MESSAGE (OUTPATIENT)
Dept: MEDICAL GROUP | Facility: MEDICAL CENTER | Age: 59
End: 2024-03-13
Payer: COMMERCIAL

## 2024-03-13 DIAGNOSIS — J32.9 CHRONIC CONGESTION OF PARANASAL SINUS: ICD-10-CM

## 2024-04-16 ENCOUNTER — OFFICE VISIT (OUTPATIENT)
Dept: MEDICAL GROUP | Facility: LAB | Age: 59
End: 2024-04-16
Payer: COMMERCIAL

## 2024-04-16 VITALS
WEIGHT: 200 LBS | DIASTOLIC BLOOD PRESSURE: 78 MMHG | OXYGEN SATURATION: 95 % | SYSTOLIC BLOOD PRESSURE: 144 MMHG | TEMPERATURE: 97.5 F | BODY MASS INDEX: 28.63 KG/M2 | RESPIRATION RATE: 16 BRPM | HEIGHT: 70 IN | HEART RATE: 84 BPM

## 2024-04-16 DIAGNOSIS — M25.521 RIGHT ELBOW PAIN: ICD-10-CM

## 2024-04-16 DIAGNOSIS — Z85.3 HISTORY OF BREAST CANCER: Chronic | ICD-10-CM

## 2024-04-16 DIAGNOSIS — Z98.890 S/P ARTHROSCOPY OF LEFT SHOULDER: ICD-10-CM

## 2024-04-16 DIAGNOSIS — J34.89 SINUS PRESSURE: ICD-10-CM

## 2024-04-16 DIAGNOSIS — Z00.00 PREVENTATIVE HEALTH CARE: ICD-10-CM

## 2024-04-16 DIAGNOSIS — Z98.84 S/P LAPAROSCOPIC SLEEVE GASTRECTOMY: ICD-10-CM

## 2024-04-16 DIAGNOSIS — I89.0 LYMPHEDEMA OF RIGHT ARM: ICD-10-CM

## 2024-04-16 DIAGNOSIS — J32.9 CHRONIC CONGESTION OF PARANASAL SINUS: ICD-10-CM

## 2024-04-16 PROBLEM — E78.49 OTHER HYPERLIPIDEMIA: Status: RESOLVED | Noted: 2022-12-28 | Resolved: 2024-04-16

## 2024-04-16 PROBLEM — M75.42 SUBACROMIAL IMPINGEMENT OF LEFT SHOULDER: Status: RESOLVED | Noted: 2023-05-26 | Resolved: 2024-04-16

## 2024-04-16 PROBLEM — G44.89 OTHER HEADACHE SYNDROME: Status: RESOLVED | Noted: 2021-03-02 | Resolved: 2024-04-16

## 2024-04-16 PROBLEM — M75.122 COMPLETE ROTATOR CUFF TEAR OF LEFT SHOULDER: Chronic | Status: RESOLVED | Noted: 2023-06-16 | Resolved: 2024-04-16

## 2024-04-16 PROBLEM — K21.9 CHRONIC GERD: Chronic | Status: RESOLVED | Noted: 2023-04-04 | Resolved: 2024-04-16

## 2024-04-16 PROBLEM — M19.90 ARTHRITIS: Status: RESOLVED | Noted: 2022-12-28 | Resolved: 2024-04-16

## 2024-04-16 PROCEDURE — 99215 OFFICE O/P EST HI 40 MIN: CPT | Performed by: NURSE PRACTITIONER

## 2024-04-16 PROCEDURE — 3078F DIAST BP <80 MM HG: CPT | Performed by: NURSE PRACTITIONER

## 2024-04-16 PROCEDURE — 3077F SYST BP >= 140 MM HG: CPT | Performed by: NURSE PRACTITIONER

## 2024-04-16 SDOH — HEALTH STABILITY: PHYSICAL HEALTH
ON AVERAGE, HOW MANY DAYS PER WEEK DO YOU ENGAGE IN MODERATE TO STRENUOUS EXERCISE (LIKE A BRISK WALK)?: PATIENT DECLINED

## 2024-04-16 SDOH — ECONOMIC STABILITY: HOUSING INSECURITY
IN THE LAST 12 MONTHS, WAS THERE A TIME WHEN YOU DID NOT HAVE A STEADY PLACE TO SLEEP OR SLEPT IN A SHELTER (INCLUDING NOW)?: PATIENT DECLINED

## 2024-04-16 SDOH — ECONOMIC STABILITY: FOOD INSECURITY: WITHIN THE PAST 12 MONTHS, THE FOOD YOU BOUGHT JUST DIDN'T LAST AND YOU DIDN'T HAVE MONEY TO GET MORE.: PATIENT DECLINED

## 2024-04-16 SDOH — ECONOMIC STABILITY: INCOME INSECURITY: IN THE LAST 12 MONTHS, WAS THERE A TIME WHEN YOU WERE NOT ABLE TO PAY THE MORTGAGE OR RENT ON TIME?: PATIENT DECLINED

## 2024-04-16 SDOH — HEALTH STABILITY: PHYSICAL HEALTH: ON AVERAGE, HOW MANY MINUTES DO YOU ENGAGE IN EXERCISE AT THIS LEVEL?: PATIENT DECLINED

## 2024-04-16 SDOH — ECONOMIC STABILITY: INCOME INSECURITY: HOW HARD IS IT FOR YOU TO PAY FOR THE VERY BASICS LIKE FOOD, HOUSING, MEDICAL CARE, AND HEATING?: PATIENT DECLINED

## 2024-04-16 SDOH — ECONOMIC STABILITY: FOOD INSECURITY: WITHIN THE PAST 12 MONTHS, YOU WORRIED THAT YOUR FOOD WOULD RUN OUT BEFORE YOU GOT MONEY TO BUY MORE.: PATIENT DECLINED

## 2024-04-16 SDOH — ECONOMIC STABILITY: HOUSING INSECURITY

## 2024-04-16 SDOH — HEALTH STABILITY: MENTAL HEALTH
STRESS IS WHEN SOMEONE FEELS TENSE, NERVOUS, ANXIOUS, OR CAN'T SLEEP AT NIGHT BECAUSE THEIR MIND IS TROUBLED. HOW STRESSED ARE YOU?: PATIENT DECLINED

## 2024-04-16 ASSESSMENT — PATIENT HEALTH QUESTIONNAIRE - PHQ9: CLINICAL INTERPRETATION OF PHQ2 SCORE: 0

## 2024-04-16 ASSESSMENT — SOCIAL DETERMINANTS OF HEALTH (SDOH)
HOW OFTEN DO YOU ATTEND CHURCH OR RELIGIOUS SERVICES?: PATIENT DECLINED
DO YOU BELONG TO ANY CLUBS OR ORGANIZATIONS SUCH AS CHURCH GROUPS UNIONS, FRATERNAL OR ATHLETIC GROUPS, OR SCHOOL GROUPS?: PATIENT DECLINED
DO YOU BELONG TO ANY CLUBS OR ORGANIZATIONS SUCH AS CHURCH GROUPS UNIONS, FRATERNAL OR ATHLETIC GROUPS, OR SCHOOL GROUPS?: PATIENT DECLINED
HOW OFTEN DO YOU GET TOGETHER WITH FRIENDS OR RELATIVES?: PATIENT DECLINED
HOW OFTEN DO YOU GET TOGETHER WITH FRIENDS OR RELATIVES?: PATIENT DECLINED
WITHIN THE PAST 12 MONTHS, YOU WORRIED THAT YOUR FOOD WOULD RUN OUT BEFORE YOU GOT THE MONEY TO BUY MORE: PATIENT DECLINED
HOW MANY DRINKS CONTAINING ALCOHOL DO YOU HAVE ON A TYPICAL DAY WHEN YOU ARE DRINKING: PATIENT DECLINED
IN A TYPICAL WEEK, HOW MANY TIMES DO YOU TALK ON THE PHONE WITH FAMILY, FRIENDS, OR NEIGHBORS?: PATIENT DECLINED
HOW OFTEN DO YOU ATTENT MEETINGS OF THE CLUB OR ORGANIZATION YOU BELONG TO?: PATIENT DECLINED
IN A TYPICAL WEEK, HOW MANY TIMES DO YOU TALK ON THE PHONE WITH FAMILY, FRIENDS, OR NEIGHBORS?: PATIENT DECLINED
ARE YOU MARRIED, WIDOWED, DIVORCED, SEPARATED, NEVER MARRIED, OR LIVING WITH A PARTNER?: PATIENT DECLINED
HOW OFTEN DO YOU HAVE SIX OR MORE DRINKS ON ONE OCCASION: PATIENT DECLINED
HOW OFTEN DO YOU HAVE A DRINK CONTAINING ALCOHOL: PATIENT DECLINED
HOW OFTEN DO YOU ATTENT MEETINGS OF THE CLUB OR ORGANIZATION YOU BELONG TO?: PATIENT DECLINED
ARE YOU MARRIED, WIDOWED, DIVORCED, SEPARATED, NEVER MARRIED, OR LIVING WITH A PARTNER?: PATIENT DECLINED
HOW HARD IS IT FOR YOU TO PAY FOR THE VERY BASICS LIKE FOOD, HOUSING, MEDICAL CARE, AND HEATING?: PATIENT DECLINED
HOW OFTEN DO YOU ATTEND CHURCH OR RELIGIOUS SERVICES?: PATIENT DECLINED

## 2024-04-16 ASSESSMENT — LIFESTYLE VARIABLES
HOW OFTEN DO YOU HAVE SIX OR MORE DRINKS ON ONE OCCASION: PATIENT DECLINED
HOW MANY STANDARD DRINKS CONTAINING ALCOHOL DO YOU HAVE ON A TYPICAL DAY: PATIENT DECLINED
HOW OFTEN DO YOU HAVE A DRINK CONTAINING ALCOHOL: PATIENT DECLINED
AUDIT-C TOTAL SCORE: -1
SKIP TO QUESTIONS 9-10: 0

## 2024-04-16 ASSESSMENT — FIBROSIS 4 INDEX: FIB4 SCORE: 1.189691463784683394

## 2024-04-16 NOTE — PROGRESS NOTES
Verbal consent was acquired by the patient to use [employer] ambient listening note generation during this visit Yes      Subjective   Laura Cano is a 58 y.o. female who presents for est care / discuss issues / hx / referrals.   History of Present Illness  Ilsa is a 59 yo est female transferring care closer to her home from previous [facility] pcp, Dr. Haji, who has left the [employer] [facility] for research.    Patient Active Problem List   Diagnosis    Family history of colon cancer - dad    Family history of osteoporosis - mom    S/P laparoscopic sleeve gastrectomy    History of breast cancer    Seasonal allergies    Lymphedema of right arm    S/P arthroscopy of left shoulder - 7/2023 - Dr Cabral - rotator cuff    Sinus pressure       Current Outpatient Medications:     fluticasone, 1 Spray, Nasal, DAILY, Taking    omeprazole, 20 mg, Oral, DAILY, PRN    Past Surgical History:   Procedure Laterality Date    PB SHLDR ARTHROSCOP,SURG,W/ROTAT CUFF REPB Left 7/18/2023    Procedure: Left shoulder scope with rotator cuff repair, biceps tenodesis, subacromial decompression, labral debridement;  Surgeon: Dora Cabral M.D.;  Location: SURGERY [facility];  Service: Orthopedics    CO LAP, PORTER RESTRICT PROC, LONGITUDINAL GAS*  10/5/2022    Procedure: LAPAROSCOPIC SLEEVE GASTRECTOMY;  Surgeon: Jeison Cano M.D.;  Location: SURGERY [facility];  Service: General    PB MASTECTOMY, PARTIAL Right 5/19/2021    Procedure: MASTECTOMY, PARTIAL - SHER PLACEMENT;  Surgeon: Danielle Coulter M.D.;  Location: SURGERY [facility] DAY [facility];  Service: General    NODE BIOPSY SENTINEL Right 5/19/2021    Procedure: BIOPSY, LYMPH NODE, SENTINEL - AXILLARY AFTER INJECTION;  Surgeon: Danielle Coulter M.D.;  Location: SURGERY SAME DAY [facility];  Service: General    LUMPECTOMY Right 05/19/2021    [facility]    CO RADIATION THERAPY PLAN SIMPLE Right 2021    TONSILLECTOMY      TUBAL LIGATION         Hx of breast CA:  2021.  Infiltrating ductal  "carcinoma of the right breast.  Status post radiation and surgery.  Dr. Coulter was her surgeon.  No longer follows up with surgery or oncology.  His cancer was HER+. She opted against anastrozole.  She is postmenopausal.  She is due for her yearly diagnostic mammogram.    She had a gastric sleeve done in 2022 which went very well.  She is due for her yearly lab work specific to having a gastric sleeve.    Chronic facial pressure: Present for about 4 years.  Requesting a referral to ENT.  The patient reports facial pressure and has not undergone a CT scan.  She has been using Flonase which she has not found helpful.  She has chronic sinus pressure and ear fullness.  She was told by her previous primary care physician that she may need ear tubes.    The patient has been experiencing right elbow pain for the past 6 months. She attributes this to a fall while carrying plants onto a cement surface. She was able to straighten and bend her elbow post-fall but reports pain on the inside that is not improving with time and she is ready to see orthopedics.    Healthcare maintenance: Today she declined the pneumonia vaccine.  Her Pap smears are managed by her gynecologist,  Yohana Garza and Dr. Tami Miller. Her Pap smears have been normal for several decades. She is not sexually active. Her last colonoscopy was in 2021, and she was advised to repeat it in 5 years.   She has never been a smoker. She drinks 2 drinks every other week. She does not use drugs. She is , and not currently dating. She has 5 kids, 2 of which are adopted age 39-23.  One of her children is in the  on an aircraft carrier in [place], [place] [employer]. She works as a .      Review of Systems  Negative except for HPI  Objective   BP (!) 144/78 (BP Location: Left arm, Patient Position: Sitting, BP Cuff Size: Large adult)   Pulse 84   Temp 36.4 °C (97.5 °F)   Resp 16   Ht 1.778 m (5' 10\")   Wt 90.7 kg (200 lb)  "  SpO2 95%   Physical Exam  Well developed, well nourished female in no apparent distress.  Eyes: Conjunctivae and sclerae are clear and non-icteric.  Neck: Supple  CV: Heart is regular without murmur, rub or gallop.  Pulm: Clear to auscultation.  Psychiatric: The patient is alert and oriented in all four spheres. Mood is euthymic. Affect is appropriate for the situation.  Skin: No rashes were noted.  Musculoskeletal: Gait is normal. Patient is able to transfer from sitting position to exam table without assistance.  Does have full range of motion of her right elbow without erythema or deformity but experiences tenderness to the medial aspect.      Assessment & Plan  1. Chronic congestion of paranasal sinus  Referral to ENT      2. S/P arthroscopy of left shoulder - 7/2023 - Dr Cabral - rotator cuff        3. History of breast cancer  MA-DIAGNOSTIC MAMMO BILAT W/TOMOSYNTHESIS W/CAD      4. Sinus pressure  Referral to ENT      5. Right elbow pain  Referral to Orthopedics      6. S/P laparoscopic sleeve gastrectomy  ZINC SERUM    FERRITIN    IRON/TOTAL IRON BIND    TRANSFERRIN    VITAMIN B2 (RIBOFLAVIN)    VITAMIN B6    VITAMIN B1    VITAMIN B12    FOLATE    PREALBUMIN    VITAMIN D,25 HYDROXY (DEFICIENCY)      7. Preventative health care  CBC WITH DIFFERENTIAL    Comp Metabolic Panel    Lipid Profile    TSH WITH REFLEX TO FT4        Referred to ENT regarding chronic sinus congestion not alleviated by antihistamines and Flonase.    Doing well in regards to her shoulder with Pilates.    Recommend diagnostic bilateral mammogram with her history of breast cancer.  She prefers to refrain from following up with oncology.  Denies any breast issues at this point.  Has a sleeve in case she has a flareup of right arm lymphedema.    Referred to orthopedics regarding 6 months of right elbow pain status post a fall.  She is able to do Pilates.  She still enjoys gardening.    All labs preferred by her gastric surgeon were ordered  in regards to her history of a gastric sleeve.  Added in CBC, CMP, lipid panel and TSH.    Colonoscopy and Pap smear are up-to-date.    Prefers to refrain from vaccines.    Follow-up at least yearly, sooner with any problems or concerns.    Total face to face time 40 minutes of which over 50% of this visit is spent in counseling, education and outlining a plan of treatment and coordination of care for the above conditions. This included but was not limited to discussion of medication options and potential risks related to the medications, referral and specialty care options. All patient questions were answered                 Please note that this dictation was created using voice recognition software. I have made every reasonable attempt to correct obvious errors, but I expect that there are errors of grammar and possibly content that I did not discover before finalizing the note.

## 2024-04-26 ENCOUNTER — HOSPITAL ENCOUNTER (OUTPATIENT)
Dept: RADIOLOGY | Facility: MEDICAL CENTER | Age: 59
End: 2024-04-26
Attending: NURSE PRACTITIONER
Payer: COMMERCIAL

## 2024-04-26 DIAGNOSIS — Z85.3 HISTORY OF BREAST CANCER: Chronic | ICD-10-CM

## 2024-04-26 PROCEDURE — G0279 TOMOSYNTHESIS, MAMMO: HCPCS

## 2024-05-29 ENCOUNTER — TELEPHONE (OUTPATIENT)
Dept: MEDICAL GROUP | Facility: LAB | Age: 59
End: 2024-05-29
Payer: COMMERCIAL

## 2024-05-29 NOTE — TELEPHONE ENCOUNTER
Patient called and states that she did go to ENT as you referred her to. She states that they want to do a procedure for her sinuses and that she was prone to have an ongoing sinus problem.  Her concern is since she had already been on multiple antibiotics due to having septic bursitis in her elbow. That maybe the problem with her sinuses is more fungal and not infection. She is inquiring of gemma kind of swab to test for this. I did let her know I would check with you about this, but that she would be better to follow up with ENT and ask if they would do this there to rule out before the procedure they want to do.   Please advise if this is something you could do or should she just go to ENT

## 2024-05-30 NOTE — TELEPHONE ENCOUNTER
Yes, you are correct.  If she is concerned about fungus, they can do the culture at ENT, not here as we do not have access to get up into her sinus cavities.

## 2024-06-21 ENCOUNTER — HOSPITAL ENCOUNTER (OUTPATIENT)
Facility: MEDICAL CENTER | Age: 59
End: 2024-06-21
Attending: OTOLARYNGOLOGY
Payer: COMMERCIAL

## 2024-06-21 PROCEDURE — 87205 SMEAR GRAM STAIN: CPT

## 2024-06-21 PROCEDURE — 87075 CULTR BACTERIA EXCEPT BLOOD: CPT

## 2024-06-21 PROCEDURE — 87070 CULTURE OTHR SPECIMN AEROBIC: CPT

## 2024-06-21 PROCEDURE — 87077 CULTURE AEROBIC IDENTIFY: CPT

## 2024-06-22 LAB
GRAM STN SPEC: NORMAL
SIGNIFICANT IND 70042: NORMAL
SITE SITE: NORMAL
SOURCE SOURCE: NORMAL

## 2024-06-25 LAB
BACTERIA WND AEROBE CULT: NORMAL
GRAM STN SPEC: NORMAL
SIGNIFICANT IND 70042: NORMAL
SITE SITE: NORMAL
SOURCE SOURCE: NORMAL

## 2024-06-26 LAB
BACTERIA SPEC ANAEROBE CULT: NORMAL
SIGNIFICANT IND 70042: NORMAL
SITE SITE: NORMAL
SOURCE SOURCE: NORMAL

## 2024-07-22 ENCOUNTER — HOSPITAL ENCOUNTER (EMERGENCY)
Facility: MEDICAL CENTER | Age: 59
End: 2024-07-22
Attending: EMERGENCY MEDICINE
Payer: COMMERCIAL

## 2024-07-22 VITALS
SYSTOLIC BLOOD PRESSURE: 128 MMHG | WEIGHT: 199.74 LBS | RESPIRATION RATE: 18 BRPM | HEART RATE: 90 BPM | BODY MASS INDEX: 29.58 KG/M2 | HEIGHT: 69 IN | DIASTOLIC BLOOD PRESSURE: 86 MMHG | TEMPERATURE: 97.2 F | OXYGEN SATURATION: 97 %

## 2024-07-22 DIAGNOSIS — R04.0 EPISTAXIS: ICD-10-CM

## 2024-07-22 PROCEDURE — 700101 HCHG RX REV CODE 250: Performed by: EMERGENCY MEDICINE

## 2024-07-22 PROCEDURE — 303620 HCHG EPISTAXIS CONTROL

## 2024-07-22 PROCEDURE — 99283 EMERGENCY DEPT VISIT LOW MDM: CPT

## 2024-07-22 PROCEDURE — A9270 NON-COVERED ITEM OR SERVICE: HCPCS | Performed by: EMERGENCY MEDICINE

## 2024-07-22 PROCEDURE — 700102 HCHG RX REV CODE 250 W/ 637 OVERRIDE(OP): Performed by: EMERGENCY MEDICINE

## 2024-07-22 RX ORDER — CEPHALEXIN 500 MG/1
500 CAPSULE ORAL 4 TIMES DAILY
Qty: 20 CAPSULE | Refills: 0 | Status: ACTIVE | OUTPATIENT
Start: 2024-07-22 | End: 2024-07-22

## 2024-07-22 RX ORDER — CEPHALEXIN 500 MG/1
500 CAPSULE ORAL 4 TIMES DAILY
Qty: 20 CAPSULE | Refills: 0 | Status: ACTIVE | OUTPATIENT
Start: 2024-07-22 | End: 2024-07-27

## 2024-07-22 RX ORDER — CEPHALEXIN 500 MG/1
500 CAPSULE ORAL ONCE
Status: COMPLETED | OUTPATIENT
Start: 2024-07-22 | End: 2024-07-22

## 2024-07-22 RX ORDER — ALPRAZOLAM 0.25 MG/1
0.25 TABLET ORAL NIGHTLY PRN
Status: DISCONTINUED | OUTPATIENT
Start: 2024-07-22 | End: 2024-07-22 | Stop reason: HOSPADM

## 2024-07-22 RX ORDER — ACETAMINOPHEN 160 MG/5ML
650 SUSPENSION ORAL ONCE
Status: COMPLETED | OUTPATIENT
Start: 2024-07-22 | End: 2024-07-22

## 2024-07-22 RX ORDER — TRANEXAMIC ACID 100 MG/ML
3 INJECTION, SOLUTION INTRAVENOUS ONCE
Status: COMPLETED | OUTPATIENT
Start: 2024-07-22 | End: 2024-07-22

## 2024-07-22 RX ADMIN — TRANEXAMIC ACID 300 MG: 100 INJECTION, SOLUTION INTRAVENOUS at 20:30

## 2024-07-22 RX ADMIN — CEPHALEXIN 500 MG: 500 CAPSULE ORAL at 20:38

## 2024-07-22 RX ADMIN — ACETAMINOPHEN 640 MG: 160 SUSPENSION ORAL at 20:36

## 2024-07-22 RX ADMIN — ALPRAZOLAM 0.25 MG: 0.25 TABLET ORAL at 20:51

## 2024-07-22 ASSESSMENT — FIBROSIS 4 INDEX: FIB4 SCORE: 1.189691463784683394

## 2024-08-21 NOTE — ANESTHESIA PROCEDURE NOTES
Peripheral Block    Date/Time: 7/18/2023 6:43 AM    Performed by: Hans Mckenna D.O.  Authorized by: Hans Mckenna D.O.    Patient Location:  Pre-op  Start Time:  7/18/2023 6:43 AM  End Time:  7/18/2023 6:47 AM  Reason for Block: at surgeon's request and post-op pain management ONLY    patient identified, IV checked, site marked, risks and benefits discussed, surgical consent, monitors and equipment checked, pre-op evaluation and timeout performed    Patient Position:  Supine  Prep: ChloraPrep    Monitoring:  Heart rate, continuous pulse ox and cardiac monitor  Block Region:  Upper Extremity  Upper Extremity - Block Type:  BRACHIAL PLEXUS block, Interscalene approach    Laterality:  Left  Procedures: ultrasound guided  Image captured, interpreted and electronically stored.  Local Infiltration:  Lidocaine  Strength:  1 %  Dose:  3 ml  Block Type:  Single-shot  Needle Length:  100mm  Needle Gauge:  21 G  Needle Localization:  Ultrasound guidance  Injection Assessment:  Negative aspiration for heme, no paresthesia on injection, incremental injection and local visualized surrounding nerve on ultrasound  Evidence of intravascular injection: No         Tests Considered But Not Performed CBC, coags--> no concern for bleeding diathesis

## 2025-01-08 ENCOUNTER — APPOINTMENT (OUTPATIENT)
Dept: MEDICAL GROUP | Facility: LAB | Age: 60
End: 2025-01-08
Payer: COMMERCIAL

## 2025-01-10 ENCOUNTER — HOSPITAL ENCOUNTER (OUTPATIENT)
Dept: RADIOLOGY | Facility: MEDICAL CENTER | Age: 60
End: 2025-01-10
Attending: PHYSICIAN ASSISTANT
Payer: COMMERCIAL

## 2025-01-10 ENCOUNTER — HOSPITAL ENCOUNTER (OUTPATIENT)
Dept: LAB | Facility: MEDICAL CENTER | Age: 60
End: 2025-01-10
Attending: PHYSICIAN ASSISTANT
Payer: COMMERCIAL

## 2025-01-10 ENCOUNTER — OFFICE VISIT (OUTPATIENT)
Dept: MEDICAL GROUP | Facility: LAB | Age: 60
End: 2025-01-10
Payer: COMMERCIAL

## 2025-01-10 VITALS
RESPIRATION RATE: 16 BRPM | TEMPERATURE: 98.1 F | SYSTOLIC BLOOD PRESSURE: 160 MMHG | BODY MASS INDEX: 30.24 KG/M2 | WEIGHT: 204.2 LBS | HEIGHT: 69 IN | HEART RATE: 77 BPM | DIASTOLIC BLOOD PRESSURE: 90 MMHG | OXYGEN SATURATION: 97 %

## 2025-01-10 DIAGNOSIS — M79.671 RIGHT FOOT PAIN: ICD-10-CM

## 2025-01-10 LAB
BASOPHILS # BLD AUTO: 1.2 % (ref 0–1.8)
BASOPHILS # BLD: 0.08 K/UL (ref 0–0.12)
EOSINOPHIL # BLD AUTO: 0.3 K/UL (ref 0–0.51)
EOSINOPHIL NFR BLD: 4.5 % (ref 0–6.9)
ERYTHROCYTE [DISTWIDTH] IN BLOOD BY AUTOMATED COUNT: 45.7 FL (ref 35.9–50)
HCT VFR BLD AUTO: 43.4 % (ref 37–47)
HGB BLD-MCNC: 14.3 G/DL (ref 12–16)
IMM GRANULOCYTES # BLD AUTO: 0.01 K/UL (ref 0–0.11)
IMM GRANULOCYTES NFR BLD AUTO: 0.1 % (ref 0–0.9)
LYMPHOCYTES # BLD AUTO: 2.21 K/UL (ref 1–4.8)
LYMPHOCYTES NFR BLD: 33 % (ref 22–41)
MCH RBC QN AUTO: 29.8 PG (ref 27–33)
MCHC RBC AUTO-ENTMCNC: 32.9 G/DL (ref 32.2–35.5)
MCV RBC AUTO: 90.4 FL (ref 81.4–97.8)
MONOCYTES # BLD AUTO: 0.58 K/UL (ref 0–0.85)
MONOCYTES NFR BLD AUTO: 8.7 % (ref 0–13.4)
NEUTROPHILS # BLD AUTO: 3.51 K/UL (ref 1.82–7.42)
NEUTROPHILS NFR BLD: 52.5 % (ref 44–72)
NRBC # BLD AUTO: 0 K/UL
NRBC BLD-RTO: 0 /100 WBC (ref 0–0.2)
PLATELET # BLD AUTO: 195 K/UL (ref 164–446)
PMV BLD AUTO: 9.5 FL (ref 9–12.9)
RBC # BLD AUTO: 4.8 M/UL (ref 4.2–5.4)
WBC # BLD AUTO: 6.7 K/UL (ref 4.8–10.8)

## 2025-01-10 PROCEDURE — 93971 EXTREMITY STUDY: CPT | Mod: RT

## 2025-01-10 PROCEDURE — 85025 COMPLETE CBC W/AUTO DIFF WBC: CPT

## 2025-01-10 PROCEDURE — 3080F DIAST BP >= 90 MM HG: CPT | Performed by: PHYSICIAN ASSISTANT

## 2025-01-10 PROCEDURE — 36415 COLL VENOUS BLD VENIPUNCTURE: CPT

## 2025-01-10 PROCEDURE — 3077F SYST BP >= 140 MM HG: CPT | Performed by: PHYSICIAN ASSISTANT

## 2025-01-10 PROCEDURE — 99214 OFFICE O/P EST MOD 30 MIN: CPT | Performed by: PHYSICIAN ASSISTANT

## 2025-01-10 ASSESSMENT — FIBROSIS 4 INDEX: FIB4 SCORE: 1.21

## 2025-01-10 NOTE — PROGRESS NOTES
Subjective:     CC: Foot pain    HPI:   Laura here today with   Verbal consent was acquired by the patient to use [employer] ambient listening note generation during this visit Yes     History of Present Illness  The patient presents for evaluation of right foot pain.       She also reports swelling, redness, and inflammation on the top of her right foot, which appeared approximately 4 days ago. She reports no trauma or falls. She is concerned about a potential infection on the right side of her body, as her hip appears normal. She expresses concern about the possibility of metastasis, given her history of cancer. She reports discoloration of her foot in the evening and protrusion of small veins. She initially suspected a spider bite but ruled this out due to the absence of a wound. She reports no pain in the back of her calf. She has not traveled recently and has been active around her house. She reports no history of blood clots and is not on any blood thinner medication. She is a  and was particularly active yesterday, after which she noticed significant inflammation and redness in her foot.  She has a history of adenocarcinoma invasive ductal cancer, diagnosed a few years ago, for which she underwent a partial mastectomy, tumor excision, and removal of sentinel, axillary, and a third of the lymph nodes under her right arm. She also received radiation therapy. She experienced a fall resulting in a left shoulder injury and rotator cuff tear, necessitating biceps tendon replacement 08/2023        ROS:  All ROS negative except for pertinent positives listed above.       Current Outpatient Medications Ordered in Epic   Medication Sig Dispense Refill    fluticasone (FLONASE) 50 MCG/ACT nasal spray Administer 1 Spray into affected nostril(S) every day. 16 g 0    omeprazole (PRILOSEC) 20 MG delayed-release capsule Take 1 Capsule by mouth every day. 90 Capsule 1     No current Epic-ordered  "facility-administered medications on file.       Objective:     Exam:  BP (!) 160/90 (BP Location: Left arm, Patient Position: Sitting, BP Cuff Size: Adult)   Pulse 77   Temp 36.7 °C (98.1 °F) (Temporal)   Resp 16   Ht 1.753 m (5' 9\")   Wt 92.6 kg (204 lb 3.2 oz)   LMP  (LMP Unknown) Comment: October 2020  SpO2 97%   BMI 30.16 kg/m²  Body mass index is 30.16 kg/m².    Gen: Alert and oriented, No apparent distress.  Neck: Neck is supple without lymphadenopathy.  Lungs: Normal effort, CTA bilaterally, no wheezes, rhonchi, or rales  CV: Regular rate and rhythm. No murmurs, rubs, or gallops.  Ext: No clubbing, cyanosis, edema.  Physical Exam  Musculoskeletal:        Feet:    Feet:      Comments: Focal area, slightly swollen and warm to the touch.  No fluctuance or edema noted.  Nontender to palpation    .  Homans' sign negative        Assessment & Plan:     59 y.o. female with the following -     Assessment & Plan  1. Right foot pain.  The differential diagnosis includes localized osteoarthritis, cellulitis or potential blood clot.  Patient does have a past medical history of cancer but no other risk factors for blood clot. An ultrasound of the right foot will be ordered to rule out a blood clot. A complete blood count (CBC) will also be conducted . A prescription for antibiotics will be sent to [facility] which can be initiated if the ultrasound results are negative for a blood clot.    PROCEDURE  The patient underwent a partial mastectomy, tumor excision, and removal of sentinel, axillary, and a third of the lymph nodes under her right arm. She also received radiation therapy. Additionally, she had a biceps tendon replacement following a left shoulder injury and rotator cuff tear.        I spent a total of 22 minutes with record review, exam, communication with the patient, communication with other providers, and documentation of this encounter.      No follow-ups on file.    Please note that this dictation was " created using voice recognition software. I have made every reasonable attempt to correct obvious errors, but there may be errors of grammar and possibly content that I did not discover before finalizing the note.

## 2025-01-13 ENCOUNTER — PATIENT MESSAGE (OUTPATIENT)
Dept: MEDICAL GROUP | Facility: LAB | Age: 60
End: 2025-01-13
Payer: COMMERCIAL

## 2025-01-16 ENCOUNTER — PATIENT MESSAGE (OUTPATIENT)
Dept: MEDICAL GROUP | Facility: LAB | Age: 60
End: 2025-01-16
Payer: COMMERCIAL

## 2025-01-16 DIAGNOSIS — M25.551 RIGHT HIP PAIN: ICD-10-CM

## 2025-02-07 ENCOUNTER — HOSPITAL ENCOUNTER (OUTPATIENT)
Dept: RADIOLOGY | Facility: MEDICAL CENTER | Age: 60
End: 2025-02-07
Attending: NURSE PRACTITIONER
Payer: COMMERCIAL

## 2025-02-07 DIAGNOSIS — M25.551 RIGHT HIP PAIN: ICD-10-CM

## 2025-02-07 PROCEDURE — 700117 HCHG RX CONTRAST REV CODE 255: Performed by: NURSE PRACTITIONER

## 2025-02-07 PROCEDURE — 73722 MRI JOINT OF LWR EXTR W/DYE: CPT | Mod: RT

## 2025-02-07 PROCEDURE — A9579 GAD-BASE MR CONTRAST NOS,1ML: HCPCS | Mod: JZ | Performed by: NURSE PRACTITIONER

## 2025-02-07 PROCEDURE — 27093 INJECTION FOR HIP X-RAY: CPT | Mod: RT

## 2025-02-07 RX ADMIN — IOHEXOL 10 ML: 300 INJECTION, SOLUTION INTRAVENOUS at 13:45

## 2025-02-07 RX ADMIN — GADOTERIDOL 0.1 ML: 279.3 INJECTION, SOLUTION INTRAVENOUS at 13:45

## 2025-02-09 ENCOUNTER — PATIENT MESSAGE (OUTPATIENT)
Dept: MEDICAL GROUP | Facility: LAB | Age: 60
End: 2025-02-09
Payer: COMMERCIAL

## 2025-02-09 DIAGNOSIS — M25.551 RIGHT HIP PAIN: ICD-10-CM

## 2025-02-09 DIAGNOSIS — M79.671 RIGHT FOOT PAIN: ICD-10-CM

## 2025-02-13 NOTE — Clinical Note
REFERRAL APPROVAL NOTICE         Sent on February 13, 2025                   Laura Cano  [address]  [place] [address]                   Dear Ms. Cano,    After a careful review of the medical information and benefit coverage, [facility] has processed your referral. See below for additional details.    If applicable, you must be actively enrolled with your insurance for coverage of the authorized service. If you have any questions regarding your coverage, please contact your insurance directly.    REFERRAL INFORMATION   Referral #:  [id number]  Referred-To Provider    Referred-By Provider:  Orthopedic Surgery    ANGELLA Spivey   [facility]      [address]  [place] [address]  [phone] [phone]  [place] [address]  [phone]    Referral Start Date:  02/11/2025  Referral End Date:   02/11/2026             SCHEDULING  If you do not already have an appointment, please call [phone] to make an appointment.     MORE INFORMATION  If you do not already have a [employer] account, sign up at: [email or web address]  You can access your medical information, make appointments, see lab results, billing information, and more.  If you have questions regarding this referral, please contact  the [facility] Referrals department at:             [phone]. Monday - Friday 8:00AM - 5:00PM.     Sincerely,    [facility]

## 2025-02-18 ENCOUNTER — APPOINTMENT (OUTPATIENT)
Dept: RADIOLOGY | Facility: MEDICAL CENTER | Age: 60
End: 2025-02-18
Attending: PHYSICIAN ASSISTANT
Payer: COMMERCIAL

## 2025-02-18 ENCOUNTER — OFFICE VISIT (OUTPATIENT)
Dept: MEDICAL GROUP | Facility: LAB | Age: 60
End: 2025-02-18
Payer: COMMERCIAL

## 2025-02-18 VITALS
BODY MASS INDEX: 30.79 KG/M2 | HEIGHT: 69 IN | OXYGEN SATURATION: 96 % | DIASTOLIC BLOOD PRESSURE: 78 MMHG | WEIGHT: 207.89 LBS | RESPIRATION RATE: 16 BRPM | HEART RATE: 80 BPM | SYSTOLIC BLOOD PRESSURE: 150 MMHG | TEMPERATURE: 97.3 F

## 2025-02-18 DIAGNOSIS — G89.29 CHRONIC RIGHT SHOULDER PAIN: ICD-10-CM

## 2025-02-18 DIAGNOSIS — M25.511 CHRONIC RIGHT SHOULDER PAIN: ICD-10-CM

## 2025-02-18 DIAGNOSIS — M25.551 RIGHT HIP PAIN: ICD-10-CM

## 2025-02-18 PROCEDURE — 3077F SYST BP >= 140 MM HG: CPT | Performed by: PHYSICIAN ASSISTANT

## 2025-02-18 PROCEDURE — 99214 OFFICE O/P EST MOD 30 MIN: CPT | Performed by: PHYSICIAN ASSISTANT

## 2025-02-18 PROCEDURE — 73030 X-RAY EXAM OF SHOULDER: CPT | Mod: RT

## 2025-02-18 PROCEDURE — 3078F DIAST BP <80 MM HG: CPT | Performed by: PHYSICIAN ASSISTANT

## 2025-02-18 RX ORDER — CYCLOBENZAPRINE HCL 5 MG
5 TABLET ORAL 3 TIMES DAILY PRN
Qty: 21 TABLET | Refills: 0 | Status: SHIPPED | OUTPATIENT
Start: 2025-02-18 | End: 2025-02-25

## 2025-02-18 ASSESSMENT — FIBROSIS 4 INDEX: FIB4 SCORE: 1.43

## 2025-02-18 NOTE — PROGRESS NOTES
Subjective:     CC: Arm pain    HPI:   Laura here today with   Verbal consent was acquired by the patient to use [employer] ambient listening note generation during this visit Yes     History of Present Illness  The patient is a 59-year-old female who presents for evaluation of right shoulder pain, right hip pain, and sinus infection.    # Right shoulder pain  She reports experiencing right shoulder pain, which she describes as similar to the sensation felt during a previous rotator cuff tear. The onset of this pain was approximately 4 days ago, with no associated trauma or injury. She also notes the presence of lymphedema on the right side of her body. She has a history of breast cancer on the right side and lymphedema.    She has been managing the lymphedema with a gauntlet and sleeve. She reports no recent trauma or falls and has not been carrying heavy objects. She reports no pain in the elbow joint but describes the pain as radiating from the shoulder. She is unable to reach behind her back or unhook her bra due to the pain.     She has not had any imaging of the right shoulder.The pain has been progressively worsening. She has previously been prescribed muscle relaxers for a rib fracture. She has a remote history of sepsis in her elbow.   MEDICATIONS  Current: amoxicillin, hydroquinone    DX-SHOULDER 2+ RIGHT  Narrative: 2/18/2025 1:37 PM    HISTORY/REASON FOR EXAM:  Atraumatic Pain/Swelling/Deformity.    TECHNIQUE/EXAM DESCRIPTION AND NUMBER OF VIEWS:  3 views of the RIGHT shoulder.    COMPARISON: None    FINDINGS:  There is no fracture or dislocation.  The visualized osseous structures are in anatomic alignment.  At least moderate acromioclavicular degenerative changes. Faint calcific density adjacent to the humeral head could be mild calcific tendinitis of the rotator cuff.  Bone mineralization is age-appropriate..  Impression: At least moderate acromioclavicular degenerative changes.    Questionable  "faint calcific density adjacent to the humeral head could be mild calcific tendinitis of the rotator cuff.          ROS:  All ROS negative except for pertinent positives listed above.       Current Outpatient Medications Ordered in Epic   Medication Sig Dispense Refill    cyclobenzaprine (FLEXERIL) 5 mg tablet Take 1 Tablet by mouth 3 times a day as needed for Moderate Pain for up to 7 days. 21 Tablet 0    fluticasone (FLONASE) 50 MCG/ACT nasal spray Administer 1 Spray into affected nostril(S) every day. 16 g 0    omeprazole (PRILOSEC) 20 MG delayed-release capsule Take 1 Capsule by mouth every day. 90 Capsule 1     No current Epic-ordered facility-administered medications on file.         Objective:     Exam:  BP (!) 150/78 (BP Location: Left arm, Patient Position: Sitting, BP Cuff Size: Adult)   Pulse 80   Temp 36.3 °C (97.3 °F) (Temporal)   Resp 16   Ht 1.753 m (5' 9\")   Wt 94.3 kg (207 lb 14.3 oz)   LMP  (LMP Unknown) Comment: October 2020  SpO2 96%   BMI 30.70 kg/m²  Body mass index is 30.7 kg/m².    Gen: Alert and oriented, No apparent distress.  Neck: Neck is supple without lymphadenopathy.  Lungs: Normal effort, CTA bilaterally, no wheezes, rhonchi, or rales  CV: Regular rate and rhythm. No murmurs, rubs, or gallops.  Ext: No clubbing, cyanosis, edema.  Right shoulder: Tender to palpation over posterior aspect of the deltoid.  Pain with extension/flexion active and passive greater than 90 degrees full strength, empty can test positive      Assessment & Plan:     59 y.o. female with the following -   Assessment & Plan  1. Right shoulder pain.   There is concern for a potential rotator cuff structural abnormality. An x-ray of the right shoulder will be ordered to evaluate for any structural abnormalities. A prescription for a muscle relaxer will be sent to [email or web address] to help alleviate the pain. If the x-ray shows any abnormalities, further imaging such as a PET scan or MRI may be " considered.            Problem List Items Addressed This Visit    None  Visit Diagnoses         Chronic right shoulder pain        Relevant Medications    cyclobenzaprine (FLEXERIL) 5 mg tablet    Other Relevant Orders    DX-SHOULDER 2+ RIGHT (Completed)    Referral to Physical Therapy      Right hip pain        Relevant Medications    cyclobenzaprine (FLEXERIL) 5 mg tablet    Other Relevant Orders    Referral to Physical Therapy            I spent a total of 16 minutes with record review, exam, communication with the patient, communication with other providers, and documentation of this encounter.      No follow-ups on file.    Please note that this dictation was created using voice recognition software. I have made every reasonable attempt to correct obvious errors, but there may be errors of grammar and possibly content that I did not discover before finalizing the note.

## 2025-02-19 ENCOUNTER — RESULTS FOLLOW-UP (OUTPATIENT)
Dept: MEDICAL GROUP | Facility: LAB | Age: 60
End: 2025-02-19
Payer: COMMERCIAL

## 2025-03-06 ENCOUNTER — PATIENT MESSAGE (OUTPATIENT)
Dept: MEDICAL GROUP | Facility: LAB | Age: 60
End: 2025-03-06
Payer: COMMERCIAL

## 2025-03-06 DIAGNOSIS — N95.1 MENOPAUSAL SYNDROME: ICD-10-CM

## 2025-03-12 ENCOUNTER — HOSPITAL ENCOUNTER (OUTPATIENT)
Dept: LAB | Facility: MEDICAL CENTER | Age: 60
End: 2025-03-12
Attending: NURSE PRACTITIONER
Payer: COMMERCIAL

## 2025-03-12 DIAGNOSIS — N95.1 MENOPAUSAL SYNDROME: ICD-10-CM

## 2025-03-12 DIAGNOSIS — Z00.00 PREVENTATIVE HEALTH CARE: ICD-10-CM

## 2025-03-12 DIAGNOSIS — Z98.84 S/P LAPAROSCOPIC SLEEVE GASTRECTOMY: ICD-10-CM

## 2025-03-12 LAB
BASOPHILS # BLD AUTO: 1.2 % (ref 0–1.8)
BASOPHILS # BLD: 0.06 K/UL (ref 0–0.12)
EOSINOPHIL # BLD AUTO: 0.2 K/UL (ref 0–0.51)
EOSINOPHIL NFR BLD: 4.1 % (ref 0–6.9)
ERYTHROCYTE [DISTWIDTH] IN BLOOD BY AUTOMATED COUNT: 41.1 FL (ref 35.9–50)
FOLATE SERPL-MCNC: 18.7 NG/ML
HCT VFR BLD AUTO: 47 % (ref 37–47)
HGB BLD-MCNC: 15.1 G/DL (ref 12–16)
IMM GRANULOCYTES # BLD AUTO: 0.01 K/UL (ref 0–0.11)
IMM GRANULOCYTES NFR BLD AUTO: 0.2 % (ref 0–0.9)
LYMPHOCYTES # BLD AUTO: 1.37 K/UL (ref 1–4.8)
LYMPHOCYTES NFR BLD: 28.1 % (ref 22–41)
MCH RBC QN AUTO: 29.4 PG (ref 27–33)
MCHC RBC AUTO-ENTMCNC: 32.1 G/DL (ref 32.2–35.5)
MCV RBC AUTO: 91.4 FL (ref 81.4–97.8)
MONOCYTES # BLD AUTO: 0.37 K/UL (ref 0–0.85)
MONOCYTES NFR BLD AUTO: 7.6 % (ref 0–13.4)
NEUTROPHILS # BLD AUTO: 2.86 K/UL (ref 1.82–7.42)
NEUTROPHILS NFR BLD: 58.8 % (ref 44–72)
NRBC # BLD AUTO: 0 K/UL
NRBC BLD-RTO: 0 /100 WBC (ref 0–0.2)
PLATELET # BLD AUTO: 246 K/UL (ref 164–446)
PMV BLD AUTO: 9.7 FL (ref 9–12.9)
PREALB SERPL-MCNC: 28.2 MG/DL (ref 18–38)
RBC # BLD AUTO: 5.14 M/UL (ref 4.2–5.4)
WBC # BLD AUTO: 4.9 K/UL (ref 4.8–10.8)

## 2025-03-12 PROCEDURE — 82306 VITAMIN D 25 HYDROXY: CPT

## 2025-03-12 PROCEDURE — 84425 ASSAY OF VITAMIN B-1: CPT

## 2025-03-12 PROCEDURE — 80061 LIPID PANEL: CPT

## 2025-03-12 PROCEDURE — 82670 ASSAY OF TOTAL ESTRADIOL: CPT

## 2025-03-12 PROCEDURE — 80053 COMPREHEN METABOLIC PANEL: CPT

## 2025-03-12 PROCEDURE — 84134 ASSAY OF PREALBUMIN: CPT

## 2025-03-12 PROCEDURE — 83550 IRON BINDING TEST: CPT

## 2025-03-12 PROCEDURE — 84144 ASSAY OF PROGESTERONE: CPT

## 2025-03-12 PROCEDURE — 84402 ASSAY OF FREE TESTOSTERONE: CPT

## 2025-03-12 PROCEDURE — 84630 ASSAY OF ZINC: CPT

## 2025-03-12 PROCEDURE — 84270 ASSAY OF SEX HORMONE GLOBUL: CPT

## 2025-03-12 PROCEDURE — 82728 ASSAY OF FERRITIN: CPT

## 2025-03-12 PROCEDURE — 84443 ASSAY THYROID STIM HORMONE: CPT

## 2025-03-12 PROCEDURE — 36415 COLL VENOUS BLD VENIPUNCTURE: CPT

## 2025-03-12 PROCEDURE — 83540 ASSAY OF IRON: CPT

## 2025-03-12 PROCEDURE — 82607 VITAMIN B-12: CPT

## 2025-03-12 PROCEDURE — 83001 ASSAY OF GONADOTROPIN (FSH): CPT

## 2025-03-12 PROCEDURE — 85025 COMPLETE CBC W/AUTO DIFF WBC: CPT

## 2025-03-12 PROCEDURE — 82746 ASSAY OF FOLIC ACID SERUM: CPT

## 2025-03-12 PROCEDURE — 84252 ASSAY OF VITAMIN B-2: CPT

## 2025-03-12 PROCEDURE — 84403 ASSAY OF TOTAL TESTOSTERONE: CPT

## 2025-03-12 PROCEDURE — 84466 ASSAY OF TRANSFERRIN: CPT

## 2025-03-12 PROCEDURE — 84207 ASSAY OF VITAMIN B-6: CPT

## 2025-03-13 ENCOUNTER — RESULTS FOLLOW-UP (OUTPATIENT)
Dept: MEDICAL GROUP | Facility: LAB | Age: 60
End: 2025-03-13

## 2025-03-13 LAB
25(OH)D3 SERPL-MCNC: 28 NG/ML (ref 30–100)
ALBUMIN SERPL BCP-MCNC: 4.4 G/DL (ref 3.2–4.9)
ALBUMIN/GLOB SERPL: 1.5 G/DL
ALP SERPL-CCNC: 65 U/L (ref 30–99)
ALT SERPL-CCNC: 20 U/L (ref 2–50)
ANION GAP SERPL CALC-SCNC: 13 MMOL/L (ref 7–16)
AST SERPL-CCNC: 29 U/L (ref 12–45)
BILIRUB SERPL-MCNC: 0.5 MG/DL (ref 0.1–1.5)
BUN SERPL-MCNC: 14 MG/DL (ref 8–22)
CALCIUM ALBUM COR SERPL-MCNC: 9.4 MG/DL (ref 8.5–10.5)
CALCIUM SERPL-MCNC: 9.7 MG/DL (ref 8.5–10.5)
CHLORIDE SERPL-SCNC: 103 MMOL/L (ref 96–112)
CHOLEST SERPL-MCNC: 234 MG/DL (ref 100–199)
CO2 SERPL-SCNC: 23 MMOL/L (ref 20–33)
CREAT SERPL-MCNC: 0.68 MG/DL (ref 0.5–1.4)
ESTRADIOL SERPL-MCNC: <5 PG/ML
FERRITIN SERPL-MCNC: 84.5 NG/ML (ref 10–291)
FSH SERPL-ACNC: 50.7 MIU/ML
GFR SERPLBLD CREATININE-BSD FMLA CKD-EPI: 101 ML/MIN/1.73 M 2
GLOBULIN SER CALC-MCNC: 2.9 G/DL (ref 1.9–3.5)
GLUCOSE SERPL-MCNC: 81 MG/DL (ref 65–99)
HDLC SERPL-MCNC: 89 MG/DL
IRON SATN MFR SERPL: 41 % (ref 15–55)
IRON SERPL-MCNC: 142 UG/DL (ref 40–170)
LDLC SERPL CALC-MCNC: 122 MG/DL
POTASSIUM SERPL-SCNC: 4 MMOL/L (ref 3.6–5.5)
PROGEST SERPL-MCNC: <0.1 NG/ML
PROT SERPL-MCNC: 7.3 G/DL (ref 6–8.2)
SODIUM SERPL-SCNC: 139 MMOL/L (ref 135–145)
TIBC SERPL-MCNC: 344 UG/DL (ref 250–450)
TRANSFERRIN SERPL-MCNC: 287 MG/DL (ref 200–370)
TRIGL SERPL-MCNC: 113 MG/DL (ref 0–149)
TSH SERPL DL<=0.005 MIU/L-ACNC: 2.66 UIU/ML (ref 0.38–5.33)
UIBC SERPL-MCNC: 202 UG/DL (ref 110–370)
VIT B12 SERPL-MCNC: 337 PG/ML (ref 211–911)

## 2025-03-14 LAB
SHBG SERPL-SCNC: 39 NMOL/L (ref 17–125)
TESTOST FREE MFR SERPL: 1.5 %
TESTOST FREE SERPL-MCNC: 4 PG/ML
TESTOST SERPL-MCNC: 24 NG/DL
ZINC SERPL-MCNC: 67.3 UG/DL (ref 60–120)

## 2025-03-15 LAB
VIT B1 BLD-MCNC: 108 NMOL/L (ref 70–180)
VIT B6 SERPL-MCNC: 66.6 NMOL/L (ref 20–125)

## 2025-03-16 LAB — VIT B2 SERPL-SCNC: 16 NMOL/L (ref 5–50)

## 2025-03-17 ENCOUNTER — TELEPHONE (OUTPATIENT)
Dept: HEALTH INFORMATION MANAGEMENT | Facility: OTHER | Age: 60
End: 2025-03-17
Payer: COMMERCIAL

## 2025-03-17 NOTE — TELEPHONE ENCOUNTER
1. Caller Name: Laura Cano                        Call Back Number: [phone]      How would the patient prefer to be contacted with a response: [employer] message    Update to referral request for Endocrinology:    Called Pt to clarify diagnosis or reason fr referral.  PT stated that her lab test shows she is not absorbing iron, anemia test shows not anemic, seeking a referral to an endocrinologist, also wants to be seen for  hormone imbalances, did not specify upon request for clarification  PT stated that she would like to rule out underlying issues based on the iron lab test results. PT has [facility] with PCP on 4/1/25.

## 2025-03-17 NOTE — TELEPHONE ENCOUNTER
Patient needs to wait until our appointment, referral to Endocrinology is not appropriate for anemia.  Thanks

## 2025-04-01 ENCOUNTER — APPOINTMENT (OUTPATIENT)
Dept: MEDICAL GROUP | Facility: LAB | Age: 60
End: 2025-04-01
Payer: COMMERCIAL

## 2025-04-01 VITALS
HEART RATE: 78 BPM | SYSTOLIC BLOOD PRESSURE: 155 MMHG | RESPIRATION RATE: 16 BRPM | OXYGEN SATURATION: 96 % | DIASTOLIC BLOOD PRESSURE: 90 MMHG | HEIGHT: 69 IN | BODY MASS INDEX: 30.07 KG/M2 | WEIGHT: 203 LBS | TEMPERATURE: 97.4 F

## 2025-04-01 DIAGNOSIS — R03.0 ELEVATED BLOOD PRESSURE READING: ICD-10-CM

## 2025-04-01 DIAGNOSIS — Z86.2 HISTORY OF ANEMIA: ICD-10-CM

## 2025-04-01 DIAGNOSIS — Z85.3 HISTORY OF BREAST CANCER: Chronic | ICD-10-CM

## 2025-04-01 DIAGNOSIS — E78.00 ELEVATED LDL CHOLESTEROL LEVEL: ICD-10-CM

## 2025-04-01 DIAGNOSIS — N95.1 MENOPAUSAL SYMPTOMS: ICD-10-CM

## 2025-04-01 DIAGNOSIS — Z98.84 S/P LAPAROSCOPIC SLEEVE GASTRECTOMY: ICD-10-CM

## 2025-04-01 PROBLEM — J34.89 SINUS PRESSURE: Status: RESOLVED | Noted: 2024-04-16 | Resolved: 2025-04-01

## 2025-04-01 PROCEDURE — 3080F DIAST BP >= 90 MM HG: CPT | Performed by: NURSE PRACTITIONER

## 2025-04-01 PROCEDURE — 99215 OFFICE O/P EST HI 40 MIN: CPT | Performed by: NURSE PRACTITIONER

## 2025-04-01 PROCEDURE — 3077F SYST BP >= 140 MM HG: CPT | Performed by: NURSE PRACTITIONER

## 2025-04-01 ASSESSMENT — FIBROSIS 4 INDEX: FIB4 SCORE: 1.5

## 2025-04-01 ASSESSMENT — PATIENT HEALTH QUESTIONNAIRE - PHQ9: CLINICAL INTERPRETATION OF PHQ2 SCORE: 0

## 2025-04-01 NOTE — LETTER
BLOOD PRESSURE LOG FOR: Laura Alberts Rachael    DATE/TIME  AM WEIGHT BLOOD  PRESSURE PULSE TIME  PM BLOOD  PRESSURE   PULSE                                                                                                                                                                                                                                        Current Blood Pressure Medications: (dose and frequency)    MEDICATION DOSE FREQUENCY                         Please keily any medication change (increase/decrease/new med) with a *.

## 2025-04-02 NOTE — PROGRESS NOTES
Verbal consent was acquired by the patient to use [employer] ambient listening note generation during this visit Yes      Subjective   Laura Cano is a 59 y.o. female who presents for lab f/u  History of Present Illness  The patient is a 59-year-old female who presents for a follow-up on lab results. Pmhx of breast CA - estrogen receptor +.  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 Also hx of gastric sleeve.  She voluntarily discontinued anastrozole after a year due to side effects, including loss of strength and .     She has been experiencing fatigue, nighttime cold sweats, hot flashes. She has been taking AG1 Athletic Greens, which contains vitamin D3 and K1, but did not take it prior to her lab work. She spends a significant amount of time in the sun without sunscreen and is concerned about her vitamin D absorption. She has a history of heavy menstrual cycles, which were debilitating at times -now postmenopausal for several years.  Has found Pilates to be beneficial.   She is concerned about her LDL of 120.  She has not had a cardiac CT scan.   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   She has a history of elevated blood pressure, which was noted during her periodontist visit. She owns a blood pressure cuff and does not monitor her home blood pressure.  History of hypertension prior to gastric sleeve.    She has been referred to a periodontist due to an old bridge and two infected teeth that have erupted into her sinuses. She is scheduled for a tooth extraction and bone graft on 04/10/2025.    Currently working with Dr. Miller, ENT, regarding recurrent sinusitis.  Also working with dentist and oral surgeon regarding an infected tooth that has invaded her sinus cavity, having that pulled on April 10.    SOCIAL HISTORY  She drinks wine  "probably three times a week or on the weekends.    FAMILY HISTORY  Her father  from a massive cardiac event. Her grandfather lived until 94 and was completely healthy.      Objective   BP (!) 155/90   Pulse 78   Temp 36.3 °C (97.4 °F)   Resp 16   Ht 1.753 m (5' 9\")   Wt 92.1 kg (203 lb)   SpO2 96%   Blood pressure recheck by myself was exactly the same -155/90  Physical Exam  Gen. appears healthy in no distress   Skin appropriate for sex and age   Neck trachea is midline  Lungs unlabored breathing.  Clear to auscultation bilaterally  Heart regular rate and rhythm  Neuro gait and station normal   Psych appropriate, calm, interactive, well-groomed    Results  Reviewed all labs with her in depth drawn 2025       Assessment & Plan  1. Elevated blood pressure.  Her blood pressure readings have been consistently elevated per patient report at the dentist, with today's reading at 155/90. A blood pressure log will be provided for her to monitor her blood pressure at home. She is advised to check her blood pressure most mornings and evenings and record the readings. If her blood pressure remains elevated, antihypertensive medication may be considered.  Encouraged her to send me her blood pressure log within the next 2 weeks.  Encouraged a low-salt diet and continuation of her exercise.    2.  Menopausal symptoms:  Unfortunately not a candidate for HRT due to her history of estrogen receptor positive breast cancer and she is aware of this.  We discussed SSRI therapy for treatment of hot flashes/night sweats but she states that she would like to stay away from any pharmaceuticals.  Encouraged calcium, vitamin D and weightbearing exercise for bone health.    3.  Recurrent sinusitis:  Followed by Dr. Miller, ENT.    4.  History of anemia.  Not currently an issue.  Her menstruating.  Not taking iron.  Iron levels are good.    5.  History of breast cancer:  Up-to-date with imaging.  Diligent about self breast " exams.  Per her choice, not on anastrozole, did not tolerate.    7. Dental issues.  She has two infected teeth that have erupted into her sinuses and is scheduled for extraction and bone grafting on 04/10/2025. She is advised to follow up with her periodontist as scheduled.             Total face to face time 40 minutes of which over 50% of this visit is spent in counseling, education and outlining a plan of treatment and coordination of care for the above conditions. This included but was not limited to discussion of medication options and potential risks related to the medications, referral and specialty care options. All patient questions were answered    Please note that this dictation was created using voice recognition software. I have made every reasonable attempt to correct obvious errors, but I expect that there are errors of grammar and possibly content that I did not discover before finalizing the note.   Medication teaching and assessment/Teaching and training/Wound care and assessment

## 2025-04-18 ENCOUNTER — HOSPITAL ENCOUNTER (OUTPATIENT)
Dept: RADIOLOGY | Facility: MEDICAL CENTER | Age: 60
End: 2025-04-18
Attending: NURSE PRACTITIONER
Payer: COMMERCIAL

## 2025-04-18 DIAGNOSIS — E78.00 ELEVATED LDL CHOLESTEROL LEVEL: ICD-10-CM

## 2025-04-18 PROCEDURE — 4410556 CT-CARDIAC SCORING (SELF PAY ONLY)

## 2025-04-22 ENCOUNTER — RESULTS FOLLOW-UP (OUTPATIENT)
Dept: MEDICAL GROUP | Facility: LAB | Age: 60
End: 2025-04-22
Payer: COMMERCIAL

## 2025-04-22 DIAGNOSIS — I25.10 CORONARY ARTERY DISEASE DUE TO LIPID RICH PLAQUE: ICD-10-CM

## 2025-04-22 DIAGNOSIS — I25.83 CORONARY ARTERY DISEASE DUE TO LIPID RICH PLAQUE: ICD-10-CM

## 2025-05-06 ENCOUNTER — PATIENT MESSAGE (OUTPATIENT)
Dept: MEDICAL GROUP | Facility: LAB | Age: 60
End: 2025-05-06
Payer: COMMERCIAL

## 2025-05-06 DIAGNOSIS — Z12.31 SCREENING MAMMOGRAM FOR BREAST CANCER: ICD-10-CM

## 2025-05-08 ENCOUNTER — APPOINTMENT (OUTPATIENT)
Dept: RADIOLOGY | Facility: MEDICAL CENTER | Age: 60
End: 2025-05-08
Attending: NURSE PRACTITIONER
Payer: COMMERCIAL

## (undated) DEVICE — TOWEL STOP TIMEOUT SAFETY FLAG (40EA/CA)

## (undated) DEVICE — RELOAD WITH GRIPPING SURGACE TECHNOLOGY GREEN 60MM (12EA/BX)

## (undated) DEVICE — SENSOR OXIMETER ADULT SPO2 RD SET (20EA/BX)

## (undated) DEVICE — SUTURE 0 LIGATING REEL VICRYL PLUS (12PK/BX)

## (undated) DEVICE — TROCAR SEPARATOR 15MMZTHREAD - (6/BX)

## (undated) DEVICE — SODIUM CHL. IRRIGATION 0.9% 3000ML (4EA/CA 65CA/PF)

## (undated) DEVICE — GOWN SURGEONS X-LARGE - DISP. (30/CA)

## (undated) DEVICE — HEMOBLAST BELLOWS

## (undated) DEVICE — GOWN SURGEONS LARGE - (32/CA)

## (undated) DEVICE — GLOVE BIOGEL SZ 7 SURGICAL PF LTX - (50PR/BX 4BX/CA)

## (undated) DEVICE — CLOSURE SKIN STRIP 1/2 X 4 IN - (STERI STRIP) (50/BX 4BX/CA)

## (undated) DEVICE — SET LEADWIRE 5 LEAD BEDSIDE DISPOSABLE ECG (1SET OF 5/EA)

## (undated) DEVICE — SYSTEM CALIBARATION  GASTRECTOMY 40FR WITH BULB (5/BX)

## (undated) DEVICE — DERMABOND ADVANCED - (12EA/BX)

## (undated) DEVICE — SHAVER 5.5 RESECTOR FORMULA (5EA/BX )

## (undated) DEVICE — BOVIE BLADE COATED &INSULATED - 25/PK

## (undated) DEVICE — SYRINGE 30 ML LL (56/BX)

## (undated) DEVICE — CANNULA W/SEAL 5X100 Z-THRE - ADED KII (12/BX)

## (undated) DEVICE — KIT ANESTHESIA W/CIRCUIT & 3/LT BAG W/FILTER (20EA/CA)

## (undated) DEVICE — TROCAR 5X100 NON BLADED Z-TH - READ KII (6/BX)

## (undated) DEVICE — SHEET TRANSVERSE LAP - (12EA/CA)

## (undated) DEVICE — Device

## (undated) DEVICE — LACTATED RINGERS INJ 1000 ML - (14EA/CA 60CA/PF)

## (undated) DEVICE — DRAPE IOBAN II INCISE 23X17 - (10EA/BX 4BX/CA)

## (undated) DEVICE — SENSOR SPO2 NEO LNCS ADHESIVE (20/BX) SEE USER NOTES

## (undated) DEVICE — CHLORAPREP 26 ML APPLICATOR - ORANGE TINT(25/CA)

## (undated) DEVICE — CLIP APPLIER 10MM ENDO LARGE (3EA/BX)

## (undated) DEVICE — SUTURE 4-0 MONOCRYL PLUS PS-2 - 27 INCH (36/BX)

## (undated) DEVICE — KIT  I.V. START (100EA/CA)

## (undated) DEVICE — SUTURE 3-0 MONOCRYL PLUS PS-1 - 27 INCH (36/BX)

## (undated) DEVICE — CANISTER SUCTION 3000ML MECHANICAL FILTER AUTO SHUTOFF MEDI-VAC NONSTERILE LF DISP  (40EA/CA)

## (undated) DEVICE — GLOVE BIOGEL PI INDICATOR SZ 6.5 SURGICAL PF LF - (50/BX 4BX/CA)

## (undated) DEVICE — RELOAD WITH GRIPPING SURFACE TECHNOLOGY BLUE 60MM (12EA/BX)

## (undated) DEVICE — SUTURE2-0 27IN VCRL ANTI VIOL (36PK/BX)

## (undated) DEVICE — SLEEVE VASO CALF MED - (10PR/CA)

## (undated) DEVICE — PROTECTOR ULNA NERVE - (36PR/CA)

## (undated) DEVICE — MANIFOLD NEPTUNE 1 PORT (20/PK)

## (undated) DEVICE — COVER CIV-FLEX TRANSDUCER - (24/BX)

## (undated) DEVICE — TUBE CONNECTING SUCTION - CLEAR PLASTIC STERILE 72 IN (50EA/CA)

## (undated) DEVICE — SUTURE 4-0 VICRYL PLUSFS-1 - 27 INCH (36/BX)

## (undated) DEVICE — GLOVE SZ 7 BIOGEL PI MICRO - PF LF (50PR/BX 4BX/CA)

## (undated) DEVICE — TUBING CLEARLINK DUO-VENT - C-FLO (48EA/CA)

## (undated) DEVICE — HEAD HOLDER JUNIOR/ADULT

## (undated) DEVICE — SUCTION INSTRUMENT YANKAUER BULBOUS TIP W/O VENT (50EA/CA)

## (undated) DEVICE — APPLICATOR DUPLO SPRAYER (5EA/CA)

## (undated) DEVICE — PEROXIDE HYDROGEN 3% 32 OZ. (12EA/BX)

## (undated) DEVICE — ABLATOR WAND SERFAS 90-S CRUISE

## (undated) DEVICE — GOWN WARMING STANDARD FLEX - (30/CA)

## (undated) DEVICE — SPIDER SHOULDER HOLDER (12EA/BX)

## (undated) DEVICE — SYRINGE ASEPTO - (50EA/CA

## (undated) DEVICE — SET TUBING PNEUMOCLEAR HIGH FLOW SMOKE EVACUATION (10EA/BX)

## (undated) DEVICE — COVER LIGHT HANDLE FLEXIBLE - SOFT (2EA/PK 80PK/CA)

## (undated) DEVICE — GOWN WARMING X-LARGE FLEX - (20/CA)

## (undated) DEVICE — GLOVE BIOGEL INDICATOR SZ 7SURGICAL PF LTX - (50/BX 4BX/CA)

## (undated) DEVICE — SET EXTENSION WITH 2 PORTS (48EA/CA) ***PART #2C8610 IS A SUBSTITUTE*****

## (undated) DEVICE — CANISTER SUCTION RIGID RED 1500CC (40EA/CA)

## (undated) DEVICE — SUTURE 3-0 PROLENE PS-1 (12PK/BX)

## (undated) DEVICE — GLOVE BIOGEL PI INDICATOR SZ 7.0 SURGICAL PF LF - (50/BX 4BX/CA)

## (undated) DEVICE — MAT PATIENT POSITIONING PREVALON (10EA/CA)

## (undated) DEVICE — STAPLER POWERED 60MM (3EA/BX)

## (undated) DEVICE — SUTURE 3-0 VICRYL PLUS SH - 8X 18 INCH (12/BX)

## (undated) DEVICE — STERI STRIP COMPOUND BENZOIN - TINCTURE 0.6ML WITH APPLICATOR (40EA/BX)

## (undated) DEVICE — ELECTRODE DUAL RETURN W/ CORD - (50/PK)

## (undated) DEVICE — PERISTRIP 60 STAPLE LINE REINFORCEMENT (6EA/CA)

## (undated) DEVICE — GLOVE BIOGEL SZ 8 SURGICAL PF LTX - (50PR/BX 4BX/CA)

## (undated) DEVICE — BANDAID SHEER STRIP 3/4 IN (100EA/BX 12BX/CA)

## (undated) DEVICE — PACK MINOR BASIN - (2EA/CA)

## (undated) DEVICE — SUTURE GENERAL

## (undated) DEVICE — PACK SHOULDER ARTHROSCOPY SM - (2EA/CA)

## (undated) DEVICE — DRAPESURG STERI-DRAPE LONG - (10/BX 4BX/CA)

## (undated) DEVICE — DRAPETIBURON SHOULDER W/POUCH - (5EA/CA)

## (undated) DEVICE — GLOVE SZ 6.5 BIOGEL PI MICRO - PF LF (50PR/BX)

## (undated) DEVICE — CATHETER IV 20 GA X 1-1/4 ---SURG.& SDS ONLY--- (50EA/BX)

## (undated) DEVICE — SODIUM CHL IRRIGATION 0.9% 1000ML (12EA/CA)

## (undated) DEVICE — SEALER TISSUE CURVED ENSEAL X1 (3EA/BX)

## (undated) DEVICE — DRAPE U SPLIT IMP 54 X 76 - (24/CA)

## (undated) DEVICE — MASK ANESTHESIA ADULT  - (100/CA)

## (undated) DEVICE — ELECTRODE 850 FOAM ADHESIVE - HYDROGEL RADIOTRNSPRNT (50/PK)

## (undated) DEVICE — BAG RETRIEVAL 12/15 MM INZII (5EA/CA) THIS WILL REPLACE ITEM 75018

## (undated) DEVICE — TISSEEL 4ML ----MUST ORDER A MIN OF 6EA----

## (undated) DEVICE — RELOAD WITH GRIPPING SURFACE TECHNOLOGY GOLD 60MM (12EA/BX)

## (undated) DEVICE — TUBING DAY USE W/CARTRIDGE (10EA/BX)

## (undated) DEVICE — GLOVE BIOGEL PI INDICATOR SZ 7.5 SURGICAL PF LF -(50/BX 4BX/CA)

## (undated) DEVICE — TUBING CASSETTE CROSSFLOW INTEGRATED (10EA/CA)

## (undated) DEVICE — PENCIL ELECTSURG 10FT HLSTR - WITH BLADE (50EA/CA)

## (undated) DEVICE — CANNULA FULLY THREADED 8 X 75 (5EA/BX)

## (undated) DEVICE — GLOVE BIOGEL INDICATOR SZ 6.5 SURGICAL PF LTX - (50PR/BX 4BX/CA)

## (undated) DEVICE — TUBE E-T HI-LO CUFF 7.5MM (10EA/PK)

## (undated) DEVICE — WATER IRRIGATION STERILE 1000ML (12EA/CA)

## (undated) DEVICE — GLOVE BIOGEL SZ 6.5 SURGICAL PF LTX (50PR/BX 4BX/CA)